# Patient Record
Sex: FEMALE | Race: WHITE | Employment: FULL TIME | URBAN - METROPOLITAN AREA
[De-identification: names, ages, dates, MRNs, and addresses within clinical notes are randomized per-mention and may not be internally consistent; named-entity substitution may affect disease eponyms.]

---

## 2017-01-10 ENCOUNTER — OFFICE VISIT (OUTPATIENT)
Dept: ORTHOPEDIC SURGERY | Age: 47
End: 2017-01-10

## 2017-01-10 VITALS
HEART RATE: 91 BPM | HEIGHT: 63 IN | DIASTOLIC BLOOD PRESSURE: 88 MMHG | OXYGEN SATURATION: 98 % | RESPIRATION RATE: 18 BRPM | SYSTOLIC BLOOD PRESSURE: 149 MMHG | BODY MASS INDEX: 35.01 KG/M2 | WEIGHT: 197.6 LBS | TEMPERATURE: 97.8 F

## 2017-01-10 DIAGNOSIS — M50.20 HNP (HERNIATED NUCLEUS PULPOSUS), CERVICAL: ICD-10-CM

## 2017-01-10 DIAGNOSIS — M48.02 CERVICAL SPINAL STENOSIS: Primary | ICD-10-CM

## 2017-01-10 NOTE — PROGRESS NOTES
[address], [address]  [place], [facility]  Phone: [phone]  Fax: [phone]  PROGRESS NOTE  Patient: Jimmy Drummond                MRN: [id number]       SSN: [id number]  YOB: 1970        AGE: 48 y.o. SEX: female  Body mass index is 35 kg/(m^2). PCP: Alexey Schulte MD  01/10/17    Chief Complaint   Patient presents with    Back Pain     3 month follow up       Michelle Half, RADIOGRAPHS, and PLAN:     HISTORY:  Ms. Digna Mensah returns today. She is complaining of a sense of progressive weakness in her right upper extremity. She is having continued neck pain. She rates her pain as a 4-5/10. She feels that she is dropping objects more often, and she is having trouble doing her activities at work and at home. With manual motor testing, I do not note much difference. PHYSICAL EXAM:  She has antalgic range of motion of her neck with shocking sensations into her right arm. RADIOGRAPHS:  MRIs demonstrate spondylotic changes with disc protrusion and foraminal stenosis due to disc and uncinate spurring, right greater than left, at L4-5 and L5-6. Generalized kyphotic attitude at these segments. ASSESSMENT/PLAN: I think would be better given the overall alignment of her spine   to consider a decompression and fusion as opposed to arthroplasty. I discussed the risks, benefits, complications, and alternatives to surgery. The patient wishes to proceed. She will abstain from smoking and proceed with surgery once the appropriate approvals and clearances take place. cc: Marie Vallejo M.D. Past Medical History   Diagnosis Date    Psychiatric disorder      depression       No family history on file. Current Outpatient Prescriptions   Medication Sig Dispense Refill    buPROPion SR (WELLBUTRIN SR) 150 mg SR tablet Take 150 mg by mouth two (2) times a day.       cyclobenzaprine (FLEXERIL) 10 mg tablet       HYDROcodone-acetaminophen (NORCO) 5-325 mg per tablet       melatonin tab tablet Take 10 mg by mouth nightly.  gabapentin (NEURONTIN) 600 mg tablet Take 1 Tab by mouth four (4) times daily. 120 Tab 1       No Known Allergies    Past Surgical History   Procedure Laterality Date    Hx gyn       hysterectomy    Hx gyn       c/s       Past Medical History   Diagnosis Date    Psychiatric disorder      depression       Social History     Social History    Marital status:      Spouse name: N/A    Number of children: N/A    Years of education: N/A     Occupational History    Not on file. Social History Main Topics    Smoking status: Current Every Day Smoker     Packs/day: 0.50    Smokeless tobacco: Not on file    Alcohol use Yes      Comment: socially    Drug use: Not on file    Sexual activity: Not on file     Other Topics Concern    Not on file     Social History Narrative     REVIEW OF SYSTEMS:   CONSTITUTIONAL SYMPTOMS:  Negative. EYES:  Negative. EARS, NOSE, THROAT AND MOUTH:  Negative. CARDIOVASCULAR:  Negative. RESPIRATORY:  Negative. GENITOURINARY: Negative. GASTROINTESTINAL:  Negative. INTEGUMENTARY (SKIN AND/OR BREAST):  Negative. MUSCULOSKELETAL: Per HPI.   ENDOCRINE/RHEUMATOLOGIC:  Negative. NEUROLOGICAL:  Per HPI. HEMATOLOGIC/LYMPHATIC:  Negative. ALLERGIC/IMMUNOLOGIC:  Negative. PSYCHIATRIC:  Negative. PHYSICAL EXAMINATION:   Visit Vitals    /88    Pulse 91    Temp 97.8 °F (36.6 °C) (Oral)    Resp 18    Ht 5' 3\" (1.6 m)    Wt 197 lb 9.6 oz (89.6 kg)    SpO2 98%    BMI 35 kg/m2        CONSTITUTIONAL: The patient is in no apparent distress and is alert and oriented x 3. HEENT: Normocephalic. Hearing grossly intact. NECK: Supple and symmetric. no tenderness, or masses were felt. RESPIRATORY: No labored breathing. CARDIOVASCULAR: The carotid pulses were normal. Peripheral pulses were 2+.   CHEST: Normal AP diameter and normal contour without any kyphoscoliosis. LYMPHATIC: No lymphadenopathy was appreciated in the neck, axillae or groin. SKIN: Negative for scars, rashes, lesions, or ulcers on the right upper, right lower, left upper, left lower and trunk. NEUROLOGICAL: Alert and oriented x 3. Ambulation without assistive device. FWB. EXTREMITIES: See musculoskeletal.  MUSCULOSKELETAL:   Head and Neck:  Negative for misalignment, asymmetry, crepitation, defects, tenderness masses or effusions.  Left Upper Extremity: Inspection, percussion and palpation preformed. Boyles sign is negative.  Right Upper Extremity: Paresthesia. Increasing weakness. Loss of dexterity. Inspection, percussion and palpation preformed. Boyles sign is negative.  Spine, Ribs and Pelvis: Inspection, percussion and palpation preformed. Negative for misalignment, asymmetry, crepitation, defects, tenderness masses or effusions.  Right Lower Extremity: Inspection, percussion and palpation preformed. Negative straight leg raise.  Left Lower Extremity: Inspection, percussion and palpation preformed. Negative straight leg raise. SPINE EXAM:     Cervical spine: Neck is midline. Normal muscle tone. No focal atrophy is noted. ASSESSMENT    ICD-10-CM ICD-9-CM    1. Cervical spinal stenosis M48.02 723.0    2.  HNP (herniated nucleus pulposus), cervical M50.20 722.0        Written by Jeanie You, as dictated by Monisha Francis MD.

## 2017-01-10 NOTE — PATIENT INSTRUCTIONS

## 2017-01-10 NOTE — PROGRESS NOTES
[address] [facility]   Pre-Surgical Worksheet    Patient: Mariaelena Isaac                         MRN: [id number]     Age:  48 y.o.,      Sex: female    YOB: 1970           LA: January 10, 2017  PCP: Richard Rand MD    No Known Allergies      ICD-10-CM ICD-9-CM    1. Cervical spinal stenosis M48.02 723.0    2. HNP (herniated nucleus pulposus), cervical M50.20 722.0        Surgery: ACDF C4/5 C5/6    Pain Assessment   Pain Assessment  1/10/2017   Location of Pain Back   Location Modifiers -   Severity of Pain 3   Quality of Pain Sharp;Dull;Aching   Duration of Pain Persistent   Frequency of Pain Constant   Aggravating Factors Bending;Standing;Walking;Stairs   Aggravating Factors Comment -   Limiting Behavior Yes   Relieving Factors Rest   Result of Injury No       Visit Vitals    /88    Pulse 91    Temp 97.8 °F (36.6 °C) (Oral)    Resp 18    Ht 5' 3\" (1.6 m)    Wt 197 lb 9.6 oz (89.6 kg)    SpO2 98%    BMI 35 kg/m2       ADL Limits: Bathing and Dressing    Spine Surgery?: No:  When . Where. Spinal Injections?: No:   When . Where. Physical Therapy?: Yes  When 2016. Where [facility]. NSAID's?: Yes    Pain Medications?: No:   Type: In Pain Management: YES, Where:   Pain level at worse is a 10/10      Current Outpatient Prescriptions   Medication Sig    buPROPion SR (WELLBUTRIN SR) 150 mg SR tablet Take 150 mg by mouth two (2) times a day.  cyclobenzaprine (FLEXERIL) 10 mg tablet     HYDROcodone-acetaminophen (NORCO) 5-325 mg per tablet     melatonin tab tablet Take 10 mg by mouth nightly.  gabapentin (NEURONTIN) 600 mg tablet Take 1 Tab by mouth four (4) times daily. No current facility-administered medications for this visit.         Past Medical History   Diagnosis Date    Psychiatric disorder      depression       Past Surgical History   Procedure Laterality Date    Hx gyn       hysterectomy    Hx gyn       c/s       Social History Social History    Marital status:      Spouse name: N/A    Number of children: N/A    Years of education: N/A     Social History Main Topics    Smoking status: Current Every Day Smoker     Packs/day: 0.50    Smokeless tobacco: None    Alcohol use Yes      Comment: socially    Drug use: None    Sexual activity: Not Asked     Other Topics Concern    None     Social History Narrative

## 2017-01-26 ENCOUNTER — DOCUMENTATION ONLY (OUTPATIENT)
Dept: ORTHOPEDIC SURGERY | Age: 47
End: 2017-01-26

## 2017-01-26 NOTE — PROGRESS NOTES
Disability form was received from [facility] , instructed patient that we will call when completed, may take 7-10 business days.

## 2017-01-31 ENCOUNTER — DOCUMENTATION ONLY (OUTPATIENT)
Dept: ORTHOPEDIC SURGERY | Age: 47
End: 2017-01-31

## 2017-02-17 ENCOUNTER — HOSPITAL ENCOUNTER (OUTPATIENT)
Dept: PREADMISSION TESTING | Age: 47
Discharge: HOME | End: 2017-02-17
Payer: COMMERCIAL

## 2017-02-17 DIAGNOSIS — Z01.818 PRE-OP EVALUATION: ICD-10-CM

## 2017-02-17 LAB
ALBUMIN SERPL BCP-MCNC: 4 G/DL (ref 3.4–5)
ALBUMIN/GLOB SERPL: 1.3 {RATIO} (ref 0.8–1.7)
ALP SERPL-CCNC: 89 U/L (ref 45–117)
ALT SERPL-CCNC: 21 U/L (ref 13–56)
ANION GAP BLD CALC-SCNC: 6 MMOL/L (ref 3–18)
AST SERPL W P-5'-P-CCNC: 13 U/L (ref 15–37)
BILIRUB SERPL-MCNC: 0.3 MG/DL (ref 0.2–1)
BUN SERPL-MCNC: 10 MG/DL (ref 7–18)
BUN/CREAT SERPL: 12 (ref 12–20)
CALCIUM SERPL-MCNC: 9.3 MG/DL (ref 8.5–10.1)
CHLORIDE SERPL-SCNC: 99 MMOL/L (ref 100–108)
CO2 SERPL-SCNC: 31 MMOL/L (ref 21–32)
CREAT SERPL-MCNC: 0.84 MG/DL (ref 0.6–1.3)
ERYTHROCYTE [DISTWIDTH] IN BLOOD BY AUTOMATED COUNT: 12.8 % (ref 11.6–14.5)
GLOBULIN SER CALC-MCNC: 3.2 G/DL (ref 2–4)
GLUCOSE SERPL-MCNC: 89 MG/DL (ref 74–99)
HCT VFR BLD AUTO: 44.4 % (ref 35–45)
HGB BLD-MCNC: 14.7 G/DL (ref 12–16)
MCH RBC QN AUTO: 30.1 PG (ref 24–34)
MCHC RBC AUTO-ENTMCNC: 33.1 G/DL (ref 31–37)
MCV RBC AUTO: 91 FL (ref 74–97)
PLATELET # BLD AUTO: 305 K/UL (ref 135–420)
PMV BLD AUTO: 10.8 FL (ref 9.2–11.8)
POTASSIUM SERPL-SCNC: 4.3 MMOL/L (ref 3.5–5.5)
PROT SERPL-MCNC: 7.2 G/DL (ref 6.4–8.2)
RBC # BLD AUTO: 4.88 M/UL (ref 4.2–5.3)
SODIUM SERPL-SCNC: 136 MMOL/L (ref 136–145)
WBC # BLD AUTO: 9.6 K/UL (ref 4.6–13.2)

## 2017-02-17 PROCEDURE — 36415 COLL VENOUS BLD VENIPUNCTURE: CPT | Performed by: ORTHOPAEDIC SURGERY

## 2017-02-17 PROCEDURE — 93005 ELECTROCARDIOGRAM TRACING: CPT

## 2017-02-17 PROCEDURE — 85027 COMPLETE CBC AUTOMATED: CPT | Performed by: ORTHOPAEDIC SURGERY

## 2017-02-17 PROCEDURE — 80053 COMPREHEN METABOLIC PANEL: CPT | Performed by: ORTHOPAEDIC SURGERY

## 2017-02-17 RX ORDER — FEXOFENADINE HCL AND PSEUDOEPHEDRINE HCI 60; 120 MG/1; MG/1
1 TABLET, EXTENDED RELEASE ORAL EVERY 12 HOURS
COMMUNITY

## 2017-02-18 LAB
ATRIAL RATE: 66 BPM
CALCULATED P AXIS, ECG09: 34 DEGREES
CALCULATED R AXIS, ECG10: 25 DEGREES
CALCULATED T AXIS, ECG11: 10 DEGREES
DIAGNOSIS, 93000: NORMAL
P-R INTERVAL, ECG05: 160 MS
Q-T INTERVAL, ECG07: 432 MS
QRS DURATION, ECG06: 80 MS
QTC CALCULATION (BEZET), ECG08: 452 MS
VENTRICULAR RATE, ECG03: 66 BPM

## 2017-02-20 NOTE — H&P
Pre-Admission History and Physical    Patient: Katalina Conti   MRN: [id number]   SSN: [id number]   YOB: 1970   Age: 48 y.o. Sex: female     Patient scheduled for: ACDF C4/5, 5/6.6/7  Date of surgery: 2/27/17. [facility] Surgeon: Jim Bullock MD    HPI:  Katalina Conti is a 48 y.o. female with a sense of progressive weakness in her right upper extremity. She is having continued neck pain. She rates her pain as a 4-5/10. She feels that she is dropping objects more often, and she is having trouble doing her activities at work and at home. MRI demonstrates spondylotic changes with disc protrusion and foraminal stenosis due to disc and uncinate spurring, right greater than left, at C4-5 andC5-6, C6/7. Generalized kyphotic attitude at these segments. This patient has failed the presurgical conservative treatments  including physical therapy and medications. . Pain has impacted the patient's functional ability to bathe and dress without pain. She  is being admitted for surgical intervention. Past Medical History   Diagnosis Date    Psychiatric disorder      depression     Social History     Social History    Marital status:      Spouse name: N/A    Number of children: N/A    Years of education: N/A     Social History Main Topics    Smoking status: Current Every Day Smoker     Packs/day: 0.50    Smokeless tobacco: None    Alcohol use Yes      Comment: socially    Drug use: None    Sexual activity: Not Asked     Other Topics Concern    None     Social History Narrative     Past Surgical History   Procedure Laterality Date    Hx gyn       hysterectomy    Hx gyn       c/s     History reviewed. No pertinent family history. No Known Allergies  Current Outpatient Prescriptions   Medication Sig Dispense Refill    fexofenadine-pseudoephedrine (ALLEGRA-D 12 HOUR)  mg Tb12 Take 1 Tab by mouth every twelve (12) hours.       cyclobenzaprine (FLEXERIL) 10 mg tablet       HYDROcodone-acetaminophen (NORCO) 5-325 mg per tablet       melatonin tab tablet Take 10 mg by mouth nightly.  gabapentin (NEURONTIN) 600 mg tablet Take 1 Tab by mouth four (4) times daily. 120 Tab 1    buPROPion SR (WELLBUTRIN SR) 150 mg SR tablet Take 150 mg by mouth two (2) times a day. ROS:  Denies chills, fever,night sweats,  bowel or bladder dysfunction, unexplained weight loss/weight gain, chest pain, sob or anxiety. Physical Examination    Gen: Well developed, well nourished 48 y.o. female    /88    Pulse 91    Temp 97.8 °F (36.6 °C) (Oral)    Resp 18    Ht 5' 3\" (1.6 m)    Wt 197 lb 9.6 oz (89.6 kg)    SpO2 98%    BMI 35 kg/m2         CONSTITUTIONAL: The patient is in no apparent distress and is alert and oriented x 3. HEENT: Normocephalic. Hearing grossly intact. NECK: Supple and symmetric. no tenderness, or masses were felt. RESPIRATORY: No labored breathing. CARDIOVASCULAR: The carotid pulses were normal. Peripheral pulses were 2+. CHEST: Normal AP diameter and normal contour without any kyphoscoliosis. LYMPHATIC: No lymphadenopathy was appreciated in the neck, axillae or groin. SKIN: Negative for scars, rashes, lesions, or ulcers on the right upper, right lower, left upper, left lower and trunk. NEUROLOGICAL: Alert and oriented x 3. Ambulation without assistive device. FWB. EXTREMITIES: See musculoskeletal.  MUSCULOSKELETAL:  · Head and Neck: Negative for misalignment, asymmetry, crepitation, defects, tenderness masses or effusions. · Left Upper Extremity: Inspection, percussion and palpation preformed. Boyles sign is negative. · Right Upper Extremity: Paresthesia. Increasing weakness. Loss of dexterity. Inspection, percussion and palpation preformed. Boyles sign is negative. · Spine, Ribs and Pelvis: Inspection, percussion and palpation preformed.  Negative for misalignment, asymmetry, crepitation, defects, tenderness masses or effusions. · Right Lower Extremity: Inspection, percussion and palpation preformed. Negative straight leg raise. · Left Lower Extremity: Inspection, percussion and palpation preformed. Negative straight leg raise.        SPINE EXAM:      Cervical spine: Neck is midline. Normal muscle tone. No focal atrophy is noted.           Assessment and Plan    Due to the pt's persistent symptoms unrelieved by conservative measure [facility]elida Ash [facility]. The post-operative plan of care consists of physical therapy, home health and a 2 week f/u office visit. We are pending medical clearance by Dr Evelia Bella. The risks, benefits, complications and alternatives to surgery have been discussed in detail with the patient. The patient understands and agrees to proceed.      NIKA Jovel dictating for Carie Huang MD

## 2017-02-24 ENCOUNTER — ANESTHESIA EVENT (OUTPATIENT)
Dept: SURGERY | Age: 47
End: 2017-02-24
Payer: COMMERCIAL

## 2017-02-27 ENCOUNTER — APPOINTMENT (OUTPATIENT)
Dept: GENERAL RADIOLOGY | Age: 47
End: 2017-02-27
Attending: ORTHOPAEDIC SURGERY
Payer: COMMERCIAL

## 2017-02-27 ENCOUNTER — HOSPITAL ENCOUNTER (OUTPATIENT)
Age: 47
Setting detail: OBSERVATION
Discharge: HOME HEALTH | End: 2017-02-28
Attending: ORTHOPAEDIC SURGERY | Admitting: ORTHOPAEDIC SURGERY
Payer: COMMERCIAL

## 2017-02-27 ENCOUNTER — SURGERY (OUTPATIENT)
Age: 47
End: 2017-02-27

## 2017-02-27 ENCOUNTER — ANESTHESIA (OUTPATIENT)
Dept: SURGERY | Age: 47
End: 2017-02-27
Payer: COMMERCIAL

## 2017-02-27 PROCEDURE — 77030031139 HC SUT VCRL2 J&J -A: Performed by: ORTHOPAEDIC SURGERY

## 2017-02-27 PROCEDURE — 74011000250 HC RX REV CODE- 250

## 2017-02-27 PROCEDURE — 77030010514 HC APPL CLP LIG COVD -B: Performed by: ORTHOPAEDIC SURGERY

## 2017-02-27 PROCEDURE — 77030029099 HC BN WAX SSPC -A: Performed by: ORTHOPAEDIC SURGERY

## 2017-02-27 PROCEDURE — 77030003909 HC BIT DRL W/STP SYNT -D: Performed by: ORTHOPAEDIC SURGERY

## 2017-02-27 PROCEDURE — 76060000036 HC ANESTHESIA 2.5 TO 3 HR: Performed by: ORTHOPAEDIC SURGERY

## 2017-02-27 PROCEDURE — 77030012406 HC DRN WND PENRS BARD -A: Performed by: ORTHOPAEDIC SURGERY

## 2017-02-27 PROCEDURE — C1713 ANCHOR/SCREW BN/BN,TIS/BN: HCPCS | Performed by: ORTHOPAEDIC SURGERY

## 2017-02-27 PROCEDURE — 77030010507 HC ADH SKN DERMBND J&J -B: Performed by: ORTHOPAEDIC SURGERY

## 2017-02-27 PROCEDURE — 74011250636 HC RX REV CODE- 250/636: Performed by: NURSE ANESTHETIST, CERTIFIED REGISTERED

## 2017-02-27 PROCEDURE — 99218 HC RM OBSERVATION: CPT

## 2017-02-27 PROCEDURE — 77030013079 HC BLNKT BAIR HGGR 3M -A: Performed by: ANESTHESIOLOGY

## 2017-02-27 PROCEDURE — 74011250636 HC RX REV CODE- 250/636

## 2017-02-27 PROCEDURE — 77030002933 HC SUT MCRYL J&J -A: Performed by: ORTHOPAEDIC SURGERY

## 2017-02-27 PROCEDURE — 77030008683 HC TU ET CUF COVD -A: Performed by: ANESTHESIOLOGY

## 2017-02-27 PROCEDURE — 76210000016 HC OR PH I REC 1 TO 1.5 HR: Performed by: ORTHOPAEDIC SURGERY

## 2017-02-27 PROCEDURE — 74011250637 HC RX REV CODE- 250/637: Performed by: NURSE ANESTHETIST, CERTIFIED REGISTERED

## 2017-02-27 PROCEDURE — 77030032490 HC SLV COMPR SCD KNE COVD -B: Performed by: ORTHOPAEDIC SURGERY

## 2017-02-27 PROCEDURE — 77030020782 HC GWN BAIR PAWS FLX 3M -B: Performed by: ORTHOPAEDIC SURGERY

## 2017-02-27 PROCEDURE — 74011250636 HC RX REV CODE- 250/636: Performed by: NURSE PRACTITIONER

## 2017-02-27 PROCEDURE — 77030011267 HC ELECTRD BLD COVD -A: Performed by: ORTHOPAEDIC SURGERY

## 2017-02-27 PROCEDURE — 77030026438 HC STYL ET INTUB CARD -A: Performed by: ANESTHESIOLOGY

## 2017-02-27 PROCEDURE — 77030013567 HC DRN WND RESERV BARD -A: Performed by: ORTHOPAEDIC SURGERY

## 2017-02-27 PROCEDURE — 77030034865: Performed by: ORTHOPAEDIC SURGERY

## 2017-02-27 PROCEDURE — 74011250636 HC RX REV CODE- 250/636: Performed by: ANESTHESIOLOGY

## 2017-02-27 PROCEDURE — 77030004402 HC BUR NEUR STRY -C: Performed by: ORTHOPAEDIC SURGERY

## 2017-02-27 PROCEDURE — 77030018836 HC SOL IRR NACL ICUM -A: Performed by: ORTHOPAEDIC SURGERY

## 2017-02-27 PROCEDURE — 77030011256 HC DRSG MEPILEX <16IN NO BORD MOLN -A: Performed by: ORTHOPAEDIC SURGERY

## 2017-02-27 PROCEDURE — 74011250637 HC RX REV CODE- 250/637: Performed by: ORTHOPAEDIC SURGERY

## 2017-02-27 PROCEDURE — 76010000132 HC OR TIME 2.5 TO 3 HR: Performed by: ORTHOPAEDIC SURGERY

## 2017-02-27 PROCEDURE — 74011000272 HC RX REV CODE- 272: Performed by: ORTHOPAEDIC SURGERY

## 2017-02-27 PROCEDURE — 74011000250 HC RX REV CODE- 250: Performed by: ORTHOPAEDIC SURGERY

## 2017-02-27 PROCEDURE — 77030034863 HC SCR SPN ARCHON NUVA -C: Performed by: ORTHOPAEDIC SURGERY

## 2017-02-27 PROCEDURE — 74011250636 HC RX REV CODE- 250/636: Performed by: ORTHOPAEDIC SURGERY

## 2017-02-27 PROCEDURE — L0120 CERV FLEX N/ADJ FOAM PRE OTS: HCPCS | Performed by: ORTHOPAEDIC SURGERY

## 2017-02-27 PROCEDURE — 77030037102 HC SPCR CERV ALLGRFT TRIAD NUVA -G1: Performed by: ORTHOPAEDIC SURGERY

## 2017-02-27 PROCEDURE — 77030011640 HC PAD GRND REM COVD -A: Performed by: ORTHOPAEDIC SURGERY

## 2017-02-27 PROCEDURE — 97161 PT EVAL LOW COMPLEX 20 MIN: CPT

## 2017-02-27 PROCEDURE — 77030019908 HC STETH ESOPH SIMS -A: Performed by: ANESTHESIOLOGY

## 2017-02-27 PROCEDURE — 77030012894: Performed by: ORTHOPAEDIC SURGERY

## 2017-02-27 DEVICE — IMPLANTABLE DEVICE: Type: IMPLANTABLE DEVICE | Site: SPINE CERVICAL | Status: FUNCTIONAL

## 2017-02-27 DEVICE — SCREW SPNL L15MM DIA4MM ANTR CERV SELF DRL VAR ANG ARCHON: Type: IMPLANTABLE DEVICE | Site: SPINE CERVICAL | Status: FUNCTIONAL

## 2017-02-27 DEVICE — CAGE SPNL W11XH8XL14MM LUM LORD INTBDY FUS TRIAD CC: Type: IMPLANTABLE DEVICE | Site: SPINE CERVICAL | Status: FUNCTIONAL

## 2017-02-27 DEVICE — SSC BONE WAX
Type: IMPLANTABLE DEVICE | Site: SPINE CERVICAL | Status: FUNCTIONAL
Brand: SSC BONE WAX

## 2017-02-27 RX ORDER — SODIUM CHLORIDE 0.9 % (FLUSH) 0.9 %
5-10 SYRINGE (ML) INJECTION EVERY 8 HOURS
Status: DISCONTINUED | OUTPATIENT
Start: 2017-02-27 | End: 2017-02-28 | Stop reason: HOSPADM

## 2017-02-27 RX ORDER — SODIUM CHLORIDE 0.9 % (FLUSH) 0.9 %
5-10 SYRINGE (ML) INJECTION AS NEEDED
Status: DISCONTINUED | OUTPATIENT
Start: 2017-02-27 | End: 2017-02-27 | Stop reason: HOSPADM

## 2017-02-27 RX ORDER — DIPHENHYDRAMINE HYDROCHLORIDE 50 MG/ML
12.5 INJECTION, SOLUTION INTRAMUSCULAR; INTRAVENOUS
Status: DISCONTINUED | OUTPATIENT
Start: 2017-02-27 | End: 2017-02-28 | Stop reason: HOSPADM

## 2017-02-27 RX ORDER — ALBUTEROL SULFATE 0.83 MG/ML
2.5 SOLUTION RESPIRATORY (INHALATION) AS NEEDED
Status: DISCONTINUED | OUTPATIENT
Start: 2017-02-27 | End: 2017-02-27 | Stop reason: HOSPADM

## 2017-02-27 RX ORDER — HYDROMORPHONE HYDROCHLORIDE 1 MG/ML
1 INJECTION, SOLUTION INTRAMUSCULAR; INTRAVENOUS; SUBCUTANEOUS
Status: DISCONTINUED | OUTPATIENT
Start: 2017-02-27 | End: 2017-02-28 | Stop reason: HOSPADM

## 2017-02-27 RX ORDER — NALOXONE HYDROCHLORIDE 0.4 MG/ML
0.4 INJECTION, SOLUTION INTRAMUSCULAR; INTRAVENOUS; SUBCUTANEOUS AS NEEDED
Status: DISCONTINUED | OUTPATIENT
Start: 2017-02-27 | End: 2017-02-27 | Stop reason: HOSPADM

## 2017-02-27 RX ORDER — FAMOTIDINE 20 MG/50ML
20 INJECTION, SOLUTION INTRAVENOUS EVERY 12 HOURS
Status: DISCONTINUED | OUTPATIENT
Start: 2017-02-27 | End: 2017-02-27 | Stop reason: SDUPTHER

## 2017-02-27 RX ORDER — LORAZEPAM 2 MG/ML
1 INJECTION INTRAMUSCULAR
Status: DISCONTINUED | OUTPATIENT
Start: 2017-02-27 | End: 2017-02-28 | Stop reason: HOSPADM

## 2017-02-27 RX ORDER — NALOXONE HYDROCHLORIDE 0.4 MG/ML
0.4 INJECTION, SOLUTION INTRAMUSCULAR; INTRAVENOUS; SUBCUTANEOUS AS NEEDED
Status: DISCONTINUED | OUTPATIENT
Start: 2017-02-27 | End: 2017-02-28 | Stop reason: HOSPADM

## 2017-02-27 RX ORDER — CEFAZOLIN SODIUM 2 G/50ML
2 SOLUTION INTRAVENOUS EVERY 8 HOURS
Status: DISCONTINUED | OUTPATIENT
Start: 2017-02-27 | End: 2017-02-28 | Stop reason: HOSPADM

## 2017-02-27 RX ORDER — VARENICLINE TARTRATE 1 MG/1
1 TABLET, FILM COATED ORAL
Status: DISCONTINUED | OUTPATIENT
Start: 2017-02-27 | End: 2017-02-28 | Stop reason: HOSPADM

## 2017-02-27 RX ORDER — DIAZEPAM 5 MG/1
5 TABLET ORAL
Status: DISCONTINUED | OUTPATIENT
Start: 2017-02-27 | End: 2017-02-28 | Stop reason: HOSPADM

## 2017-02-27 RX ORDER — FENTANYL CITRATE 50 UG/ML
INJECTION, SOLUTION INTRAMUSCULAR; INTRAVENOUS AS NEEDED
Status: DISCONTINUED | OUTPATIENT
Start: 2017-02-27 | End: 2017-02-27 | Stop reason: HOSPADM

## 2017-02-27 RX ORDER — SODIUM CHLORIDE, SODIUM LACTATE, POTASSIUM CHLORIDE, CALCIUM CHLORIDE 600; 310; 30; 20 MG/100ML; MG/100ML; MG/100ML; MG/100ML
50 INJECTION, SOLUTION INTRAVENOUS CONTINUOUS
Status: DISCONTINUED | OUTPATIENT
Start: 2017-02-27 | End: 2017-02-27 | Stop reason: HOSPADM

## 2017-02-27 RX ORDER — SODIUM CHLORIDE 9 MG/ML
INJECTION, SOLUTION INTRAVENOUS
Status: DISCONTINUED | OUTPATIENT
Start: 2017-02-27 | End: 2017-02-27 | Stop reason: HOSPADM

## 2017-02-27 RX ORDER — PROPOFOL 10 MG/ML
INJECTION, EMULSION INTRAVENOUS AS NEEDED
Status: DISCONTINUED | OUTPATIENT
Start: 2017-02-27 | End: 2017-02-27 | Stop reason: HOSPADM

## 2017-02-27 RX ORDER — ONDANSETRON 2 MG/ML
4 INJECTION INTRAMUSCULAR; INTRAVENOUS ONCE
Status: COMPLETED | OUTPATIENT
Start: 2017-02-27 | End: 2017-02-27

## 2017-02-27 RX ORDER — DEXAMETHASONE SODIUM PHOSPHATE 4 MG/ML
INJECTION, SOLUTION INTRA-ARTICULAR; INTRALESIONAL; INTRAMUSCULAR; INTRAVENOUS; SOFT TISSUE AS NEEDED
Status: DISCONTINUED | OUTPATIENT
Start: 2017-02-27 | End: 2017-02-27 | Stop reason: HOSPADM

## 2017-02-27 RX ORDER — SUCCINYLCHOLINE CHLORIDE 20 MG/ML
INJECTION INTRAMUSCULAR; INTRAVENOUS AS NEEDED
Status: DISCONTINUED | OUTPATIENT
Start: 2017-02-27 | End: 2017-02-27 | Stop reason: HOSPADM

## 2017-02-27 RX ORDER — SODIUM CHLORIDE 0.9 % (FLUSH) 0.9 %
5-10 SYRINGE (ML) INJECTION EVERY 8 HOURS
Status: DISCONTINUED | OUTPATIENT
Start: 2017-02-27 | End: 2017-02-27 | Stop reason: HOSPADM

## 2017-02-27 RX ORDER — DEXTROSE, SODIUM CHLORIDE, AND POTASSIUM CHLORIDE 5; .45; .15 G/100ML; G/100ML; G/100ML
25 INJECTION INTRAVENOUS CONTINUOUS
Status: DISCONTINUED | OUTPATIENT
Start: 2017-02-27 | End: 2017-02-28 | Stop reason: HOSPADM

## 2017-02-27 RX ORDER — VARENICLINE TARTRATE 1 MG/1
1 TABLET, FILM COATED ORAL
COMMUNITY
End: 2018-04-23

## 2017-02-27 RX ORDER — VANCOMYCIN HYDROCHLORIDE 1 G/20ML
INJECTION, POWDER, LYOPHILIZED, FOR SOLUTION INTRAVENOUS AS NEEDED
Status: DISCONTINUED | OUTPATIENT
Start: 2017-02-27 | End: 2017-02-27 | Stop reason: HOSPADM

## 2017-02-27 RX ORDER — ONDANSETRON 2 MG/ML
4 INJECTION INTRAMUSCULAR; INTRAVENOUS
Status: DISCONTINUED | OUTPATIENT
Start: 2017-02-27 | End: 2017-02-28 | Stop reason: HOSPADM

## 2017-02-27 RX ORDER — LIDOCAINE HYDROCHLORIDE 20 MG/ML
INJECTION, SOLUTION EPIDURAL; INFILTRATION; INTRACAUDAL; PERINEURAL AS NEEDED
Status: DISCONTINUED | OUTPATIENT
Start: 2017-02-27 | End: 2017-02-27 | Stop reason: HOSPADM

## 2017-02-27 RX ORDER — SODIUM CHLORIDE, SODIUM LACTATE, POTASSIUM CHLORIDE, CALCIUM CHLORIDE 600; 310; 30; 20 MG/100ML; MG/100ML; MG/100ML; MG/100ML
25 INJECTION, SOLUTION INTRAVENOUS CONTINUOUS
Status: DISCONTINUED | OUTPATIENT
Start: 2017-02-27 | End: 2017-02-27 | Stop reason: HOSPADM

## 2017-02-27 RX ORDER — ROCURONIUM BROMIDE 10 MG/ML
INJECTION, SOLUTION INTRAVENOUS AS NEEDED
Status: DISCONTINUED | OUTPATIENT
Start: 2017-02-27 | End: 2017-02-27 | Stop reason: HOSPADM

## 2017-02-27 RX ORDER — LABETALOL HYDROCHLORIDE 5 MG/ML
INJECTION, SOLUTION INTRAVENOUS AS NEEDED
Status: DISCONTINUED | OUTPATIENT
Start: 2017-02-27 | End: 2017-02-27 | Stop reason: HOSPADM

## 2017-02-27 RX ORDER — FAMOTIDINE 10 MG/ML
20 INJECTION INTRAVENOUS EVERY 12 HOURS
Status: DISCONTINUED | OUTPATIENT
Start: 2017-02-27 | End: 2017-02-28 | Stop reason: HOSPADM

## 2017-02-27 RX ORDER — OXYCODONE AND ACETAMINOPHEN 10; 325 MG/1; MG/1
1 TABLET ORAL
Status: DISCONTINUED | OUTPATIENT
Start: 2017-02-27 | End: 2017-02-28 | Stop reason: HOSPADM

## 2017-02-27 RX ORDER — ONDANSETRON 2 MG/ML
INJECTION INTRAMUSCULAR; INTRAVENOUS AS NEEDED
Status: DISCONTINUED | OUTPATIENT
Start: 2017-02-27 | End: 2017-02-27 | Stop reason: HOSPADM

## 2017-02-27 RX ORDER — DIPHENHYDRAMINE HCL 25 MG
25 CAPSULE ORAL
Status: DISCONTINUED | OUTPATIENT
Start: 2017-02-27 | End: 2017-02-28 | Stop reason: HOSPADM

## 2017-02-27 RX ORDER — DEXAMETHASONE SODIUM PHOSPHATE 4 MG/ML
10 INJECTION, SOLUTION INTRA-ARTICULAR; INTRALESIONAL; INTRAMUSCULAR; INTRAVENOUS; SOFT TISSUE ONCE
Status: COMPLETED | OUTPATIENT
Start: 2017-02-27 | End: 2017-02-27

## 2017-02-27 RX ORDER — DIPHENHYDRAMINE HYDROCHLORIDE 50 MG/ML
12.5 INJECTION, SOLUTION INTRAMUSCULAR; INTRAVENOUS
Status: DISCONTINUED | OUTPATIENT
Start: 2017-02-27 | End: 2017-02-27 | Stop reason: HOSPADM

## 2017-02-27 RX ORDER — CELECOXIB 400 MG/1
400 CAPSULE ORAL
Status: COMPLETED | OUTPATIENT
Start: 2017-02-27 | End: 2017-02-27

## 2017-02-27 RX ORDER — CEFAZOLIN SODIUM 2 G/50ML
2 SOLUTION INTRAVENOUS ONCE
Status: COMPLETED | OUTPATIENT
Start: 2017-02-27 | End: 2017-02-27

## 2017-02-27 RX ORDER — GLYCOPYRROLATE 0.2 MG/ML
INJECTION INTRAMUSCULAR; INTRAVENOUS AS NEEDED
Status: DISCONTINUED | OUTPATIENT
Start: 2017-02-27 | End: 2017-02-27 | Stop reason: HOSPADM

## 2017-02-27 RX ORDER — SODIUM CHLORIDE 0.9 % (FLUSH) 0.9 %
5-10 SYRINGE (ML) INJECTION AS NEEDED
Status: DISCONTINUED | OUTPATIENT
Start: 2017-02-27 | End: 2017-02-28 | Stop reason: HOSPADM

## 2017-02-27 RX ORDER — NEOSTIGMINE METHYLSULFATE 1 MG/ML
INJECTION INTRAVENOUS AS NEEDED
Status: DISCONTINUED | OUTPATIENT
Start: 2017-02-27 | End: 2017-02-27 | Stop reason: HOSPADM

## 2017-02-27 RX ORDER — HYDROMORPHONE HYDROCHLORIDE 2 MG/ML
INJECTION, SOLUTION INTRAMUSCULAR; INTRAVENOUS; SUBCUTANEOUS
Status: COMPLETED
Start: 2017-02-27 | End: 2017-02-27

## 2017-02-27 RX ORDER — MIDAZOLAM HYDROCHLORIDE 1 MG/ML
INJECTION, SOLUTION INTRAMUSCULAR; INTRAVENOUS AS NEEDED
Status: DISCONTINUED | OUTPATIENT
Start: 2017-02-27 | End: 2017-02-27 | Stop reason: HOSPADM

## 2017-02-27 RX ORDER — GABAPENTIN 300 MG/1
600 CAPSULE ORAL 3 TIMES DAILY
Status: DISCONTINUED | OUTPATIENT
Start: 2017-02-27 | End: 2017-02-28 | Stop reason: HOSPADM

## 2017-02-27 RX ORDER — HYDROMORPHONE HYDROCHLORIDE 1 MG/ML
INJECTION, SOLUTION INTRAMUSCULAR; INTRAVENOUS; SUBCUTANEOUS AS NEEDED
Status: DISCONTINUED | OUTPATIENT
Start: 2017-02-27 | End: 2017-02-27 | Stop reason: HOSPADM

## 2017-02-27 RX ORDER — FAMOTIDINE 20 MG/1
20 TABLET, FILM COATED ORAL ONCE
Status: COMPLETED | OUTPATIENT
Start: 2017-02-27 | End: 2017-02-27

## 2017-02-27 RX ORDER — BUPROPION HYDROCHLORIDE 150 MG/1
150 TABLET, EXTENDED RELEASE ORAL 2 TIMES DAILY
Status: DISCONTINUED | OUTPATIENT
Start: 2017-02-27 | End: 2017-02-28 | Stop reason: HOSPADM

## 2017-02-27 RX ORDER — PREGABALIN 50 MG/1
50 CAPSULE ORAL
Status: DISCONTINUED | OUTPATIENT
Start: 2017-02-27 | End: 2017-02-27 | Stop reason: HOSPADM

## 2017-02-27 RX ORDER — HYDROMORPHONE HYDROCHLORIDE 2 MG/ML
0.5 INJECTION, SOLUTION INTRAMUSCULAR; INTRAVENOUS; SUBCUTANEOUS
Status: DISCONTINUED | OUTPATIENT
Start: 2017-02-27 | End: 2017-02-28 | Stop reason: HOSPADM

## 2017-02-27 RX ADMIN — ROCURONIUM BROMIDE 5 MG: 10 INJECTION, SOLUTION INTRAVENOUS at 11:23

## 2017-02-27 RX ADMIN — DEXAMETHASONE SODIUM PHOSPHATE 10 MG: 4 INJECTION, SOLUTION INTRAMUSCULAR; INTRAVENOUS at 22:54

## 2017-02-27 RX ADMIN — SODIUM CHLORIDE, SODIUM LACTATE, POTASSIUM CHLORIDE, AND CALCIUM CHLORIDE: 600; 310; 30; 20 INJECTION, SOLUTION INTRAVENOUS at 11:16

## 2017-02-27 RX ADMIN — PROPOFOL 50 MG: 10 INJECTION, EMULSION INTRAVENOUS at 12:38

## 2017-02-27 RX ADMIN — HYDROMORPHONE HYDROCHLORIDE 1 MG: 1 INJECTION, SOLUTION INTRAMUSCULAR; INTRAVENOUS; SUBCUTANEOUS at 11:43

## 2017-02-27 RX ADMIN — VARENICLINE TARTRATE 1 MG: 1 TABLET, FILM COATED ORAL at 18:56

## 2017-02-27 RX ADMIN — GELATIN ABSORBABLE SPONGE SIZE 100 1 EACH: MISC at 12:04

## 2017-02-27 RX ADMIN — HYDROMORPHONE HYDROCHLORIDE 1 MG: 1 INJECTION, SOLUTION INTRAMUSCULAR; INTRAVENOUS; SUBCUTANEOUS at 17:02

## 2017-02-27 RX ADMIN — GABAPENTIN 600 MG: 300 CAPSULE ORAL at 22:06

## 2017-02-27 RX ADMIN — MIDAZOLAM HYDROCHLORIDE 2 MG: 1 INJECTION, SOLUTION INTRAMUSCULAR; INTRAVENOUS at 11:16

## 2017-02-27 RX ADMIN — GLYCOPYRROLATE 0.6 MG: 0.2 INJECTION INTRAMUSCULAR; INTRAVENOUS at 13:35

## 2017-02-27 RX ADMIN — VANCOMYCIN HYDROCHLORIDE 1 G: 1 INJECTION, POWDER, LYOPHILIZED, FOR SOLUTION INTRAVENOUS at 12:04

## 2017-02-27 RX ADMIN — PREGABALIN 50 MG: 50 CAPSULE ORAL at 09:32

## 2017-02-27 RX ADMIN — FAMOTIDINE 20 MG: 10 INJECTION, SOLUTION INTRAVENOUS at 20:41

## 2017-02-27 RX ADMIN — THROMBIN, TOPICAL (RECOMBINANT) 5000 UNITS: KIT at 11:50

## 2017-02-27 RX ADMIN — SODIUM CHLORIDE: 9 INJECTION, SOLUTION INTRAVENOUS at 11:28

## 2017-02-27 RX ADMIN — CELECOXIB 400 MG: 400 CAPSULE ORAL at 09:34

## 2017-02-27 RX ADMIN — ROCURONIUM BROMIDE 25 MG: 10 INJECTION, SOLUTION INTRAVENOUS at 11:29

## 2017-02-27 RX ADMIN — FENTANYL CITRATE 50 MCG: 50 INJECTION, SOLUTION INTRAMUSCULAR; INTRAVENOUS at 13:15

## 2017-02-27 RX ADMIN — ONDANSETRON HYDROCHLORIDE 4 MG: 2 SOLUTION INTRAMUSCULAR; INTRAVENOUS at 16:57

## 2017-02-27 RX ADMIN — HYDROMORPHONE HYDROCHLORIDE 0.5 MG: 2 INJECTION, SOLUTION INTRAMUSCULAR; INTRAVENOUS; SUBCUTANEOUS at 14:28

## 2017-02-27 RX ADMIN — ROCURONIUM BROMIDE 10 MG: 10 INJECTION, SOLUTION INTRAVENOUS at 12:38

## 2017-02-27 RX ADMIN — DIAZEPAM 5 MG: 5 TABLET ORAL at 17:02

## 2017-02-27 RX ADMIN — FAMOTIDINE 20 MG: 20 TABLET ORAL at 09:32

## 2017-02-27 RX ADMIN — SODIUM CHLORIDE 300 ML: 900 IRRIGANT IRRIGATION at 12:27

## 2017-02-27 RX ADMIN — DEXAMETHASONE SODIUM PHOSPHATE 10 MG: 4 INJECTION, SOLUTION INTRA-ARTICULAR; INTRALESIONAL; INTRAMUSCULAR; INTRAVENOUS; SOFT TISSUE at 11:23

## 2017-02-27 RX ADMIN — CEFAZOLIN SODIUM 2 G: 2 SOLUTION INTRAVENOUS at 18:54

## 2017-02-27 RX ADMIN — FENTANYL CITRATE 50 MCG: 50 INJECTION, SOLUTION INTRAMUSCULAR; INTRAVENOUS at 13:57

## 2017-02-27 RX ADMIN — HYDROMORPHONE HYDROCHLORIDE 1 MG: 1 INJECTION, SOLUTION INTRAMUSCULAR; INTRAVENOUS; SUBCUTANEOUS at 14:01

## 2017-02-27 RX ADMIN — ONDANSETRON 4 MG: 2 INJECTION INTRAMUSCULAR; INTRAVENOUS at 11:16

## 2017-02-27 RX ADMIN — DEXTROSE MONOHYDRATE, SODIUM CHLORIDE, AND POTASSIUM CHLORIDE 25 ML/HR: 50; 4.5; 1.49 INJECTION, SOLUTION INTRAVENOUS at 17:17

## 2017-02-27 RX ADMIN — THROMBIN, TOPICAL (RECOMBINANT) 5000 UNITS: KIT at 12:03

## 2017-02-27 RX ADMIN — OXYCODONE HYDROCHLORIDE AND ACETAMINOPHEN 1 TABLET: 10; 325 TABLET ORAL at 22:06

## 2017-02-27 RX ADMIN — BUPROPION HYDROCHLORIDE 150 MG: 150 TABLET, FILM COATED, EXTENDED RELEASE ORAL at 17:17

## 2017-02-27 RX ADMIN — LABETALOL HYDROCHLORIDE 10 MG: 5 INJECTION, SOLUTION INTRAVENOUS at 12:38

## 2017-02-27 RX ADMIN — LIDOCAINE HYDROCHLORIDE 50 MG: 20 INJECTION, SOLUTION EPIDURAL; INFILTRATION; INTRACAUDAL; PERINEURAL at 11:23

## 2017-02-27 RX ADMIN — Medication 10 ML: at 22:55

## 2017-02-27 RX ADMIN — GABAPENTIN 600 MG: 300 CAPSULE ORAL at 17:21

## 2017-02-27 RX ADMIN — ONDANSETRON HYDROCHLORIDE 4 MG: 2 SOLUTION INTRAMUSCULAR; INTRAVENOUS at 20:42

## 2017-02-27 RX ADMIN — Medication 10 ML: at 17:18

## 2017-02-27 RX ADMIN — PROPOFOL 150 MG: 10 INJECTION, EMULSION INTRAVENOUS at 11:23

## 2017-02-27 RX ADMIN — CEFAZOLIN SODIUM 2 G: 2 SOLUTION INTRAVENOUS at 11:16

## 2017-02-27 RX ADMIN — SUCCINYLCHOLINE CHLORIDE 140 MG: 20 INJECTION INTRAMUSCULAR; INTRAVENOUS at 11:23

## 2017-02-27 RX ADMIN — LIDOCAINE HYDROCHLORIDE 50 MG: 20 INJECTION, SOLUTION EPIDURAL; INFILTRATION; INTRACAUDAL; PERINEURAL at 13:28

## 2017-02-27 RX ADMIN — FENTANYL CITRATE 150 MCG: 50 INJECTION, SOLUTION INTRAMUSCULAR; INTRAVENOUS at 11:15

## 2017-02-27 RX ADMIN — NEOSTIGMINE METHYLSULFATE 4 MG: 1 INJECTION INTRAVENOUS at 13:35

## 2017-02-27 RX ADMIN — SODIUM CHLORIDE, SODIUM LACTATE, POTASSIUM CHLORIDE, AND CALCIUM CHLORIDE 25 ML/HR: 600; 310; 30; 20 INJECTION, SOLUTION INTRAVENOUS at 09:32

## 2017-02-27 RX ADMIN — HYDROMORPHONE HYDROCHLORIDE 0.5 MG: 2 INJECTION, SOLUTION INTRAMUSCULAR; INTRAVENOUS; SUBCUTANEOUS at 14:40

## 2017-02-27 RX ADMIN — FENTANYL CITRATE 50 MCG: 50 INJECTION, SOLUTION INTRAMUSCULAR; INTRAVENOUS at 12:46

## 2017-02-27 RX ADMIN — ONDANSETRON 4 MG: 2 INJECTION INTRAMUSCULAR; INTRAVENOUS at 14:43

## 2017-02-27 NOTE — ANESTHESIA POSTPROCEDURE EVALUATION
Post-Anesthesia Evaluation and Assessment    Patient: Hair Steele MRN: [id number]  SSN: [id number]    YOB: 1970  Age: 48 y.o. Sex: female       Cardiovascular Function/Vital Signs  Visit Vitals    BP 97/40    Pulse 71    Temp 36.8 °C (98.2 °F)    Resp 20    Ht 5' 2\" (1.575 m)    Wt 88.1 kg (194 lb 3.2 oz)    SpO2 99%    BMI 35.52 kg/m2       Patient is status post general anesthesia for Procedure(s):  ANTERIOR CERVICAL DISCECTOMY WITH FUSION C4/5 C5/6 C6/7 C-ARM/NUVASIVE. Nausea/Vomiting: None    Postoperative hydration reviewed and adequate. Pain:  Pain Scale 1: Numeric (0 - 10) (02/27/17 1400)  Pain Intensity 1: 7 (02/27/17 1400)   Managed    Neurological Status:   Neuro (WDL): Within Defined Limits (02/27/17 1400)  Neuro  LUE Motor Response: Purposeful (02/27/17 1400)  LLE Motor Response: Purposeful (02/27/17 1400)  RUE Motor Response: Purposeful (02/27/17 1400)  RLE Motor Response: Purposeful (02/27/17 1400)   At baseline    Mental Status and Level of Consciousness: Arousable    Pulmonary Status:   O2 Device: Nasal cannula (02/27/17 1405)   Adequate oxygenation and airway patent    Complications related to anesthesia: None    Post-anesthesia assessment completed.  No concerns    Signed By: Jono Crespo MD     February 27, 2017

## 2017-02-27 NOTE — PROGRESS NOTES
Problem: Mobility Impaired (Adult and Pediatric)  Goal: *Acute Goals and Plan of Care (Insert Text)  Physical Therapy Goals  Initiated 2/27/2017 and to be accomplished within 7 day(s)  1. Patient will move from supine to sit and sit to supine , scoot up and down and roll side to side in bed with modified independence. 2. Patient will transfer from bed to chair and chair to bed with modified independence using the least restrictive device. 3. Patient will perform sit to stand with modified independence. 4. Patient will ambulate with modified independence for >200 feet with the least restrictive device. 5. Patient will ascend/descend 12 stairs with 1 handrail(s) with modified independence. PHYSICAL THERAPY EVALUATION     Patient: Valorie Sun (51 y.o. female)  Date: 2/27/2017  Primary Diagnosis: HNP (herniated nucleus pulposus), cervical [M50.20]  Cervical spinal stenosis [M48.02]  Procedure(s) (LRB):  ANTERIOR CERVICAL DISCECTOMY WITH FUSION C4/5 C5/6 C6/7 C-ARM/NUVASIVE (N/A) Day of Surgery   Precautions: Cervical         ASSESSMENT :  Pt is 44yo F admitted to hospital for ACDF and presents alert and agreeable to eval. Pt educated on cervical precautions and handout left with patient. Pt is nauseated and sat EOB several mins in attempt to allow sx to sg. Pt then stood from bed with CG and used RW to amb 10ft; RW used as pt off balance from dry heaving during ambulation and requesting to get back into bed. Pt transferred stand to sit to sup with CG assist and was left resting bed with call bell by her side. Pt instructed to ambulate later to bathroom or around hallway with staff if she felt less nauseated. Pt currently demonstrates decreased mobility and endurance and  will benefit from skilled intervention to address the above impairments.   Patients rehabilitation potential is considered to be Good  Factors which may influence rehabilitation potential include:   [ ]         None noted  [ ] Mental ability/status  [X]         Medical condition  [X]         Home/family situation and support systems  [X]         Safety awareness  [X]         Pain tolerance/management  [ ]         Other:        PLAN :  Recommendations and Planned Interventions:  [X]           Bed Mobility Training             [X]    Neuromuscular Re-Education  [X]           Transfer Training                   [ ]    Orthotic/Prosthetic Training  [X]           Gait Training                          [ ]    Modalities  [X]           Therapeutic Exercises          [ ]    Edema Management/Control  [X]           Therapeutic Activities            [X]    Patient and Family Training/Education  [ ]           Other (comment):     Frequency/Duration: Patient will be followed by physical therapy 1-2 times per day/4-7 days per week to address goals. Discharge Recommendations: Home Health  Further Equipment Recommendations for Discharge: N/A       G-CODES      Mobility  Current  CI= 1-19%   Goal  CI= 1-19%. The severity rating is based on the Level of Assistance required for Functional Mobility and ADLs.            G-CODES      Eval Complexity: History: MEDIUM  Complexity : 1-2 comorbidities / personal factors will impact the outcome/ POC Exam:LOW Complexity : 1-2 Standardized tests and measures addressing body structure, function, activity limitation and / or participation in recreation  Presentation: LOW Complexity : Stable, uncomplicated  Clinical Decision Making:Low Complexity   Overall Complexity:LOW       SUBJECTIVE:   Patient stated I'm sorry I couldn't do more. I just feel so weird.       OBJECTIVE DATA SUMMARY:       Past Medical History:   Diagnosis Date    Psychiatric disorder       depression     Past Surgical History:   Procedure Laterality Date    HX GYN         hysterectomy    HX GYN         c/s     Prior Level of Function/Home Situation: Pt lives with  in 2 story home with bed/bath on 2nd floor and 6STE with HR.  Pt was independent prior to admit with mobility and IADL's. Home Situation  One/Two Story Residence: Two story  # of Interior Steps: 18  Living Alone: No  Patient Expects to be Discharged to[de-identified] Private residence  Critical Behavior:     Pleasant, cooperative, A&Ox4  Strength:    Strength: Within functional limits (BLE 5/5 throughout)   Tone & Sensation:    Sensation: Intact (BLE to LT)   Range Of Motion:  AROM: Within functional limits (BLE)   Functional Mobility:  Bed Mobility:   Supine to Sit: Stand-by asssistance  Sit to Supine: Stand-by asssistance   Transfers:  Sit to Stand: Contact guard assistance  Stand to Sit: Contact guard assistance      Balance:   Sitting: Intact  Standing: Impaired; With support  Standing - Static: Good  Standing - Dynamic : Fair  Ambulation/Gait Training:  Distance (ft): 10 Feet (ft)  Assistive Device: Walker, rolling  Ambulation - Level of Assistance: Stand-by asssistance    Base of Support: Widened   Interventions: Visual/Demos     Pain:  Pt reports 5/10 pain or discomfort prior to treatment. Pt reports 5/10 pain or discomfort post treatment. Activity Tolerance:   Participation in gait training limited as pt dry heaving while ambulating; required pt to return to bed. Pt used RW 2/2 to nausea, however as sx improve, pt likely will not need assistance of RW. Please refer to the flowsheet for vital signs taken during this treatment. After treatment:   [X]         Patient left in no apparent distress sitting up in chair  [ ]         Patient left in no apparent distress in bed  [X]         Call bell left within reach  [X]         Nursing notified  [X]         Caregiver present  [ ]         Bed alarm activated      COMMUNICATION/EDUCATION:   [X]         Fall prevention education was provided and the patient/caregiver indicated understanding. [X]         Patient/family have participated as able in goal setting and plan of care.   [X]         Patient/family agree to work toward stated goals and plan of care. [ ]         Patient understands intent and goals of therapy, but is neutral about his/her participation. [ ]         Patient is unable to participate in goal setting and plan of care.      Thank you for this referral.  Scott Peters, PT   Time Calculation: 20 mins

## 2017-02-27 NOTE — INTERVAL H&P NOTE
H&P Update: Sophia Parish was seen and examined. History and physical has been reviewed. The patient has been examined.  There have been no significant clinical changes since the completion of the originally dated History and Physical.    Signed By: Debra Walton MD     February 27, 2017 10:15 AM

## 2017-02-27 NOTE — IP AVS SNAPSHOT
[address] [place] [address] Patient: Jorden Llanos MRN: [id number] :1970 You are allergic to the following No active allergies Recent Documentation Height  
  
  
  
  
  
 1.575 m Emergency Contacts Name Discharge Info Relation Home Work Mobile [address] [facility] CAREGIVER [3] Boyiend [17] [phone] About your hospitalization You were admitted on:  2017 You last received care in the:  [facility] You were discharged on:  2017 Unit phone number:  [phone] Why you were hospitalized Your primary diagnosis was:  Not on File Providers Seen During Your Hospitalizations Provider Role Specialty Primary office phone Marely Ramos MD Attending Provider Orthopedic Surgery [phone] Your Primary Care Physician (PCP) Primary Care Physician Office Phone Office Fax [address] , [address] [place] [address] [phone] Follow-up Information Follow up With Details Comments Contact Info Janice Palacio MD On 3/15/2017 March 15, 2017 @ 2:00 pm with Dr. Candida Casas office is booked this was the only available appt that the office could set the patient up for follow-up appt. [address] [address] 
[phone] Jesse Malik NP On 3/13/2017 2017 @ 09:50 with Angella Solano. [address] [facility] 
[phone] Your Appointments 2017  9:50 AM EDT  
POST OP with Jesse Malik NP  
[facility] ([facility]) [address] [facility] [address]  
[phone] 2017  8:00 AM EDT  
POST OP with Marely Ramos MD  
[facility] [address] [facility]) [address] [facility] [address]  
[phone] Current Discharge Medication List  
  
START taking these medications Dose & Instructions Dispensing Information Comments Morning Noon Evening Bedtime  
 oxyCODONE-acetaminophen  mg per tablet Commonly known as:  PERCOCET 10 Your next dose is: Today, Tomorrow Other:  _________ Dose:  1 Tab Take 1 Tab by mouth every six (6) hours as needed. Max Daily Amount: 4 Tabs. Quantity:  40 Tab Refills:  0 CONTINUE these medications which have NOT CHANGED Dose & Instructions Dispensing Information Comments Morning Noon Evening Bedtime ALLEGRA-D 12 HOUR  mg Tb12 Generic drug:  fexofenadine-pseudoephedrine Your next dose is: Today, Tomorrow Other:  _________ Dose:  1 Tab Take 1 Tab by mouth every twelve (12) hours. Refills:  0 CHANTIX CONTINUING MONTH YVONNE 1 mg tablet Generic drug:  varenicline Your next dose is: Today, Tomorrow Other:  _________ Dose:  1 mg Take 1 mg by mouth two (2) times daily (after meals). Refills:  0  
     
   
   
   
  
 cyclobenzaprine 10 mg tablet Commonly known as:  FLEXERIL Your next dose is: Today, Tomorrow Other:  _________ Refills:  0  
     
   
   
   
  
 gabapentin 600 mg tablet Commonly known as:  NEURONTIN Your next dose is: Today, Tomorrow Other:  _________ Dose:  600 mg Take 1 Tab by mouth four (4) times daily. Quantity:  120 Tab Refills:  1 HYDROcodone-acetaminophen 5-325 mg per tablet Commonly known as:  Dulce Punt Your next dose is: Today, Tomorrow Other:  _________ Refills:  0  
     
   
   
   
  
 melatonin Tab tablet Your next dose is: Today, Tomorrow Other:  _________  Dose:  10 mg  
 Take 10 mg by mouth nightly. Refills:  0 WELLBUTRIN  mg SR tablet Generic drug:  buPROPion SR Your next dose is: Today, Tomorrow Other:  _________ Dose:  150 mg Take 150 mg by mouth two (2) times a day. Refills:  0 Where to Get Your Medications Information on where to get these meds will be given to you by the nurse or doctor. ! Ask your nurse or doctor about these medications  
  oxyCODONE-acetaminophen  mg per tablet Discharge Instructions None Discharge Orders None Introducing [facility] SERVICES! Dear Medardo Wong: Thank you for requesting a [employer] account. Our records indicate that you already have an active DataProm account. You can access your account anytime at [email or web address] Did you know that you can access your hospital and ER discharge instructions at any time in DataProm? You can also review all of your test results from your hospital stay or ER visit. Additional Information If you have questions, please visit the Frequently Asked Questions section of the Data[employer] website at [email or web address] Remember, DataProm is NOT to be used for urgent needs. For medical emergencies, dial 911. Now available from your iPhone and Android! General Information Please provide this summary of care documentation to your next provider. Patient Signature:  ____________________________________________________________ Date:  ____________________________________________________________  
  
Renita Beltran Provider Signature:  ____________________________________________________________ Date:  ____________________________________________________________

## 2017-02-27 NOTE — IP AVS SNAPSHOT
Current Discharge Medication List  
  
Take these medications at their scheduled times Dose & Instructions Dispensing Information Comments Morning Noon Evening Bedtime ALLEGRA-D 12 HOUR  mg Tb12 Generic drug:  fexofenadine-pseudoephedrine Your next dose is: Today, Tomorrow Other:  ____________ Dose:  1 Tab Take 1 Tab by mouth every twelve (12) hours. Refills:  0 CHANTIX CONTINUING MONTH YVONNE 1 mg tablet Generic drug:  varenicline Your next dose is: Today, Tomorrow Other:  ____________ Dose:  1 mg Take 1 mg by mouth two (2) times daily (after meals). Refills:  0  
     
   
   
   
  
 gabapentin 600 mg tablet Commonly known as:  NEURONTIN Your next dose is: Today, Tomorrow Other:  ____________ Dose:  600 mg Take 1 Tab by mouth four (4) times daily. Quantity:  120 Tab Refills:  1  
     
   
   
   
  
 melatonin Tab tablet Your next dose is: Today, Tomorrow Other:  ____________ Dose:  10 mg Take 10 mg by mouth nightly. Refills:  0 WELLBUTRIN  mg SR tablet Generic drug:  buPROPion SR Your next dose is: Today, Tomorrow Other:  ____________ Dose:  150 mg Take 150 mg by mouth two (2) times a day. Refills:  0 Take these medications as needed Dose & Instructions Dispensing Information Comments Morning Noon Evening Bedtime  
 oxyCODONE-acetaminophen  mg per tablet Commonly known as:  PERCOCET 10 Your next dose is: Today, Tomorrow Other:  ____________ Dose:  1 Tab Take 1 Tab by mouth every six (6) hours as needed. Max Daily Amount: 4 Tabs. Quantity:  40 Tab Refills:  0 Take these medications as directed Dose & Instructions Dispensing Information Comments Morning Noon Evening Bedtime  
 cyclobenzaprine 10 mg tablet Commonly known as:  FLEXERIL Your next dose is: Today, Tomorrow Other:  ____________ Refills:  0 HYDROcodone-acetaminophen 5-325 mg per tablet Commonly known as:  Clemetine [place] Your next dose is: Today, Tomorrow Other:  ____________ Refills:  0 Where to Get Your Medications Information about where to get these medications is not yet available ! Ask your nurse or doctor about these medications  
  oxyCODONE-acetaminophen  mg per tablet

## 2017-02-27 NOTE — ROUTINE PROCESS
TRANSFER - OUT REPORT:    Verbal report given to Carmel on Chandrika Armas  being transferred to room 559 for routine progression of care       Report consisted of patients Situation, Background, Assessment and   Recommendations(SBAR). Information from the following report(s) SBAR, OR Summary, Intake/Output and MAR was reviewed with the receiving nurse. Opportunity for questions and clarification was provided.       Patient transported with:   O2 @ 2 liters  Registered Nurse  [facility]

## 2017-02-27 NOTE — BRIEF OP NOTE
BRIEF OPERATIVE NOTE    Date of Procedure: 2/27/2017   Preoperative Diagnosis: HNP (herniated nucleus pulposus), cervical [M50.20]  Cervical spinal stenosis [M48.02]  Postoperative Diagnosis: HNP (herniated nucleus pulposus), cervical Cervical spinal stenosis    Procedure(s):  ANTERIOR CERVICAL DISCECTOMY WITH FUSION C4/5 C5/6 C6/7 C-ARM/NUVASIVE  Surgeon(s) and Role:     * Marely Ramos MD - Primary            Surgical Staff:  Circ-1: Burton Prieto RN  Circ-Relief: Ian Venegas RN  Radiology Technician: Madelyn Jesus  Scrub Tech-1: Nicky Portillo  Surg Asst-1: Brii Duran  Event Time In   Incision Start 1089   Incision Close      Anesthesia: General   Estimated Blood Loss: 25  Specimens: * No specimens in log *   Findings: spondylosis, stenosis, no large hnp   Complications: 0  Implants:   Implant Name Type Inv.  Item Serial No.  Lot No. LRB No. Used Action   WAX BNE 2.5 GR IVORY --  - [id number]  WAX BNE 2.5 GR IVORY --   SURGICAL SPECIALTIES JAVIER [id number] N/A 1 Implanted   SPACER CERV TRIAD [id number] -- ALLOGRAFT - [id number]  SPACER CERV TRIAD [id number] -- ALLOGRAFT [id number] NUVASIVE  N/A 1 Implanted   SPACER CERV TRIAD [id number] -- ALLOGRAFT - [id number]  SPACER CERV TRIAD [id number] -- ALLOGRAFT [id number] NUVASIVE [id number] N/A 1 Implanted   SPACER CERV TRIAD [id number] -- ALLOGRAFT - [id number]  SPACER CERV TRIAD [id number] -- ALLOGRAFT [id number] NUVASIVE [id number] N/A 1 Implanted   PLATE SPNE ARCHON 3-LVL 54MM --  - [id number]  PLATE SPNE ARCHON 3-LVL 54MM --   NUVASIVE N/A N/A 1 Implanted   SCR SPNE ST GILES ARCHON 4.5X15 --  - [id number]  SCR SPNE ST GILES ARCHON 4.5X15 --   NUVASIVE N/A N/A 1 Implanted   SCR SPNE SD GILES ARCHON 4X15MM --  - [id number]   SCR SPNE SD GILES ARCHON 4X15MM --    NUVASIVE N/A N/A 5 Implanted

## 2017-02-27 NOTE — ANESTHESIA PREPROCEDURE EVALUATION
Anesthetic History   No history of anesthetic complications            Review of Systems / Medical History  Patient summary reviewed and pertinent labs reviewed    Pulmonary          Undiagnosed apnea      Comments: Current Smoker? YES       Elective Surgery? Yes       Abstained from smoking 24 hours prior to anesthesia? NO    Risk Factors for Postoperative nausea/vomiting:       History of postoperative nausea/vomiting? NO       Female? YES       Motion sickness? NO       Intended opioid administration for postoperative analgesia? YES   Neuro/Psych         Neuromuscular disease and psychiatric history    Comments: MRI    1. Degenerative changes most notable at C4-C5, C5-C6, C6-C7.     -C6-C7 large left paracentral/foraminal disc protrusion abuts the left ventral  cord with overall mild spinal canal stenosis, and likely abuts the left C7 and  C8 nerve roots as above.     -C4-C5 and C5-C6 disc bulges mildly abutting the ventral cord, with overall mild  spinal canal stenosis. C5-C6 moderate right foraminal stenosis.     -Lesser degree degenerative changes elsewhere. See details above.     2. Caudal extension of enlarged left submandibular gland versus an accessory  left submandibular gland. Correlate clinically and consider dedicated imaging if  warranted.    Cardiovascular                  Exercise tolerance: >4 METS     GI/Hepatic/Renal     GERD: well controlled           Endo/Other        Obesity     Other Findings   Comments: CXR : Minor mid dorsal spine scoliosis with right convexity    Current Smoker? YES       Elective Surgery? Yes       Abstained from smoking 24 hours prior to anesthesia? YES    Risk Factors for Postoperative nausea/vomiting:       History of postoperative nausea/vomiting? NO       Female? YES       Motion sickness? NO       Intended opioid administration for postoperative analgesia?   YES         Physical Exam    Airway  Mallampati: II  TM Distance: 4 - 6 cm  Neck ROM: decreased range of motion   Mouth opening: Normal     Cardiovascular  Regular rate and rhythm,  S1 and S2 normal,  no murmur, click, rub, or gallop             Dental  No notable dental hx       Pulmonary  Breath sounds clear to auscultation               Abdominal  GI exam deferred       Other Findings            Anesthetic Plan    ASA: 2  Anesthesia type: general          Induction: Intravenous

## 2017-02-28 ENCOUNTER — HOME HEALTH ADMISSION (OUTPATIENT)
Dept: HOME HEALTH SERVICES | Facility: HOME HEALTH | Age: 47
End: 2017-02-28
Payer: COMMERCIAL

## 2017-02-28 VITALS
RESPIRATION RATE: 16 BRPM | SYSTOLIC BLOOD PRESSURE: 103 MMHG | TEMPERATURE: 97.4 F | HEART RATE: 87 BPM | WEIGHT: 194.2 LBS | HEIGHT: 62 IN | OXYGEN SATURATION: 95 % | DIASTOLIC BLOOD PRESSURE: 68 MMHG | BODY MASS INDEX: 35.74 KG/M2

## 2017-02-28 PROCEDURE — 74011000250 HC RX REV CODE- 250: Performed by: ORTHOPAEDIC SURGERY

## 2017-02-28 PROCEDURE — 74011250637 HC RX REV CODE- 250/637: Performed by: ORTHOPAEDIC SURGERY

## 2017-02-28 PROCEDURE — 97165 OT EVAL LOW COMPLEX 30 MIN: CPT

## 2017-02-28 PROCEDURE — 97116 GAIT TRAINING THERAPY: CPT

## 2017-02-28 PROCEDURE — 99218 HC RM OBSERVATION: CPT

## 2017-02-28 PROCEDURE — 74011250636 HC RX REV CODE- 250/636: Performed by: ORTHOPAEDIC SURGERY

## 2017-02-28 PROCEDURE — 74011000258 HC RX REV CODE- 258: Performed by: ORTHOPAEDIC SURGERY

## 2017-02-28 PROCEDURE — 77030027138 HC INCENT SPIROMETER -A

## 2017-02-28 PROCEDURE — 97535 SELF CARE MNGMENT TRAINING: CPT

## 2017-02-28 RX ORDER — OXYCODONE AND ACETAMINOPHEN 10; 325 MG/1; MG/1
1 TABLET ORAL
Qty: 40 TAB | Refills: 0 | Status: SHIPPED | OUTPATIENT
Start: 2017-02-28 | End: 2018-04-23

## 2017-02-28 RX ADMIN — BUPROPION HYDROCHLORIDE 150 MG: 150 TABLET, FILM COATED, EXTENDED RELEASE ORAL at 09:45

## 2017-02-28 RX ADMIN — Medication 5 ML: at 06:00

## 2017-02-28 RX ADMIN — PROMETHAZINE HYDROCHLORIDE 25 MG: 25 INJECTION INTRAMUSCULAR; INTRAVENOUS at 00:47

## 2017-02-28 RX ADMIN — GABAPENTIN 600 MG: 300 CAPSULE ORAL at 09:45

## 2017-02-28 RX ADMIN — FAMOTIDINE 20 MG: 10 INJECTION, SOLUTION INTRAVENOUS at 09:45

## 2017-02-28 RX ADMIN — CEFAZOLIN SODIUM 2 G: 2 SOLUTION INTRAVENOUS at 03:40

## 2017-02-28 RX ADMIN — OXYCODONE HYDROCHLORIDE AND ACETAMINOPHEN 1 TABLET: 10; 325 TABLET ORAL at 03:40

## 2017-02-28 RX ADMIN — VARENICLINE TARTRATE 1 MG: 1 TABLET, FILM COATED ORAL at 09:45

## 2017-02-28 RX ADMIN — OXYCODONE HYDROCHLORIDE AND ACETAMINOPHEN 1 TABLET: 10; 325 TABLET ORAL at 09:45

## 2017-02-28 NOTE — PROGRESS NOTES
Care Management Interventions  PCP Verified by CM: Yes  Last Visit to PCP: 02/21/17  Transition of Care Consult (CM Consult): [address]: Yes  Physical Therapy Consult: Yes  Occupational Therapy Consult: Yes  Current Support Network: Other  Confirm Follow Up Transport: Friends  Plan discussed with Pt/Family/Caregiver: Yes  Freedom of Choice Offered: Yes (For home health)  Discharge Location  Discharge Placement: Home with home health     Pt is a 48year old female admitted for HNP, surgical stenosis. Pt is alert and oriented in room with a friend at her bedside. Pt consents to guest remaining in room during visit. Pt resides with her chayito [place] and her plan is to return home. Pt's friend will be assisting her with transportation home. Pt indicates being independent with ADLs and ambulation prior to admission. Pt is being discharged home with home health. [facility]OC presented and pt chooses [facility]. Agency Kelvin Knight) informed and referral submitted.

## 2017-02-28 NOTE — PROGRESS NOTES
vss afeb  Neuro intact  Had post op nausea  resolved with meds  Looks great  Arms improved  Drain 0  Will dc once I confirm she tolerates diet

## 2017-02-28 NOTE — ROUTINE PROCESS
2000 Patient is alert and oriented times three with nausea and some vomiting. Patient looks pale. Drain had about 3 cc of serisangious drainage in it. Neuro intact. Patient has not voided yet since surgery  2100 Called Dr for something strong for her nausea and vomiting. 2130 Walked patient to the bathroom she couldn't void at this time  0000 Patient is alert and oriented times three nausea and vomiting have resolved. Her incision is intact and dry and neuro is intact. Still no void  0130 Patient ambulated to the bathroom and voided a large amount. 0400 Patient is alert and oriented times three doing much better this morning. Her  Dressing is clean dry and intact and neuro is intact. Voiding and ambulatory.

## 2017-02-28 NOTE — HOME CARE
Rec HC order - d/c noted for today [facility] will follow per Dr. Cherri Sauceda protocol - no dme needs at this time - D Mare ENRIQUEZ

## 2017-02-28 NOTE — PROGRESS NOTES
Problem: Self Care Deficits Care Plan (Adult)  Goal: *Acute Goals and Plan of Care (Insert Text)  Occupational Therapy Goals  Initiated 2/28/2017     1. Patient will perform upper body dressing with modified independence with adaptive strategies. -achieved 2/28/17  Outcome: Resolved/Met Date Met:  02/28/17  OCCUPATIONAL THERAPY EVALUATION/DISCHARGE     Patient: Tg Mcmanus (44 y.o. female)  Date: 2/28/2017  Primary Diagnosis: HNP (herniated nucleus pulposus), cervical [M50.20]  Cervical spinal stenosis [M48.02]  Procedure(s) (LRB):  ANTERIOR CERVICAL DISCECTOMY WITH FUSION C4/5 C5/6 C6/7 C-ARM/NUVASIVE (N/A) 1 Day Post-Op   Precautions:   Fall (cervical)      ASSESSMENT AND RECOMMENDATIONS:  Based on the objective data described below, the patient was mod I with functional mobility with no AD and was independent/modified independent with self care tasks with adaptive strategies. Patient had decreased, but functional BUE AROM, secondary to cervical precautions, and WFL  strength. Patient was independent with simulated toilet transfer. Educated patient on cervical precautions as it relates to function; patient needed min verbal cues to recall and to implement during functional tasks. Educated patient on adaptive strategies during UE/LE dressing while adhering to cervical precautions; patient reported/demonstrated understanding when donning underwear/pants/shirt. Patient deferred to PT for mobility. Discharge Recommendations: Home Health  Further Equipment Recommendations for Discharge: N/A       COMPLEXITY      Eval Complexity: History: LOW Complexity : Brief history review ; Examination: LOW Complexity : 1-3 performance deficits relating to physical, cognitive , or psychosocial skils that result in activity limitations and / or participation restrictions ;  Decision Making:LOW Complexity : No comorbidities that affect functional and no verbal or physical assistance needed to complete eval tasks  Assessment: Low Complexity          G-CODES:      Self Care  Current  CI= 1-19%   Goal  CI= 1-19%   D/C  CI= 1-19%. The severity rating is based on the Level of Assistance required for Functional Mobility and ADLs. SUBJECTIVE:   Patient stated I need to go to the bathroom.       OBJECTIVE DATA SUMMARY:       Past Medical History:   Diagnosis Date    Psychiatric disorder       depression     Past Surgical History:   Procedure Laterality Date    HX GYN         hysterectomy    HX GYN         c/s     Prior Level of Function/Home Situation:Patient reported she was independent in basic self care tasks and functional mobility PTA. Home Situation  Home Environment: Private residence  # Steps to Enter: 3  One/Two Story Residence: Two story  # of Interior Steps: 18  Height of Each Step (in): 12 inches  Interior Rails: Both  Lift Chair Available: No  Living Alone: No  Support Systems: Family member(s)  Patient Expects to be Discharged to[de-identified] Private residence  Current DME Used/Available at Home: Shower chair  Tub or Shower Type: Tub/Shower combination  [X]     Right hand dominant       [ ]     Left hand dominant  Cognitive/Behavioral Status:  Neurologic State: Alert  Orientation Level: Oriented X4  Cognition: Follows commands     Skin: No skin changes noted     Edema: No edema noted     Vision/Perceptual:       Acuity: Within Defined Limits       Coordination:  Coordination: Within functional limits (BUEs)     Balance:  Sitting: Intact  Standing: Intact      Strength:  Strength:  Within functional limits ( strength)      Tone & Sensation:  Tone: Normal (BUEs)  Sensation: Intact (BUEs)         Range of Motion:  AROM: Within functional limits (within cervical precautions)      Functional Mobility and Transfers for ADLs:  Bed Mobility:    Patient sitting on EOB upon arrival  Scooting: Independent  Transfers:  Sit to Stand: Modified independent              Toilet Transfer : Modified independent ADL Assessment:(clicnial judgement based on functional mobility)  Feeding: Independent     Oral Facial Hygiene/Grooming: Modified Independent     Bathing: Modified independent     Upper Body Dressing: Modified independent     Lower Body Dressing: Modified independent     Toileting: Independent     Pain:  Pt reports 2/10 pain or discomfort prior to treatment. Pt reports 2/10 pain or discomfort post treatment. Activity Tolerance:   Good     Please refer to the flowsheet for vital signs taken during this treatment. After treatment:   [X]  Patient left in no apparent distress sitting up in chair  [ ]  Patient left in no apparent distress in bed  [X]  Call bell left within reach  [X]  Nursing notified; pain and one instance of difficulty swallowing  [ ]  Caregiver present  [ ]  Bed alarm activated      COMMUNICATION/EDUCATION:   Communication/Collaboration:  [X]      Home safety education was provided and the patient/caregiver indicated understanding. [X]      Patient/family have participated as able and agree with findings and recommendations. [ ]      Patient is unable to participate in plan of care at this time.      Raymond Zarate OTR/L  Time Calculation: 26 mins

## 2017-02-28 NOTE — ROUTINE PROCESS
Bedside and Verbal shift change report given to Faraz Howard RN  (oncoming nurse) by Olga Pop RN  (offgoing nurse). Report included the following information SBAR, Kardex, ED Summary, Intake/Output, MAR and Recent Results.

## 2017-02-28 NOTE — OP NOTES
[address]    Name:  Angel Rios  MR#:  [id number]  :  1970  Account #:  [de-identified]  Date of Adm:  2017  Date of Surgery:  2017      SURGEON: Christy Ramírez MD    PREOPERATIVE DIAGNOSIS: Cervical spondylosis with stenosis,  herniated nucleus pulposus, C4-5, C5-6, C6-7. POSTOPERATIVE DIAGNOSIS: Cervical spondylosis with stenosis,  herniated nucleus pulposus, C4-5, C5-6, C6-7. PROCEDURES PERFORMED  1. Anterior cervical decompression and fusion C4-5, C5-6, C6-7.  2. Structural allograft x3 cervical plate fixation H6-8, C5-6, C6-C7 with  NuVasive locking plate and screws. ANESTHESIA:      FINDINGS: The patient had spondylitic stenosis at each level. There  were large marginal osteophytes, uncinate spurring, thickened  posterior longitudinal ligament and disk protrusions causing left greater  than right lateral recess and foraminal stenosis. There were  inflammatory changes the longus colli musculature in the midline. The  large extruded discal mass anticipated at C6-7 and the lateral recess  was not appreciated surgically as the MRI had been done 8 months  earlier. DESCRIPTION OF PROCEDURE: Following induction of endotracheal  anesthesia, the patient placed with head in neutral position. The  patient was prepped and draped in the usual fashion. A right-sided  approach was utilized. Sternocleidomastoid and great vessels  mobilized laterally. The esophagus and trachea were mobilized  medially with blunt finger dissection. Prevertebral fascia was entered  and C-arm image verified our surgical level. Longus colli musculature  was mobilized and defined the disk spaces at C4-5, C5-6, and C6-7. Then the large anterior marginal osteophytes in the neck had a kyphotic  posture. Beginning at C6-7, Edmonton pins placed in the segments above  and below. Cloward self-retaining retractor was placed to cover longus  coli musculature bilaterally.  Annular tissues incised and a radical  diskectomy performed at C6-7. We found the posterior marginal  osteophytes and thinned them. The marginal osteophytes were  resected with the uncinates and marginal osteophytes were thinned  with a high-speed bur, resected with 4-0 Cheryl curet and sella punch. Posterior longitudinal ligament was taken and I was able to expose the  dura from the uncinate resecting them. While there was uncinate  spurring and prominence marginal osteophytes with compression of  the neural elements. A large extruded nuclear mass I had  anticipated from the MRI was not apparent. Exposed laterally so I  could palpate out the foramina, palpated superiorly and inferiorly. Thorough decompression was accomplished, endplates prepared and  a #8 structural allograft was selected and tamped firmly into place with  excellent bony apposition. Attention then turned sequentially at C5-6  and C4-5, where the procedure was repeated at each level. Marginal  osteophytes anteriorly and posteriorly were resected with the uncinate  spurs resected. Posterior longitudinal ligament resected. Thorough  decompression accomplished of the epidural space with improvement  in the segmental kyphosis that was present. A #8 structural allograft  was utilized. After decompression and   each of the segments,  anterior cervical locking plate was selected and affixed to the spine  with 2 screws in C4, 2 in C5, 2 in C6, and 2 in C7 with the locking  device engaged. Bone quality was fair. Fixation appeared to be  adequate. The locking device was engaged. The wound was copiously  irrigated. Bleeding was encountered in the epidural space  and controlled with Gelfoam and thrombin. Further bleeding was  encountered from the mobilized longus colli musculature which was  controlled with bipolar electrocautery. A deep drain was placed. The  neck was closed in layers and the skin closed with a subcuticular  suture and Dermabond.  Vancomycin powder was instilled for infection  prophylaxis. COMPLICATIONS: There were no complications. SPECIMENS REMOVED: No specimens. DISPOSITION: The patient went to the recovery room in good and  stable condition.         Ashia Blanchard MD    [address] [facility] / Katlyn Hampton  D:  02/27/2017   13:34  T:  02/27/2017   21:01  Job #:  [id number]

## 2017-02-28 NOTE — ROUTINE PROCESS
Bedside and Verbal shift change report given to Monisha Dutta  (oncoming nurse) by Florence Walker RN  (offgoing nurse). Report included the following information SBAR, Kardex, ED Summary, Intake/Output, MAR and Recent Results.

## 2017-02-28 NOTE — PROGRESS NOTES
Discharge instructions and prescription explained and given to patient. Patient armband removed and shredded.

## 2017-02-28 NOTE — PROGRESS NOTES
Problem: Mobility Impaired (Adult and Pediatric)  Goal: *Acute Goals and Plan of Care (Insert Text)  Physical Therapy Goals  Initiated 2/27/2017 and to be accomplished within 7 day(s)  1. Patient will move from supine to sit and sit to supine , scoot up and down and roll side to side in bed with modified independence. 2. Patient will transfer from bed to chair and chair to bed with modified independence using the least restrictive device. 3. Patient will perform sit to stand with modified independence. 4. Patient will ambulate with modified independence for >200 feet with the least restrictive device. 5. Patient will ascend/descend 12 stairs with 1 handrail(s) with modified independence. Outcome: Progressing Towards Goal  PHYSICAL THERAPY TREATMENT     Patient: Sophia Parish (2 y.o. female)  Date: 2/28/2017  Diagnosis: HNP (herniated nucleus pulposus), cervical [M50.20]  Cervical spinal stenosis [M48.02] <principal problem not specified>  Procedure(s) (LRB):  ANTERIOR CERVICAL DISCECTOMY WITH FUSION C4/5 C5/6 C6/7 C-ARM/NUVASIVE (N/A) 1 Day Post-Op  Precautions: Fall (cervical)  Chart, physical therapy assessment, plan of care and goals were reviewed. ASSESSMENT:  Pt is independent with gait, mod I for sit<>stand tranfers. Pt able to verbalize cervical precautions, educated on safety measures in the home, strategies for compliance with precautions, activity recommendations. Progression toward goals:  [X]      Improving appropriately and progressing toward goals  [ ]      Improving slowly and progressing toward goals  [ ]      Not making progress toward goals and plan of care will be adjusted       PLAN:  Patient continues to benefit from skilled intervention to address the above impairments. Continue treatment per established plan of care.   Discharge Recommendations:  Home Health  Further Equipment Recommendations for Discharge:  N/A       SUBJECTIVE:   Patient stated I am ready to go home.  Mobility [id number] Current  CI= 1-19%   Goal  CI= 1-19%. The severity rating is based on the Other Pt limited by post- op cervical precautions      OBJECTIVE DATA SUMMARY:   Critical Behavior:  Neurologic State: Alert  Orientation Level: Oriented X4  Cognition: Follows commands  Functional Mobility Training:  Scooting: Independent  Transfers:  Sit to Stand: Modified independent  Stand to Sit: Modified independent  Balance:  Sitting: Intact  Standing: Intact  Ambulation/Gait Training:  Distance (ft): 220 Feet (ft)  Ambulation - Level of Assistance: Independent  Base of Support: Widened  Speed/Christine: Pace decreased (<100 feet/min)  Step Length: Left shortened;Right shortened  Stairs:  Number of Stairs Trained: 4  Stairs - Level of Assistance: Modified independent  Pain:  Pt pain was reported as  0 pre-treatment. Pt pain was reported as 0 post-treatment. Intervention: n/a     Activity Tolerance:   Good   Please refer to the flowsheet for vital signs taken during this treatment.   After treatment:   [X] Patient left in no apparent distress sitting up in chair  [ ] Patient left in no apparent distress in bed  [X] Call bell left within reach  [ ] Nursing notified  [X] Caregiver present  [ ] Bed alarm activated      Ben Osborne PTA   Time Calculation: 10 mins

## 2017-03-01 ENCOUNTER — HOME CARE VISIT (OUTPATIENT)
Dept: SCHEDULING | Facility: HOME HEALTH | Age: 47
End: 2017-03-01
Payer: COMMERCIAL

## 2017-03-01 VITALS
DIASTOLIC BLOOD PRESSURE: 60 MMHG | HEART RATE: 72 BPM | SYSTOLIC BLOOD PRESSURE: 108 MMHG | OXYGEN SATURATION: 96 % | TEMPERATURE: 98.4 F

## 2017-03-01 PROCEDURE — G0151 HHCP-SERV OF PT,EA 15 MIN: HCPCS

## 2017-03-01 PROCEDURE — 400013 HH SOC

## 2017-03-02 ENCOUNTER — HOME CARE VISIT (OUTPATIENT)
Dept: HOME HEALTH SERVICES | Facility: HOME HEALTH | Age: 47
End: 2017-03-02
Payer: COMMERCIAL

## 2017-03-03 ENCOUNTER — OFFICE VISIT (OUTPATIENT)
Dept: ORTHOPEDIC SURGERY | Age: 47
End: 2017-03-03

## 2017-03-03 ENCOUNTER — HOME CARE VISIT (OUTPATIENT)
Dept: SCHEDULING | Facility: HOME HEALTH | Age: 47
End: 2017-03-03
Payer: COMMERCIAL

## 2017-03-03 ENCOUNTER — HOME CARE VISIT (OUTPATIENT)
Dept: HOME HEALTH SERVICES | Facility: HOME HEALTH | Age: 47
End: 2017-03-03
Payer: COMMERCIAL

## 2017-03-03 VITALS
BODY MASS INDEX: 35.48 KG/M2 | DIASTOLIC BLOOD PRESSURE: 66 MMHG | RESPIRATION RATE: 18 BRPM | HEIGHT: 62 IN | HEART RATE: 79 BPM | SYSTOLIC BLOOD PRESSURE: 132 MMHG | WEIGHT: 192.8 LBS | OXYGEN SATURATION: 99 % | TEMPERATURE: 97.6 F

## 2017-03-03 DIAGNOSIS — R05.9 COUGHING: ICD-10-CM

## 2017-03-03 DIAGNOSIS — Z98.1 S/P CERVICAL SPINAL FUSION: ICD-10-CM

## 2017-03-03 DIAGNOSIS — R51.9 SEVERE HEADACHE: ICD-10-CM

## 2017-03-03 DIAGNOSIS — M48.02 CERVICAL SPINAL STENOSIS: ICD-10-CM

## 2017-03-03 RX ORDER — VARENICLINE TARTRATE 0.5 (11)-1
KIT ORAL
Refills: 0 | COMMUNITY
Start: 2017-02-21 | End: 2018-04-23

## 2017-03-03 RX ORDER — ACETAMINOPHEN AND CODEINE PHOSPHATE 300; 30 MG/1; MG/1
TABLET ORAL
Refills: 0 | COMMUNITY
Start: 2016-11-28 | End: 2018-04-23

## 2017-03-03 RX ORDER — METHYLPREDNISOLONE 4 MG/1
TABLET ORAL
Qty: 21 TAB | Refills: 0 | Status: SHIPPED | OUTPATIENT
Start: 2017-03-03 | End: 2018-04-23

## 2017-03-03 RX ORDER — METHYLPREDNISOLONE 4 MG/1
TABLET ORAL
Qty: 21 TAB | Refills: 0 | Status: CANCELLED | OUTPATIENT
Start: 2017-03-03

## 2017-03-03 RX ORDER — AMOXICILLIN 500 MG/1
TABLET, FILM COATED ORAL
Refills: 0 | COMMUNITY
Start: 2016-11-28 | End: 2018-04-23

## 2017-03-03 RX ORDER — HYDROCODONE BITARTRATE AND ACETAMINOPHEN 5; 325 MG/1; MG/1
1 TABLET ORAL
Qty: 90 TAB | Refills: 0 | Status: SHIPPED | OUTPATIENT
Start: 2017-03-03 | End: 2018-04-23

## 2017-03-03 NOTE — PATIENT INSTRUCTIONS
Wound Care: After Your Visit  Your Care Instructions  Taking good care of your wound at home will help it heal quickly and reduce your chance of infection. The doctor has checked you carefully, but problems can develop later. If you notice any problems or new symptoms, get medical treatment right away. Follow-up care is a key part of your treatment and safety. Be sure to make and go to all appointments, and call your doctor if you are having problems. It's also a good idea to know your test results and keep a list of the medicines you take. How can you care for yourself at home? · Clean the area with soap and water 2 times a day unless your doctor gives you different instructions. Don't use hydrogen peroxide or alcohol, which can slow healing. ¨ You may cover the wound with a thin layer of antibiotic ointment, such as bacitracin, and a nonstick bandage. ¨ Apply more ointment and replace the bandage as needed. · Take pain medicines exactly as directed. Some pain is normal with a wound, but do not ignore pain that is getting worse instead of better. You could have an infection. ¨ If the doctor gave you a prescription medicine for pain, take it as prescribed. ¨ If you are not taking a prescription pain medicine, ask your doctor if you can take an over-the-counter medicine. · Your doctor may have closed your wound with stitches (sutures), staples, or skin glue. ¨ If you have stitches, your doctor may remove them after several days to 2 weeks. Or you may have stitches that dissolve on their own. ¨ If you have staples, your doctor may remove them after 7 to 10 days. ¨ If your wound was closed with skin glue, the glue will wear off in a few days to 2 weeks. When should you call for help? Call your doctor now or seek immediate medical care if:  · You have signs of infection, such as:  ¨ Increased pain, swelling, warmth, or redness near the wound. ¨ Red streaks leading from the wound.   ¨ Pus draining from the wound. ¨ A fever. · You bleed so much from your incision that you soak one or more bandages over 2 to 4 hours. Watch closely for changes in your health, and be sure to contact your doctor if:  · The wound is not getting better each day. Where can you learn more? Go to Hatsize.be  Enter M973 in the search box to learn more about \"Wound Care: After Your Visit. \"   © 2736-4250 Healthwise, Incorporated. Care instructions adapted under license by [facility] (which disclaims liability or warranty for this information). This care instruction is for use with your licensed healthcare professional. If you have questions about a medical condition or this instruction, always ask your healthcare professional. [address] any warranty or liability for your use of this information. Content Version: 48.2.097347;  Last Revised: April 23, 2012

## 2017-03-03 NOTE — PROGRESS NOTES
[address]  [address], [address]  [facility], [address]  Phone: [phone]  Fax: [phone]  PROGRESS NOTE  Patient: Suzie Morley                MRN: [id number]       SSN: [id number]  YOB: 1970        AGE: 48 y.o. SEX: female  Body mass index is 35.26 kg/(m^2). PCP: Camacho Chavez MD  17    Chief Complaint   Patient presents with    Back Pain     post op       HISTORY OF PRESENT ILLNESS, RADIOGRAPHS, and PLAN:     HISTORY:  Ms. Ismael Ashley returns today. She just had a cervical fusion earlier this week. I have been in contact with her several nights. I got a message from a physical therapist who saw her after surgery saying she was having copious drainage coming from her drain site hole. I tried calling her that night unsuccessfully and my staff reached her again the following morning saying that the drainage had  down and she was feeling better. She then contacted my office later that afternoon to inform us that she was having some headache with some more drainage. I then had her come in to see me today in the office. Today, in the office, her neck is soft. There is no particular swelling. The incision is well-healed. She is complaining of a headache. She said the headache started about a day or two after the surgery. It has been off and on. It is brought on by coughing. It is not particularly orthostatic in nature. She was having some measure of dysphagia, but the dysphagia has been improving. The drainage from the drain site has apparently resolved at the time of coming to the office. There was a gauze pad there that had the slightest stain on it and I replaced it with a simple Band-Aid. Her vital signs are stable. She is afebrile. PHYSICAL EXAMINATION:  Her exam demonstrates no neurology. She says her arm and neck pain is much improved.   She says she is eating and drinking okay but has to chew and follow carefully, or she feels like she may choke. She has woken up sometimes at night with a cough, and it is the coughing that bothers her the most.      ASSESSMENT/PLAN: From the description of what happened, it would sound to me like the ELLI drain that we had in her neck that had essentially no drainage from it was giving us a false sense of whatever wound drainage there was, because shortly after it was removed, she was having drainage from the drain hole. It sounds like she drained some hematoma from the drain hole subsequently, which is what the physical therapist and the patient reported. That drainage has since subsided or resolved itself. She is having postoperative dysphagia which is not uncommon. What is uncommon is this degree of headache. She had a three level cervical decompression and fusion. There is no evidence of any type of durotomy or dural injury at the time of surgery, and again, her drain appeared to be dry at the time of surgery and thereafter, though I do not know if I can trust that finding. Her headaches do not appear to be orthostatic, though that may not be clear in a cervical situation. The drainage from her neck has resolved. Her swallowing is improving, she says, over the past 24 hours. I recommended that she maintain her hydration and that she should probably use caffeinated beverages in case this is some sort of dural leak. I have offered her hospital admission, but she says she does not feel like that is warranted, and quite honestly, she looks well enough that it is not clear to me it is warranted either. She is neurologically intact with a soft neck with improving symptoms. The only issue she has is this cough. She is afebrile. Her swallowing is improving. I am just going to continue to observe her.   If she has any worsening symptoms, I have told her that I have offered her already to simply come to the emergency room, and I will arrange for admission. Otherwise, I will see her in my next office hours for another check-up. cc: Elysia Rocha M.D. Past Medical History:   Diagnosis Date    Psychiatric disorder     depression       History reviewed. No pertinent family history. Current Outpatient Prescriptions   Medication Sig Dispense Refill    buPROPion SR (WELLBUTRIN SR) 150 mg SR tablet Take 150 mg by mouth two (2) times a day.  acetaminophen-codeine (TYLENOL #3) 300-30 mg per tablet TAKE 1 TABLET BY MOUTH EVERY 3-4 HOURS AS NEEDED FOR PAIN  0    amoxicillin 500 mg tab TAKE 2 TABLETS BY MOUTH AT BEDTIME TONIGHT,THEN TAKE 1 TABLET BY MOUTH EVERY 6 HOURS UNTIL GONE  0    CHANTIX STARTING MONTH BOX 0.5 mg (11)- 1 mg (42) DsPk AS DIRECTED ORALLY  0    oxyCODONE-acetaminophen (PERCOCET 10)  mg per tablet Take 1 Tab by mouth every six (6) hours as needed. Max Daily Amount: 4 Tabs. 40 Tab 0    varenicline (CHANTIX CONTINUING MONTH YVONNE) 1 mg tablet Take 1 mg by mouth two (2) times daily (after meals).  fexofenadine-pseudoephedrine (ALLEGRA-D 12 HOUR)  mg Tb12 Take 1 Tab by mouth every twelve (12) hours.  cyclobenzaprine (FLEXERIL) 10 mg tablet       HYDROcodone-acetaminophen (NORCO) 5-325 mg per tablet       melatonin tab tablet Take 10 mg by mouth nightly.  gabapentin (NEURONTIN) 600 mg tablet Take 1 Tab by mouth four (4) times daily. 120 Tab 1       No Known Allergies    Past Surgical History:   Procedure Laterality Date    HX GYN      hysterectomy    HX GYN      c/s       Past Medical History:   Diagnosis Date    Psychiatric disorder     depression       Social History     Social History    Marital status:      Spouse name: N/A    Number of children: N/A    Years of education: N/A     Occupational History    Not on file.      Social History Main Topics    Smoking status: Current Every Day Smoker     Packs/day: 0.50    Smokeless tobacco: Not on file    Alcohol use Yes      Comment: socially    Drug use: Not on file    Sexual activity: Not on file     Other Topics Concern    Not on file     Social History Narrative       REVIEW OF SYSTEMS:   CONSTITUTIONAL SYMPTOMS:  Negative. EYES:  Negative. EARS, NOSE, THROAT AND MOUTH:  Negative. CARDIOVASCULAR:  Negative. RESPIRATORY:  Negative. GENITOURINARY: Negative. GASTROINTESTINAL:  Negative. INTEGUMENTARY (SKIN AND/OR BREAST):  Negative. MUSCULOSKELETAL: Per HPI.   ENDOCRINE/RHEUMATOLOGIC:  Negative. NEUROLOGICAL:  Per HPI. HEMATOLOGIC/LYMPHATIC:  Negative. ALLERGIC/IMMUNOLOGIC:  Negative. PSYCHIATRIC:  Negative. PHYSICAL EXAMINATION:   Visit Vitals    /66    Pulse 79    Temp 97.6 °F (36.4 °C) (Oral)    Resp 18    Ht 5' 2\" (1.575 m)    Wt 192 lb 12.8 oz (87.5 kg)    SpO2 99%    BMI 35.26 kg/m2    PAIN SCALE: 8/10    CONSTITUTIONAL: The patient is in no apparent distress and is alert and oriented x 3. HEENT: Normocephalic. Hearing grossly intact. NECK: Supple and symmetric. no tenderness, or masses were felt. RESPIRATORY: No labored breathing. CARDIOVASCULAR: The carotid pulses were normal. Peripheral pulses were 2+. CHEST: Normal AP diameter and normal contour without any kyphoscoliosis. LYMPHATIC: No lymphadenopathy was appreciated in the neck, axillae or groin. SKIN:  Drainage improving. Incision healing. Negative for scars, rashes, lesions, or ulcers on the right upper, right lower, left upper, left lower and trunk. NEUROLOGICAL: Alert and oriented x 3. Ambulation without assistive device. FWB. EXTREMITIES: See musculoskeletal.  MUSCULOSKELETAL:   Head and Neck: Headache. Trouble swallowing. Coughing. Negative for misalignment, asymmetry, crepitation, defects, tenderness masses or effusions.  Left Upper Extremity: Inspection, percussion and palpation preformed. Boyles sign is negative.  Right Upper Extremity: Inspection, percussion and palpation preformed.    Boyles sign is negative.  Spine, Ribs and Pelvis: Inspection, percussion and palpation preformed. Negative for misalignment, asymmetry, crepitation, defects, tenderness masses or effusions.  Left Lower Extremity: Inspection, percussion and palpation preformed. Negative straight leg raise.  Right Lower Extremity: Inspection, percussion and palpation preformed. Negative straight leg raise. SPINE EXAM:     Cervical spine: Neck is midline. Normal muscle tone. No focal atrophy is noted. ASSESSMENT    ICD-10-CM ICD-9-CM    1. S/P cervical spinal fusion Z98.1 V45.4    2. Cervical spinal stenosis M48.02 723.0    3.  Severe headache R51 784.0    4. Coughing R05 786.2        Written by Tea León, as dictated by Syd Osorio MD.

## 2017-03-03 NOTE — MR AVS SNAPSHOT
Visit Information Date & Time Provider Department Dept. Phone Encounter #  
 3/3/2017 10:10 AM Clay Beltre MD [facility] [phone] [id number] Follow-up Instructions Return in about 4 days (around 3/7/2017). Follow-up and Disposition History Your Appointments 3/13/2017  9:50 AM  
POST OP with Leesa Nipple, NP  
[facility] ([address] [place] [address]) Appt Note: surg 2-27/ ACDF c4/5, c5/6  
 [address] [facility] [address]  
[phone]  
  
   
 [address] [place] [address] 4/11/2017  8:00 AM  
POST OP with Clay Beltre MD  
[facility] ([address] [place] [address]) Appt Note: 6wk//surg acdf c4/5, c5/6  
 [address] [place] [address]  
[phone]  
  
   
 [address] [place] [address] Upcoming Health Maintenance Date Due Pneumococcal 19-64 Medium Risk (1 of 1 - PPSV23) 12/12/1987 DTaP/Tdap/Td series (1 - Tdap) 12/12/1989 PAP AKA CERVICAL CYTOLOGY 12/12/1989 INFLUENZA AGE 9 TO ADULT 8/1/2016 Allergies as of 3/3/2017  Review Complete On: 3/3/2017 By: Janeal Perches No Known Allergies Current Immunizations  Never Reviewed No immunizations on file. Not reviewed this visit You Were Diagnosed With   
  
 Codes Comments S/P cervical spinal fusion     ICD-10-CM: Z98.1 ICD-9-CM: V45.4 Cervical spinal stenosis     ICD-10-CM: M48.02 
ICD-9-CM: 723.0 Severe headache     ICD-10-CM: R51 ICD-9-CM: 784.0 Coughing     ICD-10-CM: O27 ICD-9-CM: 382. 2 Vitals BP  
  
  
  
  
  
 132/66 BMI and BSA Data Body Mass Index Body Surface Area  
 35.26 kg/m 2 1.96 m 2 Preferred Pharmacy Pharmacy Name Phone [id number], [facility],[address] [place] [phone] Your Updated Medication List  
  
   
This list is accurate as of: 3/3/17 10:43 AM.  Always use your most recent med list.  
  
  
  
  
 acetaminophen-codeine 300-30 mg per tablet Commonly known as:  TYLENOL #3  
TAKE 1 TABLET BY MOUTH EVERY 3-4 HOURS AS NEEDED FOR PAIN  
  
 ALLEGRA-D 12 HOUR  mg Tb12 Generic drug:  fexofenadine-pseudoephedrine Take 1 Tab by mouth every twelve (12) hours. amoxicillin 500 mg Tab TAKE 2 TABLETS BY MOUTH AT BEDTIME TONIGHT,THEN TAKE 1 TABLET BY MOUTH EVERY 6 HOURS UNTIL GONE  
  
 * CHANTIX CONTINUING MONTH YVONNE 1 mg tablet Generic drug:  varenicline Take 1 mg by mouth two (2) times daily (after meals). * CHANTIX STARTING MONTH BOX 0.5 mg (11)- 1 mg (42) Dspk Generic drug:  varenicline AS DIRECTED ORALLY  
  
 cyclobenzaprine 10 mg tablet Commonly known as:  FLEXERIL  
  
 gabapentin 600 mg tablet Commonly known as:  NEURONTIN Take 1 Tab by mouth four (4) times daily. * HYDROcodone-acetaminophen 5-325 mg per tablet Commonly known as:  NORCO  
  
 * HYDROcodone-acetaminophen 5-325 mg per tablet Commonly known as:  Benetta Pock Take 1 Tab by mouth every eight (8) hours as needed for Pain. Max Daily Amount: 3 Tabs. melatonin Tab tablet Take 10 mg by mouth nightly. methylPREDNISolone 4 mg tablet Commonly known as:  Judithe Penny Per dose pack instructions  
  
 oxyCODONE-acetaminophen  mg per tablet Commonly known as:  PERCOCET 10 Take 1 Tab by mouth every six (6) hours as needed. Max Daily Amount: 4 Tabs. WELLBUTRIN  mg SR tablet Generic drug:  buPROPion SR Take 150 mg by mouth two (2) times a day. * Notice: This list has 4 medication(s) that are the same as other medications prescribed for you. Read the directions carefully, and ask your doctor or other care provider to review them with you. Prescriptions Printed Refills HYDROcodone-acetaminophen (NORCO) 5-325 mg per tablet 0 Sig: Take 1 Tab by mouth every eight (8) hours as needed for Pain. Max Daily Amount: 3 Tabs. Class: Print Route: Oral  
  
Prescriptions Sent to Pharmacy Refills  
 methylPREDNISolone (MEDROL DOSEPACK) 4 mg tablet 0 Sig: Per dose pack instructions Class: Normal  
 Pharmacy: [facility]/pharmacy [address] [place] [address], [facility],[address] #: [phone] Follow-up Instructions Return in about 4 days (around 3/7/2017). To-Do List   
 03/03/2017 3:30 PM  
  Appointment with Jorden Staples, SLP at [facility] Patient Instructions Wound Care: After Your Visit Your Care Instructions Taking good care of your wound at home will help it heal quickly and reduce your chance of infection. The doctor has checked you carefully, but problems can develop later. If you notice any problems or new symptoms, get medical treatment right away. Follow-up care is a key part of your treatment and safety. Be sure to make and go to all appointments, and call your doctor if you are having problems. It's also a good idea to know your test results and keep a list of the medicines you take. How can you care for yourself at home? · Clean the area with soap and water 2 times a day unless your doctor gives you different instructions. Don't use hydrogen peroxide or alcohol, which can slow healing. ¨ You may cover the wound with a thin layer of antibiotic ointment, such as bacitracin, and a nonstick bandage. ¨ Apply more ointment and replace the bandage as needed. · Take pain medicines exactly as directed. Some pain is normal with a wound, but do not ignore pain that is getting worse instead of better. You could have an infection. ¨ If the doctor gave you a prescription medicine for pain, take it as prescribed.  
¨ If you are not taking a prescription pain medicine, ask your doctor if you can take an over-the-counter medicine. · Your doctor may have closed your wound with stitches (sutures), staples, or skin glue. ¨ If you have stitches, your doctor may remove them after several days to 2 weeks. Or you may have stitches that dissolve on their own. ¨ If you have staples, your doctor may remove them after 7 to 10 days. ¨ If your wound was closed with skin glue, the glue will wear off in a few days to 2 weeks. When should you call for help? Call your doctor now or seek immediate medical care if: 
· You have signs of infection, such as: 
¨ Increased pain, swelling, warmth, or redness near the wound. ¨ Red streaks leading from the wound. ¨ Pus draining from the wound. ¨ A fever. · You bleed so much from your incision that you soak one or more bandages over 2 to 4 hours. Watch closely for changes in your health, and be sure to contact your doctor if: · The wound is not getting better each day. Where can you learn more? Go to [employer].be Enter G140 in the search box to learn more about \"Wound Care: After Your Visit. \"  
© 4054-1732 Healthwise, Incorporated. Care instructions adapted under license by [facility] (which disclaims liability or warranty for this information). This care instruction is for use with your licensed healthcare professional. If you have questions about a medical condition or this instruction, always ask your healthcare professional. [place] [address] any warranty or liability for your use of this information. Content Version: 00.8.036831; Last Revised: April 23, 2012 Patient Instructions History Introducing [facility]! Dear Anne Marie Hong: Thank you for requesting a [facility] account. Our records indicate that you already have an active [facility] account. You can access your account anytime at [email or web address] Did you know that you can access your hospital and ER discharge instructions at any time in citizenmade? You can also review all of your test results from your hospital stay or ER visit. Additional Information If you have questions, please visit the Frequently Asked Questions section of the citizenmade website at [email or web address] Remember, citizenmade is NOT to be used for urgent needs. For medical emergencies, dial 911. Now available from your iPhone and Android! Please provide this summary of care documentation to your next provider. Your primary care clinician is listed as Dharmesh Khanna. If you have any questions after today's visit, please call [phone].

## 2017-03-05 ENCOUNTER — HOME CARE VISIT (OUTPATIENT)
Dept: SCHEDULING | Facility: HOME HEALTH | Age: 47
End: 2017-03-05
Payer: COMMERCIAL

## 2017-03-06 ENCOUNTER — HOME CARE VISIT (OUTPATIENT)
Dept: SCHEDULING | Facility: HOME HEALTH | Age: 47
End: 2017-03-06
Payer: COMMERCIAL

## 2017-03-06 NOTE — DISCHARGE SUMMARY
Discharge  Summary     Patient: Janel Villafuerte MRN: [id number]  SSN: [id number]    YOB: 1970  Age: 48 y.o. Sex: female       Admit Date: 2/27/2017    Discharge Date:  2/28/2017      Admission Diagnoses: HNP (herniated nucleus pulposus), cervical [M50.20]  Cervical spinal stenosis [M48.02]    Discharge Diagnoses:   Problem List as of 2/28/2017  Date Reviewed: 2/26/2017          Codes Class Noted - Resolved    Cervical radiculopathy ICD-10-CM: M54.12  ICD-9-CM: 723.4  8/30/2016 - Present        Cervical spinal stenosis ICD-10-CM: M48.02  ICD-9-CM: 723.0  8/30/2016 - Present        HNP (herniated nucleus pulposus), cervical ICD-10-CM: M50.20  ICD-9-CM: 722.0  8/30/2016 - Present               Discharge Condition: Good    Procedure: 1. Anterior cervical decompression and fusion C4-5, C5-6, C6-7.  2. Structural allograft x3 cervical plate fixation J6-6, C5-6, C6-C7 with  NuVasive locking plate and screws.       Hospital Course: Normal hospital course for this procedure. Tolerated surgical intervention well. Incision dry and intact. Ambulatory. Swallowing okay        Disposition: home    Discharge Medications:      Current Discharge Medication List      START taking these medications       Dose & Instructions Dispensing Information Comments   Morning Noon Evening Bedtime    oxyCODONE-acetaminophen  mg per tablet   Commonly known as:  PERCOCET 10       Your next dose is: Today, Tomorrow       Other:  _________            Dose:  1 Tab   Take 1 Tab by mouth every six (6) hours as needed. Max Daily Amount: 4 Tabs. Quantity:  40 Tab   Refills:  0                           CONTINUE these medications which have NOT CHANGED       Dose & Instructions Dispensing Information Comments   Morning Noon Evening Bedtime    ALLEGRA-D 12 HOUR  mg Tb12   Generic drug:  fexofenadine-pseudoephedrine       Your next dose is:   Today, Tomorrow       Other:  _________            Dose:  1 Tab   Take 1 Tab by mouth every twelve (12) hours. Refills:  0                         CHANTIX CONTINUING MONTH YVONNE 1 mg tablet   Generic drug:  varenicline       Your next dose is: Today, Tomorrow       Other:  _________            Dose:  1 mg   Take 1 mg by mouth two (2) times daily (after meals). Refills:  0                         cyclobenzaprine 10 mg tablet   Commonly known as:  FLEXERIL       Your next dose is: Today, Tomorrow       Other:  _________             Refills:  0                         gabapentin 600 mg tablet   Commonly known as:  NEURONTIN       Your next dose is: Today, Tomorrow       Other:  _________            Dose:  600 mg   Take 1 Tab by mouth four (4) times daily. Quantity:  120 Tab   Refills:  1                         HYDROcodone-acetaminophen 5-325 mg per tablet   Commonly known as:  Mey Gut       Your next dose is: Today, Tomorrow       Other:  _________             Refills:  0                         melatonin Tab tablet       Your next dose is: Today, Tomorrow       Other:  _________            Dose:  10 mg   Take 10 mg by mouth nightly. Refills:  0                         WELLBUTRIN  mg SR tablet   Generic drug:  buPROPion SR       Your next dose is: Today, Tomorrow       Other:  _________            Dose:  150 mg   Take 150 mg by mouth two (2) times a day. Refills:  0                              Where to Get Your Medications      Information on where to get these meds will be given to you by the nurse or doctor. ! Ask your nurse or doctor about these medications     oxyCODONE-acetaminophen  mg per tablet               Follow-up Appointments   Procedures    FOLLOW UP VISIT Appointment in: Two Weeks     Standing Status:   Standing     Number of Occurrences:   1     Order Specific Question:   Appointment in     Answer:    Two Weeks       Signed By: Fernando Ritchie NP     March 6, 2017

## 2017-03-07 ENCOUNTER — OFFICE VISIT (OUTPATIENT)
Dept: ORTHOPEDIC SURGERY | Age: 47
End: 2017-03-07

## 2017-03-07 VITALS
SYSTOLIC BLOOD PRESSURE: 130 MMHG | RESPIRATION RATE: 18 BRPM | WEIGHT: 192 LBS | TEMPERATURE: 97.7 F | BODY MASS INDEX: 35.33 KG/M2 | DIASTOLIC BLOOD PRESSURE: 74 MMHG | HEART RATE: 74 BPM | HEIGHT: 62 IN

## 2017-03-07 DIAGNOSIS — M48.02 CERVICAL SPINAL STENOSIS: Primary | ICD-10-CM

## 2017-03-07 DIAGNOSIS — Z98.1 S/P CERVICAL SPINAL FUSION: ICD-10-CM

## 2017-03-07 NOTE — PROGRESS NOTES
[address]  [address], [address]  [place]  Phone: [phone]  Fax: [phone]  PROGRESS NOTE  Patient: Victor Hugo Brasher                MRN: [id number]       SSN: [id number]  YOB: 1970        AGE: 48 y.o. SEX: female  Body mass index is 35.12 kg/(m^2). PCP: Chrissy Peres MD  03/07/17    Chief Complaint   Patient presents with    Neck Pain     1 week follow up       [facility], RADIOGRAPHS, and PLAN:     HISTORY:  Ms. Valentina Gallo returns today. She is one week out from surgery. Things have finally settled down. She is eating and drinking normally. She still gets a headache if she coughs or sneezes, and then it resolves itself, but it will happen all day. Her arm and hands are better. She still has some residual numbness in her right hand. Overall, she feels much better and rates her symptoms as a 3/10. I will see her again next week as scheduled. cc: Erik Henderson M.D. Past Medical History:   Diagnosis Date    Psychiatric disorder     depression       No family history on file. Current Outpatient Prescriptions   Medication Sig Dispense Refill    CHANTIX STARTING MONTH BOX 0.5 mg (11)- 1 mg (42) DsPk AS DIRECTED ORALLY  0    HYDROcodone-acetaminophen (NORCO) 5-325 mg per tablet Take 1 Tab by mouth every eight (8) hours as needed for Pain. Max Daily Amount: 3 Tabs. 90 Tab 0    varenicline (CHANTIX CONTINUING MONTH YVONNE) 1 mg tablet Take 1 mg by mouth two (2) times daily (after meals).  fexofenadine-pseudoephedrine (ALLEGRA-D 12 HOUR)  mg Tb12 Take 1 Tab by mouth every twelve (12) hours.  HYDROcodone-acetaminophen (NORCO) 5-325 mg per tablet       melatonin tab tablet Take 10 mg by mouth nightly.  gabapentin (NEURONTIN) 600 mg tablet Take 1 Tab by mouth four (4) times daily.  120 Tab 1    buPROPion SR (WELLBUTRIN SR) 150 mg SR tablet Take 150 mg by mouth two (2) times a day.  acetaminophen-codeine (TYLENOL #3) 300-30 mg per tablet TAKE 1 TABLET BY MOUTH EVERY 3-4 HOURS AS NEEDED FOR PAIN  0    amoxicillin 500 mg tab TAKE 2 TABLETS BY MOUTH AT BEDTIME TONIGHT,THEN TAKE 1 TABLET BY MOUTH EVERY 6 HOURS UNTIL GONE  0    methylPREDNISolone (MEDROL DOSEPACK) 4 mg tablet Per dose pack instructions 21 Tab 0    oxyCODONE-acetaminophen (PERCOCET 10)  mg per tablet Take 1 Tab by mouth every six (6) hours as needed. Max Daily Amount: 4 Tabs. (Patient taking differently: Take 1 Tab by mouth every eight (8) hours as needed for Pain.) 40 Tab 0    cyclobenzaprine (FLEXERIL) 10 mg tablet          No Known Allergies    Past Surgical History:   Procedure Laterality Date    HX GYN      hysterectomy    HX GYN      c/s       Past Medical History:   Diagnosis Date    Psychiatric disorder     depression       Social History     Social History    Marital status:      Spouse name: N/A    Number of children: N/A    Years of education: N/A     Occupational History    Not on file. Social History Main Topics    Smoking status: Current Every Day Smoker     Packs/day: 0.50    Smokeless tobacco: Not on file    Alcohol use Yes      Comment: socially    Drug use: Not on file    Sexual activity: Not on file     Other Topics Concern    Not on file     Social History Narrative     REVIEW OF SYSTEMS:   CONSTITUTIONAL SYMPTOMS:  Negative. EYES:  Negative. EARS, NOSE, THROAT AND MOUTH:  Negative. CARDIOVASCULAR:  Negative. RESPIRATORY:  Negative. GENITOURINARY: Negative. GASTROINTESTINAL:  Negative. INTEGUMENTARY (SKIN AND/OR BREAST):  Negative. MUSCULOSKELETAL: Per HPI.   ENDOCRINE/RHEUMATOLOGIC:  Negative. NEUROLOGICAL:  Per HPI. HEMATOLOGIC/LYMPHATIC:  Negative. ALLERGIC/IMMUNOLOGIC:  Negative. PSYCHIATRIC:  Negative.     PHYSICAL EXAMINATION:   Visit Vitals    /74    Pulse 74    Temp 97.7 °F (36.5 °C) (Oral)    Resp 18    Ht 5' 2\" (1.575 m)    Wt 192 lb (87.1 kg)    BMI 35.12 kg/m2    PAIN SCALE: 3/10    CONSTITUTIONAL: The patient is in no apparent distress and is alert and oriented x 3. HEENT: Normocephalic. Hearing grossly intact. NECK: Supple and symmetric. no tenderness, or masses were felt. RESPIRATORY: No labored breathing. CARDIOVASCULAR: The carotid pulses were normal. Peripheral pulses were 2+. CHEST: Normal AP diameter and normal contour without any kyphoscoliosis. LYMPHATIC: No lymphadenopathy was appreciated in the neck, axillae or groin. SKIN:  Incision healing well, no drainage, no erythema, no hernia, no seroma, no swelling, no dehiscence, incision well approximated. Negative for scars, rashes, lesions, or ulcers on the right upper, right lower, left upper, left lower and trunk. NEUROLOGICAL: Alert and oriented x 3. Ambulation without assistive device. FWB. EXTREMITIES: See musculoskeletal.  MUSCULOSKELETAL:   Head and Neck:  Negative for misalignment, asymmetry, crepitation, defects, tenderness masses or effusions.  Left Upper Extremity: Inspection, percussion and palpation preformed. Boyles sign is negative.  Right Upper Extremity: Ache. Numbness. Inspection, percussion and palpation preformed. Boyles sign is negative.  Spine, Ribs and Pelvis: Inspection, percussion and palpation preformed. Negative for misalignment, asymmetry, crepitation, defects, tenderness masses or effusions.  Left Lower Extremity: Inspection, percussion and palpation preformed. Negative straight leg raise.  Right Lower Extremity: Inspection, percussion and palpation preformed. Negative straight leg raise. SPINE EXAM:     Cervical spine: Neck is midline. Normal muscle tone. No focal atrophy is noted. ASSESSMENT    ICD-10-CM ICD-9-CM    1. Cervical spinal stenosis M48.02 723.0    2.  S/P cervical spinal fusion Z98.1 V45.4        Written by Karyn Stephenson, as dictated by Gisella Youngblood MD.

## 2017-03-07 NOTE — PATIENT INSTRUCTIONS
Neck Pain: Care Instructions  Your Care Instructions  You can have neck pain anywhere from the bottom of your head to the top of your shoulders. It can spread to the upper back or arms. Injuries, painting a ceiling, sleeping with your neck twisted, staying in one position for too long, and many other activities can cause neck pain. Most neck pain gets better with home care. Your doctor may recommend medicine to relieve pain or relax your muscles. He or she may suggest exercise and physical therapy to increase flexibility and relieve stress. You may need to wear a special (cervical) collar to support your neck for a day or two. Follow-up care is a key part of your treatment and safety. Be sure to make and go to all appointments, and call your doctor if you are having problems. It's also a good idea to know your test results and keep a list of the medicines you take. How can you care for yourself at home? · Try using a heating pad on a low or medium setting for 15 to 20 minutes every 2 or 3 hours. Try a warm shower in place of one session with the heating pad. · You can also try an ice pack for 10 to 15 minutes every 2 to 3 hours. Put a thin cloth between the ice and your skin. · Take pain medicines exactly as directed. ¨ If the doctor gave you a prescription medicine for pain, take it as prescribed. ¨ If you are not taking a prescription pain medicine, ask your doctor if you can take an over-the-counter medicine. · If your doctor recommends a cervical collar, wear it exactly as directed. When should you call for help? Call your doctor now or seek immediate medical care if:  · You have new or worsening numbness in your arms, buttocks or legs. · You have new or worsening weakness in your arms or legs. (This could make it hard to stand up.)  · You lose control of your bladder or bowels.   Watch closely for changes in your health, and be sure to contact your doctor if:  · Your neck pain is getting worse.  · You are not getting better after 1 week. · You do not get better as expected. Where can you learn more? Go to http://jose martin-karrie.info/. Enter [id number] in the search box to learn more about \"Neck Pain: Care Instructions. \"  Current as of: May 23, 2016  Content Version: 11.1  © 5486-5951 [employer] Care instructions adapted under license by [employer] (which disclaims liability or warranty for this information). If you have questions about a medical condition or this instruction, always ask your healthcare professional. [place] [address] any warranty or liability for your use of this information.

## 2017-03-09 ENCOUNTER — HOME CARE VISIT (OUTPATIENT)
Dept: SCHEDULING | Facility: HOME HEALTH | Age: 47
End: 2017-03-09
Payer: COMMERCIAL

## 2017-03-09 VITALS
SYSTOLIC BLOOD PRESSURE: 118 MMHG | DIASTOLIC BLOOD PRESSURE: 62 MMHG | TEMPERATURE: 97.3 F | OXYGEN SATURATION: 98 % | HEART RATE: 76 BPM

## 2017-03-09 PROCEDURE — G0151 HHCP-SERV OF PT,EA 15 MIN: HCPCS

## 2017-03-13 ENCOUNTER — OFFICE VISIT (OUTPATIENT)
Dept: ORTHOPEDIC SURGERY | Age: 47
End: 2017-03-13

## 2017-03-13 VITALS
SYSTOLIC BLOOD PRESSURE: 114 MMHG | BODY MASS INDEX: 34.67 KG/M2 | RESPIRATION RATE: 20 BRPM | HEART RATE: 88 BPM | DIASTOLIC BLOOD PRESSURE: 68 MMHG | WEIGHT: 188.4 LBS | HEIGHT: 62 IN | TEMPERATURE: 98.1 F | OXYGEN SATURATION: 97 %

## 2017-03-13 DIAGNOSIS — Z98.1 S/P CERVICAL SPINAL FUSION: Primary | ICD-10-CM

## 2017-03-13 NOTE — PROGRESS NOTES
Chief complaint/History of Present Illness:  Chief Complaint   Patient presents with    Neck Pain     Post op     HPI  Scot Amaral is a  48 y.o.  female      HISTORY OF PRESENT ILLNESS:  The patient comes in today two weeks status post her ACDF of C4-5 and C5-6 on February 27, 2017. She was seen twice last week by Dr. Ko Carlos. She was having difficulty swallowing and pain. He gave her a Medrol Dosepak. She states that did help significantly. She has finished that up now. She is swallowing okay. She states she has had relief of her right arm pain. It is much improved, and the weakness is also improved. Her left arm weakness is 80% stronger. She feels like the surgery was successful. She is only smoking an occasional cigarette now and then. She was a one-pack-a-day smoker and was put on Chantix before surgery by her PCP. Soon, she will be able to quite altogether. She denies fever or bowel or bladder dysfunction. PHYSICAL EXAM:  Ms. Adelaida Mckenna is a 49-year-old female. She is alert and oriented. Affect is normal.  She has a full weight bearing, non-antalgic gait, normal tandem gait, negative Boyles. She has 4/5 strength of the bilateral upper extremities. Her anterior surgical incision is healing nicely. The edge is well approximated. There is no erythema, warmth, drainage, or signs of infection. There is a very small hematoma just under the incision, but it is soft. ASSESSENT/PLAN:  This is a patient two weeks out from her ACDF at C4-5 and C5-6 on February 27, 2017. I am pleased that her swallowing difficulties have improved, that the Medrol Dosepak has helped her. She is taking the Norco 5/325 mg for posterior neck pain. Hopefully, she will be able to wean off of that eventually. Her voice is a little bit raspy. That should improve also. We will see the patient back with me in four weeks, as Dr. Ko Carlos will be on vacation at that time.   We will get a cervical AP and lateral x-ray. We will see her back at that time. Review of systems:    Past Medical History:   Diagnosis Date    Psychiatric disorder     depression     Past Surgical History:   Procedure Laterality Date    HX GYN      hysterectomy    HX GYN      c/s     Social History     Social History    Marital status:      Spouse name: N/A    Number of children: N/A    Years of education: N/A     Occupational History    Not on file. Social History Main Topics    Smoking status: Current Every Day Smoker     Packs/day: 0.50    Smokeless tobacco: Not on file    Alcohol use Yes      Comment: socially    Drug use: Not on file    Sexual activity: Not on file     Other Topics Concern    Not on file     Social History Narrative     No family history on file. Physical Exam:  Visit Vitals    /68    Pulse 88    Temp 98.1 °F (36.7 °C) (Oral)    Resp 20    Ht 5' 2\" (1.575 m)    Wt 188 lb 6.4 oz (85.5 kg)    SpO2 97%    BMI 34.46 kg/m2     Pain Scale: 2/10     has been . reviewed and is appropriate      Maryse Orr was seen today for neck pain. Diagnoses and all orders for this visit:    S/P cervical spinal fusion            Follow-up Disposition:  Return in about 4 weeks (around 4/10/2017) for with Valentin Gleason on 4/11/17.         We have informed Beverly Santana to notify us for immediate appointment if she has any worsening neurogical symptoms or if an emergency situation presents, then call [phone]

## 2017-03-13 NOTE — MR AVS SNAPSHOT
Visit Information Date & Time Provider Department Dept. Phone Encounter #  
 3/13/2017  9:50 AM Mei Dolan NP [facility] [phone] [id number] Follow-up Instructions Return in about 4 weeks (around 4/10/2017) for with Zach Rene on 4/11/17. Follow-up and Disposition History Your Appointments 4/11/2017  8:30 AM  
POST OP with Mei Dolan NP  
[facility] Appt Note: 6wk//surg acdf c4/5, c5/6; R/S FROM [facility] OUT  PT AWARE OF TIME CHANGE  
 [address] [facility] [address]  
[phone]  
  
   
 [address] [facility] [address] Upcoming Health Maintenance Date Due Pneumococcal 19-64 Medium Risk (1 of 1 - PPSV23) 12/12/1987 DTaP/Tdap/Td series (1 - Tdap) 12/12/1989 PAP AKA CERVICAL CYTOLOGY 12/12/1989 INFLUENZA AGE 9 TO ADULT 8/1/2016 Allergies as of 3/13/2017  Review Complete On: 3/13/2017 By: Mei Dolan NP No Known Allergies Current Immunizations  Never Reviewed No immunizations on file. Not reviewed this visit You Were Diagnosed With   
  
 Codes Comments S/P cervical spinal fusion    -  Primary ICD-10-CM: Z98.1 ICD-9-CM: V45.4 Vitals BP Pulse Temp Resp Height(growth percentile) Weight(growth percentile) 114/68 88 98.1 °F (36.7 °C) (Oral) 20 5' 2\" (1.575 m) 188 lb 6.4 oz (85.5 kg) SpO2 BMI OB Status Smoking Status 97% 34.46 kg/m2 Hysterectomy Current Every Day Smoker BMI and BSA Data Body Mass Index Body Surface Area 34.46 kg/m 2 1.93 m 2 Preferred Pharmacy Pharmacy Name Phone [id number] [place] [id number] [facility] [facility] [phone] Your Updated Medication List  
  
   
This list is accurate as of: 3/13/17 10:11 AM.  Always use your most recent med list.  
  
  
  
  
 acetaminophen-codeine 300-30 mg per tablet Commonly known as:  TYLENOL #3  
TAKE 1 TABLET BY MOUTH EVERY 3-4 HOURS AS NEEDED FOR PAIN  
  
 ALLEGRA-D 12 HOUR  mg Tb12 Generic drug:  fexofenadine-pseudoephedrine Take 1 Tab by mouth every twelve (12) hours. amoxicillin 500 mg Tab TAKE 2 TABLETS BY MOUTH AT BEDTIME TONIGHT,THEN TAKE 1 TABLET BY MOUTH EVERY 6 HOURS UNTIL GONE  
  
 * CHANTIX CONTINUING MONTH YVONNE 1 mg tablet Generic drug:  varenicline Take 1 mg by mouth two (2) times daily (after meals). * CHANTIX STARTING MONTH BOX 0.5 mg (11)- 1 mg (42) Dspk Generic drug:  varenicline AS DIRECTED ORALLY  
  
 cyclobenzaprine 10 mg tablet Commonly known as:  FLEXERIL  
  
 gabapentin 600 mg tablet Commonly known as:  NEURONTIN Take 1 Tab by mouth four (4) times daily. * HYDROcodone-acetaminophen 5-325 mg per tablet Commonly known as:  NORCO  
  
 * HYDROcodone-acetaminophen 5-325 mg per tablet Commonly known as:  Barnet Cuff Take 1 Tab by mouth every eight (8) hours as needed for Pain. Max Daily Amount: 3 Tabs. melatonin Tab tablet Take 10 mg by mouth nightly. methylPREDNISolone 4 mg tablet Commonly known as:  Danella Shila Per dose pack instructions  
  
 oxyCODONE-acetaminophen  mg per tablet Commonly known as:  PERCOCET 10 Take 1 Tab by mouth every six (6) hours as needed. Max Daily Amount: 4 Tabs. WELLBUTRIN  mg SR tablet Generic drug:  buPROPion SR Take 150 mg by mouth two (2) times a day. * Notice: This list has 4 medication(s) that are the same as other medications prescribed for you. Read the directions carefully, and ask your doctor or other care provider to review them with you. Follow-up Instructions Return in about 4 weeks (around 4/10/2017) for with Deisy Oewns on 4/11/17. Introducing [facility]! Dear Nicola Thompson: Thank you for requesting a [email or web address] account.   Our records indicate that you already have an active Pzoom account. You can access your account anytime at [email or web address] Did you know that you can access your hospital and ER discharge instructions at any time in Pzoom? You can also review all of your test results from your hospital stay or ER visit. Additional Information If you have questions, please visit the Frequently Asked Questions section of the Pzoom website at [email or web address] Remember, Pzoom is NOT to be used for urgent needs. For medical emergencies, dial 911. Now available from your iPhone and Android! Please provide this summary of care documentation to your next provider. Your primary care clinician is listed as Dharmesh Khanna. If you have any questions after today's visit, please call [phone].

## 2017-04-11 ENCOUNTER — OFFICE VISIT (OUTPATIENT)
Dept: ORTHOPEDIC SURGERY | Age: 47
End: 2017-04-11

## 2017-04-11 VITALS
RESPIRATION RATE: 18 BRPM | TEMPERATURE: 98 F | WEIGHT: 188 LBS | BODY MASS INDEX: 34.6 KG/M2 | SYSTOLIC BLOOD PRESSURE: 126 MMHG | HEART RATE: 85 BPM | HEIGHT: 62 IN | OXYGEN SATURATION: 97 % | DIASTOLIC BLOOD PRESSURE: 73 MMHG

## 2017-04-11 DIAGNOSIS — M54.12 CERVICAL RADICULOPATHY: ICD-10-CM

## 2017-04-11 DIAGNOSIS — Z98.1 S/P CERVICAL SPINAL FUSION: Primary | ICD-10-CM

## 2017-04-11 DIAGNOSIS — M79.18 MYOFASCIAL PAIN: ICD-10-CM

## 2017-04-11 RX ORDER — CYCLOBENZAPRINE HCL 10 MG
10 TABLET ORAL
Qty: 30 TAB | Refills: 2 | Status: SHIPPED | OUTPATIENT
Start: 2017-04-11 | End: 2017-06-21 | Stop reason: SDUPTHER

## 2017-04-11 RX ORDER — DOXYCYCLINE HYCLATE 100 MG
TABLET ORAL
Refills: 0 | COMMUNITY
Start: 2017-03-15 | End: 2018-04-23

## 2017-04-11 NOTE — PROGRESS NOTES
Chief complaint/History of Present Illness:  Chief Complaint   Patient presents with    Neck Pain     post op     HPI  Dorita Mayfield is a  48 y.o.  female      HISTORY OF PRESENT ILLNESS:  The patient comes in today six weeks status post her ACDF at C4-5 and C5-6. She is doing well. Her right arm pain has resolved. She is swallowing okay. Sometimes, she still feels a globus sensation and has a hard time swallowing her large vitamins. She still has a raspy voice. She works as a teachers assistant. She would like to return back to work on Monday. She is smoking an occasional cigarette. She denies fever or bowel or bladder dysfunction. She does complain of some achiness in her trapezius muscles that go to the upper arm. It is a throbbing ache, right greater than left, especially at night. PHYSICAL EXAM:  Ms. Vivek Gomez is a 40-year-old female. She is alert and oriented. She has a normal mood and affect. She has 4/5 strength of the bilateral upper extremities and negative Boyles. Her anterior cervical incision is healing nicely without any signs of infection. It is well approximated. ASSESSENT/PLAN:  This is a patient six weeks out from her ACDF at C4-5 and C5-6. We did a cervical AP and lateral x-ray. These will be read by Dr. Naya Garner. We did speak to her about a referral to ENT for the raspiness in her voice. She is going to give it a little more time. If she decides she wants to do that, she will call us and we will get that referral in. We are going to start her on Flexeril just 10 mg at night. She is only taking one Norco at night. We are going to have her come off of that and use the Flexeril instead. She can also use heat and massage. We will return her back to work on Monday with no lifting greater than 25 pounds and limited overhead reaching. We will see her back in six weeks or sooner if needed.        Review of systems:    Past Medical History:   Diagnosis Date    Psychiatric disorder     depression     Past Surgical History:   Procedure Laterality Date    HX GYN      hysterectomy    HX GYN      c/s     Social History     Social History    Marital status:      Spouse name: N/A    Number of children: N/A    Years of education: N/A     Occupational History    Not on file. Social History Main Topics    Smoking status: Current Every Day Smoker     Packs/day: 0.50    Smokeless tobacco: Not on file    Alcohol use Yes      Comment: socially    Drug use: Not on file    Sexual activity: Not on file     Other Topics Concern    Not on file     Social History Narrative     History reviewed. No pertinent family history. Physical Exam:  Visit Vitals    /73    Pulse 85    Temp 98 °F (36.7 °C) (Oral)    Resp 18    Ht 5' 2\" (1.575 m)    Wt 188 lb (85.3 kg)    SpO2 97%    BMI 34.39 kg/m2     Pain Scale: 0 - No pain/10     has been . reviewed and is appropriate        Henry Castellanos was seen today for neck pain. Diagnoses and all orders for this visit:    S/P cervical spinal fusion  -     AMB POC XRAY, SPINE, CERVICAL; 2 OR 3    Myofascial pain    Other orders  -     cyclobenzaprine (FLEXERIL) 10 mg tablet; Take 1 Tab by mouth nightly as needed for Muscle Spasm(s). Follow-up Disposition:  Return in about 6 weeks (around 5/23/2017) for with Elvin Siemens.         We have informed Camille Waltersrows to notify us for immediate appointment if she has any worsening neurogical symptoms or if an emergency situation presents, then call 911

## 2017-04-11 NOTE — PATIENT INSTRUCTIONS
Cervical Spinal Fusion: What to Expect at Home  Your Recovery    After surgery, you can expect your neck to feel stiff and sore. This should improve in the weeks after surgery. You may have trouble sitting or standing in one position for very long and may need pain medicine in the weeks after your surgery. You may need to wear a neck brace for a while. It may take 4 to 6 weeks to get back to your usual activities, but it may depend on what kind of surgery you had. Your doctor may advise you to work with a physical therapist to strengthen the muscles around your neck and back. This care sheet gives you a general idea about how long it will take for you to recover. But each person recovers at a different pace. Follow the steps below to get better as quickly as possible. How can you care for yourself at home? Activity  · Rest when you feel tired. Getting enough sleep will help you recover. · Try to walk each day. Start by walking a little more than you did the day before. Bit by bit, increase the amount you walk. Walking boosts blood flow and helps prevent pneumonia and constipation. Walking may also decrease your muscle soreness after surgery. · Follow your doctor's directions about not lifting anything that would strain your neck and back. This may include a child, heavy grocery bags and milk containers, a heavy briefcase or backpack, cat litter or dog food bags, or a vacuum . · Avoid strenuous activities, such as bicycle riding, jogging, weightlifting, or aerobic exercise, until your doctor says it is okay. · Do not drive for 2 to 4 weeks after your surgery or until your doctor says it is okay. · Avoid taking long car trips for 2 to 4 weeks after surgery. Your neck may become tired and painful from sitting too long in one position. · You will probably need to take 4 to 6 weeks off from work. It depends on the type of work you do and how you feel.   · You may have sex as soon as you feel able, but avoid positions that put stress on your neck or cause pain. Diet  · You can eat your normal diet. If your stomach is upset, try bland, low-fat foods like plain rice, broiled chicken, toast, and yogurt. · Drink plenty of fluids. If you have kidney, heart, or liver disease and have to limit fluids, talk with your doctor before you increase the amount of fluids you drink. · You may notice that your bowel movements are not regular right after your surgery. This is common. Try to avoid constipation and straining with bowel movements. You may want to take a fiber supplement every day. If you have not had a bowel movement after a couple of days, ask your doctor about taking a mild laxative. Medicines  · Your doctor will tell you if and when you can restart your medicines. He or she will also give you instructions about taking any new medicines. · If you take blood thinners, such as warfarin (Coumadin), clopidogrel (Plavix), or aspirin, be sure to talk to your doctor. He or she will tell you if and when to start taking those medicines again. Make sure that you understand exactly what your doctor wants you to do. · Be safe with medicines. Take pain medicines exactly as directed. ¨ If the doctor gave you a prescription medicine for pain, take it as prescribed. ¨ If you are not taking a prescription pain medicine, ask your doctor if you can take an over-the-counter medicine. · If your doctor prescribed antibiotics, take them as directed. Do not stop taking them just because you feel better. You need to take the full course of antibiotics. · If you think your pain pill is making you sick to your stomach:  ¨ Take your pills after meals (unless your doctor has told you not to). ¨ Ask your doctor for a different pain pill. Incision care  · You will be given specific instructions about how to care for the cut (incision) the doctor made.  The instructions will depend on the type of materials used to close the cut.  Exercise  · Do exercises as instructed by your doctor or physical therapist to improve your strength and flexibility. Other instructions  · To reduce stiffness and help sore muscles, use a warm water bottle, a heating pad set on low, or a warm cloth on your neck. Do not put heat right over the incision. Do not go to sleep with a heating pad on your skin. Follow-up care is a key part of your treatment and safety. Be sure to make and go to all appointments, and call your doctor if you are having problems. It's also a good idea to know your test results and keep a list of the medicines you take. When should you call for help? Call 911 anytime you think you may need emergency care. For example, call if:  · You passed out (lost consciousness). · You have sudden chest pain and shortness of breath, or you cough up blood. · You cannot swallow. · You have severe pain in your neck or back. · You are unable to move an arm or a leg at all. Call your doctor now or seek immediate medical care if:  · You have pain that does not get better after you take pain pills. · You have signs of infection, such as:  ¨ Increased pain, swelling, warmth, or redness. ¨ Red streaks leading from the site. ¨ Pus draining from the site. ¨ A fever. · You have new or worse symptoms in your arms, legs, chest, belly, or buttocks. Symptoms may include:  ¨ Numbness or tingling. ¨ Weakness. ¨ Pain. · You lose bladder or bowel control. · You have loose stitches, or your incision comes open. · You have blood or fluid draining from the incision. Watch closely for changes in your health, and be sure to contact your doctor if:  · You do not have a bowel movement after taking a laxative. · You are not getting better as expected. Where can you learn more? Go to http://jose martin-karrie.info/. Enter L265 in the search box to learn more about \"Cervical Spinal Fusion: What to Expect at Home. \"  Current as of: May 23, 2016  Content Version: 11.2  © 4224-4916 [employer]. Care instructions adapted under license by [employer] (which disclaims liability or warranty for this information). If you have questions about a medical condition or this instruction, always ask your healthcare professional. [address] any warranty or liability for your use of this information.

## 2017-04-11 NOTE — LETTER
NOTIFICATION RETURN TO WORK / SCHOOL 
 
4/11/2017 9:29 AM 
 
Ms. Hector Tran [address] [address] To Whom It May Concern: Hector Tran is currently under the care of [facility]. She will return to work on 4/17/2017, with no lifting greater than 25lbs, and limited overhead reaching. If there are questions or concerns please have the patient contact our office. Sincerely, Tru Kc NP

## 2017-04-11 NOTE — MR AVS SNAPSHOT
Visit Information Date & Time Provider Department Dept. Phone Encounter #  
 4/11/2017  8:30 AM Zander Painting NP [facility] [phone] [id number] Follow-up Instructions Return in about 6 weeks (around 5/23/2017) for with Adri. Upcoming Health Maintenance Date Due Pneumococcal 19-64 Medium Risk (1 of 1 - PPSV23) 12/12/1987 DTaP/Tdap/Td series (1 - Tdap) 12/12/1989 PAP AKA CERVICAL CYTOLOGY 12/12/1989 INFLUENZA AGE 9 TO ADULT 8/1/2016 Allergies as of 4/11/2017  Review Complete On: 4/11/2017 By: Zander Painting NP No Known Allergies Current Immunizations  Never Reviewed No immunizations on file. Not reviewed this visit You Were Diagnosed With   
  
 Codes Comments S/P cervical spinal fusion    -  Primary ICD-10-CM: Z98.1 ICD-9-CM: V45.4 Myofascial pain     ICD-10-CM: M79.1 ICD-9-CM: 729.1 Vitals BP Pulse Temp Resp Height(growth percentile) Weight(growth percentile) 126/73 85 98 °F (36.7 °C) (Oral) 18 5' 2\" (1.575 m) 188 lb (85.3 kg) SpO2 BMI OB Status Smoking Status 97% 34.39 kg/m2 Hysterectomy Current Every Day Smoker BMI and BSA Data Body Mass Index Body Surface Area  
 34.39 kg/m 2 1.93 m 2 Preferred Pharmacy Pharmacy Name Phone [id number], [address] [place] [address],[address] [facility] [phone] Your Updated Medication List  
  
   
This list is accurate as of: 4/11/17  9:32 AM.  Always use your most recent med list.  
  
  
  
  
 acetaminophen-codeine 300-30 mg per tablet Commonly known as:  TYLENOL #3  
TAKE 1 TABLET BY MOUTH EVERY 3-4 HOURS AS NEEDED FOR PAIN  
  
 ALLEGRA-D 12 HOUR  mg Tb12 Generic drug:  fexofenadine-pseudoephedrine Take 1 Tab by mouth every twelve (12) hours. amoxicillin 500 mg Tab TAKE 2 TABLETS BY MOUTH AT BEDTIME TONIGHT,THEN TAKE 1 TABLET BY MOUTH EVERY 6 HOURS UNTIL GONE  
  
 * CHANTIX CONTINUING MONTH YVONNE 1 mg tablet Generic drug:  varenicline Take 1 mg by mouth two (2) times daily (after meals). * CHANTIX STARTING MONTH BOX 0.5 mg (11)- 1 mg (42) Dspk Generic drug:  varenicline AS DIRECTED ORALLY * cyclobenzaprine 10 mg tablet Commonly known as:  FLEXERIL * cyclobenzaprine 10 mg tablet Commonly known as:  FLEXERIL Take 1 Tab by mouth nightly as needed for Muscle Spasm(s). doxycycline 100 mg tablet Commonly known as:  VIBRA-TABS TAKE 1 TABLET BY MOUTH TWICE A DAY  
  
 gabapentin 600 mg tablet Commonly known as:  NEURONTIN Take 1 Tab by mouth four (4) times daily. * HYDROcodone-acetaminophen 5-325 mg per tablet Commonly known as:  NORCO  
  
 * HYDROcodone-acetaminophen 5-325 mg per tablet Commonly known as:  Elyn Barley Take 1 Tab by mouth every eight (8) hours as needed for Pain. Max Daily Amount: 3 Tabs. melatonin Tab tablet Take 10 mg by mouth nightly. methylPREDNISolone 4 mg tablet Commonly known as:  Tram Underwood Per dose pack instructions  
  
 oxyCODONE-acetaminophen  mg per tablet Commonly known as:  PERCOCET 10 Take 1 Tab by mouth every six (6) hours as needed. Max Daily Amount: 4 Tabs. WELLBUTRIN  mg SR tablet Generic drug:  buPROPion SR Take 150 mg by mouth two (2) times a day. * Notice: This list has 6 medication(s) that are the same as other medications prescribed for you. Read the directions carefully, and ask your doctor or other care provider to review them with you. Prescriptions Sent to Pharmacy Refills  
 cyclobenzaprine (FLEXERIL) 10 mg tablet 2 Sig: Take 1 Tab by mouth nightly as needed for Muscle Spasm(s). Class: Normal  
 Pharmacy: [facility]/pharmacy [address] [place] [address], [facility],[address] #: [phone] Route: Oral  
  
We Performed the Following AMB POC XRAY, SPINE, CERVICAL; 2 OR 3 [88632 CPT(R)] Follow-up Instructions Return in about 6 weeks (around 5/23/2017) for komal Rush. Patient Instructions Cervical Spinal Fusion: What to Expect at [facility] Your Recovery After surgery, you can expect your neck to feel stiff and sore. This should improve in the weeks after surgery. You may have trouble sitting or standing in one position for very long and may need pain medicine in the weeks after your surgery. You may need to wear a neck brace for a while. It may take 4 to 6 weeks to get back to your usual activities, but it may depend on what kind of surgery you had. Your doctor may advise you to work with a physical therapist to strengthen the muscles around your neck and back. This care sheet gives you a general idea about how long it will take for you to recover. But each person recovers at a different pace. Follow the steps below to get better as quickly as possible. How can you care for yourself at home? Activity · Rest when you feel tired. Getting enough sleep will help you recover. · Try to walk each day. Start by walking a little more than you did the day before. Bit by bit, increase the amount you walk. Walking boosts blood flow and helps prevent pneumonia and constipation. Walking may also decrease your muscle soreness after surgery. · Follow your doctor's directions about not lifting anything that would strain your neck and back. This may include a child, heavy grocery bags and milk containers, a heavy briefcase or backpack, cat litter or dog food bags, or a vacuum . · Avoid strenuous activities, such as bicycle riding, jogging, weightlifting, or aerobic exercise, until your doctor says it is okay. · Do not drive for 2 to 4 weeks after your surgery or until your doctor says it is okay. · Avoid taking long car trips for 2 to 4 weeks after surgery. Your neck may become tired and painful from sitting too long in one position. · You will probably need to take 4 to 6 weeks off from work. It depends on the type of work you do and how you feel. · You may have sex as soon as you feel able, but avoid positions that put stress on your neck or cause pain. Diet · You can eat your normal diet. If your stomach is upset, try bland, low-fat foods like plain rice, broiled chicken, toast, and yogurt. · Drink plenty of fluids. If you have kidney, heart, or liver disease and have to limit fluids, talk with your doctor before you increase the amount of fluids you drink. · You may notice that your bowel movements are not regular right after your surgery. This is common. Try to avoid constipation and straining with bowel movements. You may want to take a fiber supplement every day. If you have not had a bowel movement after a couple of days, ask your doctor about taking a mild laxative. Medicines · Your doctor will tell you if and when you can restart your medicines. He or she will also give you instructions about taking any new medicines. · If you take blood thinners, such as warfarin (Coumadin), clopidogrel (Plavix), or aspirin, be sure to talk to your doctor. He or she will tell you if and when to start taking those medicines again. Make sure that you understand exactly what your doctor wants you to do. · Be safe with medicines. Take pain medicines exactly as directed. ¨ If the doctor gave you a prescription medicine for pain, take it as prescribed. ¨ If you are not taking a prescription pain medicine, ask your doctor if you can take an over-the-counter medicine. · If your doctor prescribed antibiotics, take them as directed. Do not stop taking them just because you feel better. You need to take the full course of antibiotics. · If you think your pain pill is making you sick to your stomach: 
¨ Take your pills after meals (unless your doctor has told you not to). ¨ Ask your doctor for a different pain pill. Incision care · You will be given specific instructions about how to care for the cut (incision) the doctor made. The instructions will depend on the type of materials used to close the cut. Exercise · Do exercises as instructed by your doctor or physical therapist to improve your strength and flexibility. Other instructions · To reduce stiffness and help sore muscles, use a warm water bottle, a heating pad set on low, or a warm cloth on your neck. Do not put heat right over the incision. Do not go to sleep with a heating pad on your skin. Follow-up care is a key part of your treatment and safety. Be sure to make and go to all appointments, and call your doctor if you are having problems. It's also a good idea to know your test results and keep a list of the medicines you take. When should you call for help? Call 911 anytime you think you may need emergency care. For example, call if: 
· You passed out (lost consciousness). · You have sudden chest pain and shortness of breath, or you cough up blood. · You cannot swallow. · You have severe pain in your neck or back. · You are unable to move an arm or a leg at all. Call your doctor now or seek immediate medical care if: 
· You have pain that does not get better after you take pain pills. · You have signs of infection, such as: 
¨ Increased pain, swelling, warmth, or redness. ¨ Red streaks leading from the site. ¨ Pus draining from the site. ¨ A fever. · You have new or worse symptoms in your arms, legs, chest, belly, or buttocks. Symptoms may include: ¨ Numbness or tingling. ¨ Weakness. ¨ Pain. · You lose bladder or bowel control. · You have loose stitches, or your incision comes open. · You have blood or fluid draining from the incision. Watch closely for changes in your health, and be sure to contact your doctor if: 
· You do not have a bowel movement after taking a laxative. · You are not getting better as expected. Where can you learn more? Go to http://jose martin-karrie.info/. Enter P230 in the search box to learn more about \"Cervical Spinal Fusion: What to Expect at Home. \" Current as of: May 23, 2016 Content Version: 11.2 © 4182-1885 [employer]. Care instructions adapted under license by [employer] (which disclaims liability or warranty for this information). If you have questions about a medical condition or this instruction, always ask your healthcare professional. [address] any warranty or liability for your use of this information. Introducing [facility]! Dear Federico Ring: Thank you for requesting a Eons account. Our records indicate that you already have an active Eons account. You can access your account anytime at [email or web address] Did you know that you can access your hospital and ER discharge instructions at any time in Eons? You can also review all of your test results from your hospital stay or ER visit. Additional Information If you have questions, please visit the Frequently Asked Questions section of the Eons website at [email or web address] Remember, Eons is NOT to be used for urgent needs. For medical emergencies, dial 911. Now available from your iPhone and Android! Please provide this summary of care documentation to your next provider. Your primary care clinician is listed as Dharmesh Khanna. If you have any questions after today's visit, please call [phone].

## 2017-06-21 ENCOUNTER — OFFICE VISIT (OUTPATIENT)
Dept: ORTHOPEDIC SURGERY | Age: 47
End: 2017-06-21

## 2017-06-21 VITALS
BODY MASS INDEX: 34.61 KG/M2 | WEIGHT: 189.2 LBS | SYSTOLIC BLOOD PRESSURE: 123 MMHG | RESPIRATION RATE: 18 BRPM | HEART RATE: 89 BPM | OXYGEN SATURATION: 98 % | DIASTOLIC BLOOD PRESSURE: 79 MMHG | TEMPERATURE: 98.1 F

## 2017-06-21 DIAGNOSIS — Z98.1 S/P CERVICAL SPINAL FUSION: Primary | ICD-10-CM

## 2017-06-21 DIAGNOSIS — M48.02 CERVICAL SPINAL STENOSIS: ICD-10-CM

## 2017-06-21 RX ORDER — CYCLOBENZAPRINE HCL 10 MG
10 TABLET ORAL
Qty: 30 TAB | Refills: 2 | Status: SHIPPED | OUTPATIENT
Start: 2017-06-21 | End: 2018-04-27 | Stop reason: SDUPTHER

## 2017-06-21 NOTE — MR AVS SNAPSHOT
Visit Information Date & Time Provider Department Dept. Phone Encounter #  
 6/21/2017  8:30 AM Daniela Fishman NP [facility] [id number] Follow-up Instructions Follow-up and Disposition History Your Appointments 8/31/2017  3:00 PM  
Follow Up with Daniela Fishman NP  
[facility] ([facility]) Appt Note: fu appt [address] [facility] [address]  
[phone]  
  
   
 [address] [facility] [address] Upcoming Health Maintenance Date Due Pneumococcal 19-64 Medium Risk (1 of 1 - PPSV23) 12/12/1987 DTaP/Tdap/Td series (1 - Tdap) 12/12/1989 PAP AKA CERVICAL CYTOLOGY 12/12/1989 INFLUENZA AGE 9 TO ADULT 8/1/2017 Allergies as of 6/21/2017  Review Complete On: 6/21/2017 By: Daniela Fishman NP No Known Allergies Current Immunizations  Never Reviewed No immunizations on file. Not reviewed this visit You Were Diagnosed With   
  
 Codes Comments S/P cervical spinal fusion    -  Primary ICD-10-CM: Z98.1 ICD-9-CM: V45.4 Cervical spinal stenosis     ICD-10-CM: M48.02 
ICD-9-CM: 723.0 Vitals BP Pulse Temp Resp Weight(growth percentile) SpO2  
 123/79 89 98.1 °F (36.7 °C) (Oral) 18 189 lb 3.2 oz (85.8 kg) 98% BMI OB Status Smoking Status 34.61 kg/m2 Hysterectomy Current Every Day Smoker BMI and BSA Data Body Mass Index Body Surface Area  
 34.61 kg/m 2 1.94 m 2 Preferred Pharmacy Pharmacy Name Phone [id number] Mar[address], [address] [facility],[address] [facility] [phone] Your Updated Medication List  
  
   
This list is accurate as of: 6/21/17  8:57 AM.  Always use your most recent med list.  
  
  
  
  
 acetaminophen-codeine 300-30 mg per tablet Commonly known as:  TYLENOL #3  
TAKE 1 TABLET BY MOUTH EVERY 3-4 HOURS AS NEEDED FOR PAIN  
  
 ALLEGRA-D 12 HOUR  mg Tb12 Generic drug:  fexofenadine-pseudoephedrine Take 1 Tab by mouth every twelve (12) hours. amoxicillin 500 mg Tab TAKE 2 TABLETS BY MOUTH AT BEDTIME TONIGHT,THEN TAKE 1 TABLET BY MOUTH EVERY 6 HOURS UNTIL GONE  
  
 * CHANTIX CONTINUING MONTH YVONNE 1 mg tablet Generic drug:  varenicline Take 1 mg by mouth two (2) times daily (after meals). * CHANTIX STARTING MONTH BOX 0.5 mg (11)- 1 mg (42) Dspk Generic drug:  varenicline AS DIRECTED ORALLY  
  
 cyclobenzaprine 10 mg tablet Commonly known as:  FLEXERIL Take 1 Tab by mouth nightly as needed for Muscle Spasm(s). doxycycline 100 mg tablet Commonly known as:  VIBRA-TABS TAKE 1 TABLET BY MOUTH TWICE A DAY  
  
 gabapentin 600 mg tablet Commonly known as:  NEURONTIN Take 1 Tab by mouth four (4) times daily. * HYDROcodone-acetaminophen 5-325 mg per tablet Commonly known as:  NORCO  
  
 * HYDROcodone-acetaminophen 5-325 mg per tablet Commonly known as:  Ceil Heap Take 1 Tab by mouth every eight (8) hours as needed for Pain. Max Daily Amount: 3 Tabs. melatonin Tab tablet Take 10 mg by mouth nightly. methylPREDNISolone 4 mg tablet Commonly known as:  [place] Per dose pack instructions  
  
 oxyCODONE-acetaminophen  mg per tablet Commonly known as:  PERCOCET 10 Take 1 Tab by mouth every six (6) hours as needed. Max Daily Amount: 4 Tabs. WELLBUTRIN  mg SR tablet Generic drug:  buPROPion SR Take 150 mg by mouth two (2) times a day. * Notice: This list has 4 medication(s) that are the same as other medications prescribed for you. Read the directions carefully, and ask your doctor or other care provider to review them with you. Prescriptions Sent to Pharmacy Refills  
 cyclobenzaprine (FLEXERIL) 10 mg tablet 2 Sig: Take 1 Tab by mouth nightly as needed for Muscle Spasm(s).   
 Class: Normal  
 [id number], [address] [place] [address],[address]  #: [phone] Route: Oral  
  
We Performed the Following AMB POC XRAY, SPINE, CERVICAL; 2 OR 3 [12802 CPT(R)] Patient Instructions Neck: Exercises Your Care Instructions Here are some examples of typical rehabilitation exercises for your condition. Start each exercise slowly. Ease off the exercise if you start to have pain. Your doctor or physical therapist will tell you when you can start these exercises and which ones will work best for you. How to do the exercises Note: Stretching should make you feel a gentle stretch, but no pain. Stop any strengthening exercise that makes pain worse. Neck stretch 1. This stretch works best if you keep your shoulder down as you lean away from it. To help you remember to do this, start by relaxing your shoulders and lightly holding on to your thighs or your chair. 2. Tilt your head toward your shoulder and hold for 15 to 30 seconds. Let the weight of your head stretch your muscles. 3. If you would like a little added stretch, use your hand to gently and steadily pull your head toward your shoulder. For example, keeping your right shoulder down, lean your head to the left. 4. Repeat 2 to 4 times toward each shoulder. Diagonal neck stretch 1. Turn your head slightly toward the direction you will be stretching, and tilt your head diagonally toward your chest and hold for 15 to 30 seconds. 2. If you would like a little added stretch, use your hand to gently and steadily pull your head forward on the diagonal. 
3. Repeat 2 to 4 times toward each side. Dorsal glide stretch 1. Sit or stand tall and look straight ahead. 2. Slowly tuck your chin as you glide your head backward over your body 3. Hold for a count of 6, and then relax for up to 10 seconds. 4. Repeat 8 to 12 times. Note: The dorsal glide stretches the back of the neck.  If you feel pain, do not glide so far back. Some people find this exercise easier to do while lying on their backs with an ice pack on the neck. Chest and shoulder stretch 1. Sit or stand tall and glide your head backward as in the dorsal glide stretch. 2. Raise both arms so that your hands are next to your ears. 3. Take a deep breath, and as you breathe out, lower your elbows down and behind your back. You will feel your shoulder blades slide down and together, and at the same time you will feel a stretch across your chest and the front of your shoulders. 4. Hold for about 6 seconds, and then relax for up to 10 seconds. 5. Repeat 8 to 12 times. Strengthening: Hands on head 1. Move your head backward, forward, and side to side against gentle pressure from your hands, holding each position for about 6 seconds. 2. Repeat 8 to 12 times. Follow-up care is a key part of your treatment and safety. Be sure to make and go to all appointments, and call your doctor if you are having problems. It's also a good idea to know your test results and keep a list of the medicines you take. Where can you learn more? Go to [email or web address] [email or web address]-karrie.info/. Enter P975 in the search box to learn more about \"Neck: Exercises. \" Current as of: March 21, 2017 Content Version: 11.3 © 4160-8976 [employer]. Care instructions adapted under license by [employer] (which disclaims liability or warranty for this information). If you have questions about a medical condition or this instruction, always ask your healthcare professional. [place] [address] any warranty or liability for your use of this information. Introducing [facility]! Dear David Tipton: Thank you for requesting a travayl account. Our records indicate that you already have an active travayl account. You can access your account anytime at [email or web address] Did you know that you can access your hospital and ER discharge instructions at any time in [employer]? You can also review all of your test results from your hospital stay or ER visit. Additional Information If you have questions, please visit the Frequently Asked Questions section of the [employer] website at [email or web address] Remember, [employer] is NOT to be used for urgent needs. For medical emergencies, dial 911. Now available from your iPhone and Android! Please provide this summary of care documentation to your next provider. Your primary care clinician is listed as Dharmesh Khanna. If you have any questions after today's visit, please call [phone].

## 2017-06-21 NOTE — PATIENT INSTRUCTIONS
Neck: Exercises  Your Care Instructions  Here are some examples of typical rehabilitation exercises for your condition. Start each exercise slowly. Ease off the exercise if you start to have pain. Your doctor or physical therapist will tell you when you can start these exercises and which ones will work best for you. How to do the exercises  Note: Stretching should make you feel a gentle stretch, but no pain. Stop any strengthening exercise that makes pain worse. Neck stretch    1. This stretch works best if you keep your shoulder down as you lean away from it. To help you remember to do this, start by relaxing your shoulders and lightly holding on to your thighs or your chair. 2. Tilt your head toward your shoulder and hold for 15 to 30 seconds. Let the weight of your head stretch your muscles. 3. If you would like a little added stretch, use your hand to gently and steadily pull your head toward your shoulder. For example, keeping your right shoulder down, lean your head to the left. 4. Repeat 2 to 4 times toward each shoulder. Diagonal neck stretch    1. Turn your head slightly toward the direction you will be stretching, and tilt your head diagonally toward your chest and hold for 15 to 30 seconds. 2. If you would like a little added stretch, use your hand to gently and steadily pull your head forward on the diagonal.  3. Repeat 2 to 4 times toward each side. Dorsal glide stretch    1. Sit or stand tall and look straight ahead. 2. Slowly tuck your chin as you glide your head backward over your body  3. Hold for a count of 6, and then relax for up to 10 seconds. 4. Repeat 8 to 12 times. Note: The dorsal glide stretches the back of the neck. If you feel pain, do not glide so far back. Some people find this exercise easier to do while lying on their backs with an ice pack on the neck. Chest and shoulder stretch    1.  Sit or stand tall and glide your head backward as in the dorsal glide stretch. 2. Raise both arms so that your hands are next to your ears. 3. Take a deep breath, and as you breathe out, lower your elbows down and behind your back. You will feel your shoulder blades slide down and together, and at the same time you will feel a stretch across your chest and the front of your shoulders. 4. Hold for about 6 seconds, and then relax for up to 10 seconds. 5. Repeat 8 to 12 times. Strengthening: Hands on head    1. Move your head backward, forward, and side to side against gentle pressure from your hands, holding each position for about 6 seconds. 2. Repeat 8 to 12 times. Follow-up care is a key part of your treatment and safety. Be sure to make and go to all appointments, and call your doctor if you are having problems. It's also a good idea to know your test results and keep a list of the medicines you take. Where can you learn more? Go to http://jose martin-karrie.info/. Enter P975 in the search box to learn more about \"Neck: Exercises. \"  Current as of: March 21, 2017  Content Version: 11.3  © 1480-9219 [employer]. Care instructions adapted under license by [employer] (which disclaims liability or warranty for this information). If you have questions about a medical condition or this instruction, always ask your healthcare professional. [address] any warranty or liability for your use of this information.

## 2017-08-31 ENCOUNTER — OFFICE VISIT (OUTPATIENT)
Dept: ORTHOPEDIC SURGERY | Age: 47
End: 2017-08-31

## 2017-08-31 VITALS
TEMPERATURE: 97.1 F | DIASTOLIC BLOOD PRESSURE: 68 MMHG | SYSTOLIC BLOOD PRESSURE: 108 MMHG | BODY MASS INDEX: 35.15 KG/M2 | OXYGEN SATURATION: 94 % | WEIGHT: 191 LBS | HEIGHT: 62 IN | HEART RATE: 74 BPM | RESPIRATION RATE: 17 BRPM

## 2017-08-31 DIAGNOSIS — M48.02 CERVICAL SPINAL STENOSIS: Primary | ICD-10-CM

## 2017-08-31 DIAGNOSIS — R20.0 NUMBNESS OF RIGHT HAND: ICD-10-CM

## 2017-08-31 DIAGNOSIS — M25.511 RIGHT SHOULDER PAIN, UNSPECIFIED CHRONICITY: ICD-10-CM

## 2017-08-31 DIAGNOSIS — Z98.1 S/P CERVICAL SPINAL FUSION: ICD-10-CM

## 2017-08-31 NOTE — PROGRESS NOTES
[address]  [address]  [facility], [address]  Phone: [phone]  Fax: [phone]  PROGRESS NOTE  Patient: Kelly Catalan                MRN: [id number]       SSN: [id number]  YOB: 1970        AGE: 48 y.o. SEX: female  Body mass index is 34.93 kg/(m^2). PCP: Cortney Brasher MD  08/31/17    Chief Complaint   Patient presents with    Shoulder Pain     FU       HISTORY OF PRESENT ILLNESS:   Kelly Catalan is a 48 y.o.  female with history of neck pain, arm pain and numbness of the arm(s) on the right who had ACDF C4-6 surgery 6 months ago. Her arm pain has resolved except she is having two separate locations of pain in her RUE. First, she has right hand numbness, it is consistent with carpal tunnel. She has a + phalen's sign. She is also having right shoulder pain consistent with rotator cuff pain. It hurts when raising her arm up. She has no neck pain, just some intermittent muscle spasms. Her shoulder pain is achy in nature. Pain is better with massage, relaxation, muscle relaxers and heating pad. Her neck pain, arm pain, shooting pains in the arm(s) and numbness of the arm(s) has significantly improved since surgery. Her pain is now localized to right shoulder and right hand. She reports that due to her increased hand numbness that she is dropping things more often. I do not believe this is related to her neck, however; she was having this issue prior to her neck surgery, and it has gotten worse  She is having no radicular type pain, just localized hand numbness and she has a +phalen's sign. She is otherwise neurologically intact. No balance issues, -mares's, and no neck pain. I recommended an EMG to confirm dx, however; pt wishes to try a hand brace and carpal tunnel exercises first.  The first available appointment for EMG is October 20th.   I think it is reasonable to get her scheduled now while she tries conservative care. I got a set of 2-view cervical x-rays for Dr. Niranjan Abarca review. She was educated to call if she starts having pain going down the arm or if she loses strength in the arm. States she has been using Flexeril 10mg PRN with moderate, complete, relief. Patient denies any fevers, bladder/bowel dysfunction, new onset weakness, saddle paresthesia, new onset radiculopathy or nerve pain, or other neurological deficits. Reports that she is swallowing without difficulty. Pt desires to continue with evaluation of right shoulder pain and right hand numbness and postoperative care. ASSESSMENT   Diagnoses and all orders for this visit:    1. Cervical spinal stenosis  -     AMB POC XRAY, SPINE, CERVICAL; 2 OR 3    2. S/P cervical spinal fusion  -     AMB POC XRAY, SPINE, CERVICAL; 2 OR 3    3. Numbness of right hand  -     EMG ONE EXTREMITY UPPER RT; Future  -     Generic Supply Order    4. Right shoulder pain, unspecified chronicity       IMPRESSION AND PLAN:.     1) Pt was given information on carpal tunnel and shoulder exercises   2) Reviewed activity   3) Wrist brace at night and while on computer  4) Continue Flexeril  5) EMG if right hand numbness doesn't improve  6) Ms. Sheila Roger has a reminder for a \"due or due soon\" health maintenance. I have asked that she contact her primary care provider, Bertin Rodriguez MD, for follow-up on this health maintenance. 7) We have informed the patient to notify us for immediate appointment if he has any worsening neurogical symptoms or if an emergency situation presents, then call 911  8)  demonstrated consistency with prescribing. 9) Pt will follow-up in 6-8 weeks.     SUBJECTIVE    Smoking Status non-smoker  Work :     Pain Scale: 0 - No pain/10    Pain Assessment  8/31/2017   Location of Pain Shoulder;Hand   Pain Location Comment -   Location Modifiers -   Severity of Pain 0   Quality of Pain Aching   Quality of Pain Comment numbness Duration of Pain -   Frequency of Pain -   Aggravating Factors (No Data)   Aggravating Factors Comment excessive activity   Limiting Behavior -   Relieving Factors (No Data)   Relieving Factors Comment Pain Medication   Result of Injury -           REVIEW OF SYSTEMS  Constitutional: Negative for fever, chills, or weight change. Respiratory: Negative for cough or shortness of breath. Cardiovascular: Negative for chest pain or palpitations. Gastrointestinal: Negative for incontinence, acid reflux, change in bowel habits, or constipation. Genitourinary: Negative for incontinence, dysuria and flank pain. Musculoskeletal: Positive for right hand numbness and right shoulder pain. See HPI. Skin: Negative for rash. Neurological:No radiculopathy, right hand numbness. See HPI. Endo/Heme/Allergies: Negative. Psychiatric/Behavioral: Negative. PHYSICAL EXAMINATION  Visit Vitals    /68    Pulse 74    Temp 97.1 °F (36.2 °C) (Oral)    Resp 17    Ht 5' 2\" (1.575 m)    Wt 191 lb (86.6 kg)    SpO2 94%    BMI 34.93 kg/m2         Accompanied by self. Constitutional:  Well developed, well nourished, in no acute distress. Psychiatric: Affect and mood are appropriate. Integumentary: No rashes or abrasions noted on exposed areas. W  Cardiovascular/Peripheral Vascular: +2 radial & pedal pulses. No peripheral edema is noted. Lymphatic:  No evidence of lymphedema. No cervical lymphadenopathy. SPINE/MUSCULOSKELETAL EXAM    Cervical spine:  Neck is midline. Normal muscle tone. No focal atrophy is noted. Shoulder ROM: increased pain w/ abduction of right shoulder. Neck ROM intact with flexion, extension, turning right, turning left. Tenderness to palpation anterior right shoulder. Negative Boyle's sign.      Sensation grossly intact to light touch.   + Right hand Phalen's sign      MOTOR:     Biceps Triceps Deltoids Wrist Ext Wrist Flex Hand Intrin   Right 5/5 5/5 5/5 5/5 5/5 5/5   Left 5/5 5/5 5/5 5/5 5/5 5/5         DTRs are 1+ biceps, triceps, brachioradialis. normal gait and station    Ambulation without assistive device. FWB. IMAGIN view cervical x-rays    PAST MEDICAL HISTORY   Past Medical History:   Diagnosis Date    Psychiatric disorder     depression       Past Surgical History:   Procedure Laterality Date    HX GYN      hysterectomy    HX GYN      c/s   . MEDICATIONS      Current Outpatient Prescriptions   Medication Sig Dispense Refill    fexofenadine-pseudoephedrine (ALLEGRA-D 12 HOUR)  mg Tb12 Take 1 Tab by mouth every twelve (12) hours.  melatonin tab tablet Take 10 mg by mouth nightly.  buPROPion SR (WELLBUTRIN SR) 150 mg SR tablet Take 150 mg by mouth two (2) times a day.  cyclobenzaprine (FLEXERIL) 10 mg tablet Take 1 Tab by mouth nightly as needed for Muscle Spasm(s). 30 Tab 2    doxycycline (VIBRA-TABS) 100 mg tablet TAKE 1 TABLET BY MOUTH TWICE A DAY  0    acetaminophen-codeine (TYLENOL #3) 300-30 mg per tablet TAKE 1 TABLET BY MOUTH EVERY 3-4 HOURS AS NEEDED FOR PAIN  0    amoxicillin 500 mg tab TAKE 2 TABLETS BY MOUTH AT BEDTIME TONIGHT,THEN TAKE 1 TABLET BY MOUTH EVERY 6 HOURS UNTIL GONE  0    CHANTIX STARTING MONTH BOX 0.5 mg (11)- 1 mg (42) DsPk AS DIRECTED ORALLY  0    HYDROcodone-acetaminophen (NORCO) 5-325 mg per tablet Take 1 Tab by mouth every eight (8) hours as needed for Pain. Max Daily Amount: 3 Tabs. 90 Tab 0    methylPREDNISolone (MEDROL DOSEPACK) 4 mg tablet Per dose pack instructions 21 Tab 0    oxyCODONE-acetaminophen (PERCOCET 10)  mg per tablet Take 1 Tab by mouth every six (6) hours as needed. Max Daily Amount: 4 Tabs. (Patient not taking: Reported on 3/9/2017) 40 Tab 0    varenicline (CHANTIX CONTINUING MONTH ) 1 mg tablet Take 1 mg by mouth two (2) times daily (after meals).       HYDROcodone-acetaminophen (NORCO) 5-325 mg per tablet       gabapentin (NEURONTIN) 600 mg tablet Take 1 Tab by mouth four (4) times daily. 120 Tab 1        ALLERGIES  No Known Allergies       SOCIAL HISTORY    Social History     Social History    Marital status:      Spouse name: N/A    Number of children: N/A    Years of education: N/A     Occupational History    Not on file.      Social History Main Topics    Smoking status: Current Every Day Smoker     Packs/day: 0.50    Smokeless tobacco: Never Used    Alcohol use Yes      Comment: socially    Drug use: Not on file    Sexual activity: Not on file     Other Topics Concern    Not on file     Social History Narrative       FAMILY HISTORY    Family History   Problem Relation Age of Onset    No Known Problems Mother     No Known Problems Father     No Known Problems Sister     No Known Problems Brother          Raegan Beavers, NP

## 2017-08-31 NOTE — PATIENT INSTRUCTIONS
Rotator Cuff: Exercises  Your Care Instructions  Here are some examples of typical rehabilitation exercises for your condition. Start each exercise slowly. Ease off the exercise if you start to have pain. Your doctor or physical therapist will tell you when you can start these exercises and which ones will work best for you. How to do the exercises  Pendulum swing    Note: If you have pain in your back, do not do this exercise. 1. Hold on to a table or the back of a chair with your good arm. Then bend forward a little and let your sore arm hang straight down. This exercise does not use the arm muscles. Rather, use your legs and your hips to create movement that makes your arm swing freely. 2. Use the movement from your hips and legs to guide the slightly swinging arm back and forth like a pendulum (or elephant trunk). Then guide it in circles that start small (about the size of a dinner plate). Make the circles a bit larger each day, as your pain allows. 3. Do this exercise for 5 minutes, 5 to 7 times each day. 4. As you have less pain, try bending over a little farther to do this exercise. This will increase the amount of movement at your shoulder. Posterior stretching exercise    1. Hold the elbow of your injured arm with your other hand. 2. Use your hand to pull your injured arm gently up and across your body. You will feel a gentle stretch across the back of your injured shoulder. 3. Hold for at least 15 to 30 seconds. Then slowly lower your arm. 4. Repeat 2 to 4 times. Up-the-back stretch    Note: Your doctor or physical therapist may want you to wait to do this stretch until you have regained most of your range of motion and strength. You can do this stretch in different ways. Hold any of these stretches for at least 15 to 30 seconds. Repeat them 2 to 4 times. 1. Put your hand in your back pocket. Let it rest there to stretch your shoulder.   2. With your other hand, hold your injured arm (palm outward) behind your back by the wrist. Pull your arm up gently to stretch your shoulder. 3. Next, put a towel over your other shoulder. Put the hand of your injured arm behind your back. Now hold the back end of the towel. With the other hand, hold the front end of the towel in front of your body. Pull gently on the front end of the towel. This will bring your hand farther up your back to stretch your shoulder. Overhead stretch    1. Standing about an arm's length away, grasp onto a solid surface. You could use a countertop, a doorknob, or the back of a sturdy chair. 2. With your knees slightly bent, bend forward with your arms straight. Lower your upper body, and let your shoulders stretch. 3. As your shoulders are able to stretch farther, you may need to take a step or two backward. 4. Hold for at least 15 to 30 seconds. Then stand up and relax. If you had stepped back during your stretch, step forward so you can keep your hands on the solid surface. 5. Repeat 2 to 4 times. Shoulder flexion (lying down)    Note: To make a wand for this exercise, use a piece of PVC pipe or a broom handle with the broom removed. Make the wand about a foot wider than your shoulders. 1. Lie on your back, holding a wand with both hands. Your palms should face down as you hold the wand. 2. Keeping your elbows straight, slowly raise your arms over your head. Raise them until you feel a stretch in your shoulders, upper back, and chest.  3. Hold for 15 to 30 seconds. 4. Repeat 2 to 4 times. Shoulder rotation (lying down)    Note: To make a wand for this exercise, use a piece of PVC pipe or a broom handle with the broom removed. Make the wand about a foot wider than your shoulders. 1. Lie on your back. Hold a wand with both hands with your elbows bent and palms up. 2. Keep your elbows close to your body, and move the wand across your body toward the sore arm. 3. Hold for 8 to 12 seconds. 4. Repeat 2 to 4 times.   Neil Ronquillo climbing (to the side)    Note: Avoid any movement that is straight to your side, and be careful not to arch your back. Your arm should stay about 30 degrees to the front of your side. 1. Stand with your side to a wall so that your fingers can just touch it at an angle about 30 degrees toward the front of your body. 2. Walk the fingers of your injured arm up the wall as high as pain permits. Try not to shrug your shoulder up toward your ear as you move your arm up. 3. Hold that position for a count of at least 15 to 20.  4. Walk your fingers back down to the starting position. 5. Repeat at least 2 to 4 times. Try to reach higher each time. Wall climbing (to the front)    Note: During this stretching exercise, be careful not to arch your back. 1. Face a wall, and stand so your fingers can just touch it. 2. Keeping your shoulder down, walk the fingers of your injured arm up the wall as high as pain permits. (Don't shrug your shoulder up toward your ear.)  3. Hold your arm in that position for at least 15 to 30 seconds. 4. Slowly walk your fingers back down to where you started. 5. Repeat at least 2 to 4 times. Try to reach higher each time. Shoulder blade squeeze    1. Stand with your arms at your sides, and squeeze your shoulder blades together. Do not raise your shoulders up as you squeeze. 2. Hold 6 seconds. 3. Repeat 8 to 12 times. Scapular exercise: Arm reach    1. Lie flat on your back. This exercise is a very slight motion that starts with your arms raised (elbows straight, arms straight). 2. From this position, reach higher toward the mala or ceiling. Keep your elbows straight. All motion should be from your shoulder blade only. 3. Relax your arms back to where you started. 4. Repeat 8 to 12 times. Arm raise to the side    Note: During this strengthening exercise, your arm should stay about 30 degrees to the front of your side.   1. Slowly raise your injured arm to the side, with your thumb facing up. Raise your arm 60 degrees at the most (shoulder level is 90 degrees). 2. Hold the position for 3 to 5 seconds. Then lower your arm back to your side. If you need to, bring your \"good\" arm across your body and place it under the elbow as you lower your injured arm. Use your good arm to keep your injured arm from dropping down too fast.  3. Repeat 8 to 12 times. 4. When you first start out, don't hold any extra weight in your hand. As you get stronger, you may use a 1-pound to 2-pound dumbbell or a small can of food. Shoulder flexor and extensor exercise    Note: These are isometric exercises. That means you contract your muscles without actually moving. · Push forward (flex): Stand facing a wall or doorjamb, about 6 inches or less back. Hold your injured arm against your body. Make a closed fist with your thumb on top. Then gently push your hand forward into the wall with about 25% to 50% of your strength. Don't let your body move backward as you push. Hold for about 6 seconds. Relax for a few seconds. Repeat 8 to 12 times. · Push backward (extend): Stand with your back flat against a wall. Your upper arm should be against the wall, with your elbow bent 90 degrees (your hand straight ahead). Push your elbow gently back against the wall with about 25% to 50% of your strength. Don't let your body move forward as you push. Hold for about 6 seconds. Relax for a few seconds. Repeat 8 to 12 times. Scapular exercise: Wall push-ups    Note: This exercise is best done with your fingers somewhat turned out, rather than straight up and down. 1. Stand facing a wall, about 12 inches to 18 inches away. 2. Place your hands on the wall at shoulder height. 3. Slowly bend your elbows and bring your face to the wall. Keep your back and hips straight. 4. Push back to where you started. 5. Repeat 8 to 12 times. 6. When you can do this exercise against a wall comfortably, you can try it against a counter.  You can then slowly progress to the end of a couch, then to a sturdy chair, and finally to the floor. Scapular exercise: Retraction    Note: For this exercise, you will need elastic exercise material, such as surgical tubing or Thera-Band. 1. Put the band around a solid object at about waist level. (A bedpost will work well.) Each hand should hold an end of the band. 2. With your elbows at your sides and bent to 90 degrees, pull the band back. Your shoulder blades should move toward each other. Then move your arms back where you started. 3. Repeat 8 to 12 times. 4. If you have good range of motion in your shoulders, try this exercise with your arms lifted out to the sides. Keep your elbows at a 90-degree angle. Raise the elastic band up to about shoulder level. Pull the band back to move your shoulder blades toward each other. Then move your arms back where you started. Internal rotator strengthening exercise    1. Start by tying a piece of elastic exercise material to a doorknob. You can use surgical tubing or Thera-Band. 2. Stand or sit with your shoulder relaxed and your elbow bent 90 degrees. Your upper arm should rest comfortably against your side. Squeeze a rolled towel between your elbow and your body for comfort. This will help keep your arm at your side. 3. Hold one end of the elastic band in the hand of the painful arm. 4. Slowly rotate your forearm toward your body until it touches your belly. Slowly move it back to where you started. 5. Keep your elbow and upper arm firmly tucked against the towel roll or at your side. 6. Repeat 8 to 12 times. External rotator strengthening exercise    1. Start by tying a piece of elastic exercise material to a doorknob. You can use surgical tubing or Thera-Band. (You may also hold one end of the band in each hand.)  2. Stand or sit with your shoulder relaxed and your elbow bent 90 degrees. Your upper arm should rest comfortably against your side.  Squeeze a rolled towel between your elbow and your body for comfort. This will help keep your arm at your side. 3. Hold one end of the elastic band with the hand of the painful arm. 4. Start with your forearm across your belly. Slowly rotate the forearm out away from your body. Keep your elbow and upper arm tucked against the towel roll or the side of your body until you begin to feel tightness in your shoulder. Slowly move your arm back to where you started. 5. Repeat 8 to 12 times. Follow-up care is a key part of your treatment and safety. Be sure to make and go to all appointments, and call your doctor if you are having problems. It's also a good idea to know your test results and keep a list of the medicines you take. Where can you learn more? Go to http://jose martin-karrie.info/. Enter Abdullahivicki Linder in the search box to learn more about \"Rotator Cuff: Exercises. \"  Current as of: March 21, 2017  Content Version: 11.3  © 4963-2249 [employer]. Care instructions adapted under license by [employer] (which disclaims liability or warranty for this information). If you have questions about a medical condition or this instruction, always ask your healthcare professional. [address] any warranty or liability for your use of this information. Carpal Tunnel Syndrome: Exercises  Your Care Instructions  Here are some examples of typical rehabilitation exercises for your condition. Start each exercise slowly. Ease off the exercise if you start to have pain. Your doctor or your physical or occupational therapist will tell you when you can start these exercises and which ones will work best for you. How to do the exercises  Note: When you no longer have pain or numbness, you can do exercises to help prevent carpal tunnel syndrome from coming back. Do not do any stretch or movement that is uncomfortable or painful. Warm-up stretches  5.  Rotate your wrist up, down, and from side to side. Repeat 4 times. 6. Stretch your fingers far apart. Relax them, and then stretch them again. Repeat 4 times. 7. Stretch your thumb by pulling it back gently, holding it, and then releasing it. Repeat 4 times. Prayer stretch    5. Start with your palms together in front of your chest just below your chin. 6. Slowly lower your hands toward your waistline, keeping your hands close to your stomach and your palms together until you feel a mild to moderate stretch under your forearms. 7. Hold for at least 15 to 30 seconds. Repeat 2 to 4 times. Wrist flexor stretch    4. Extend your arm in front of you with your palm up. 5. Bend your wrist, pointing your hand toward the floor. 6. With your other hand, gently bend your wrist farther until you feel a mild to moderate stretch in your forearm. 7. Hold for at least 15 to 30 seconds. Repeat 2 to 4 times. Wrist extensor stretch    Repeat steps 1 through 4 of the stretch above, but begin with your extended hand palm down. Follow-up care is a key part of your treatment and safety. Be sure to make and go to all appointments, and call your doctor if you are having problems. It's also a good idea to know your test results and keep a list of the medicines you take. Where can you learn more? Go to http://jose martin-karrie.info/. Enter O075 in the search box to learn more about \"Carpal Tunnel Syndrome: Exercises. \"  Current as of: March 21, 2017  Content Version: 11.3  © 2867-5252 [employer]. Care instructions adapted under license by [employer] (which disclaims liability or warranty for this information). If you have questions about a medical condition or this instruction, always ask your healthcare professional. [place] [address] any warranty or liability for your use of this information.

## 2017-08-31 NOTE — MR AVS SNAPSHOT
Visit Information Date & Time Provider Department Dept. Phone Encounter #  
 8/31/2017  3:00 PM Joseph Polk NP [address] [facility] [phone] [id number] Follow-up Instructions Return in about 8 weeks (around 10/26/2017). Your Appointments 10/25/2017  2:00 PM  
EMG with Twan Radford MD  
[employer] [facility]) Appt Note: NP Sweede/EMG RUE/Order and notes in [facility] [address] [facility] [address] [phone]  
  
   
 [facility] [address] Upcoming Health Maintenance Date Due Pneumococcal 19-64 Medium Risk (1 of 1 - PPSV23) 12/12/1987 DTaP/Tdap/Td series (1 - Tdap) 12/12/1989 PAP AKA CERVICAL CYTOLOGY 12/12/1989 INFLUENZA AGE 9 TO ADULT 8/1/2017 Allergies as of 8/31/2017  Review Complete On: 8/31/2017 By: Osiris Parker LPN No Known Allergies Current Immunizations  Never Reviewed No immunizations on file. Not reviewed this visit You Were Diagnosed With   
  
 Codes Comments Cervical spinal stenosis    -  Primary ICD-10-CM: M48.02 
ICD-9-CM: 723.0 S/P cervical spinal fusion     ICD-10-CM: Z98.1 ICD-9-CM: V45.4 Numbness of right hand     ICD-10-CM: R20.0 ICD-9-CM: 782.0 Right shoulder pain, unspecified chronicity     ICD-10-CM: M25.511 ICD-9-CM: 719.41 Vitals BP Pulse Temp Resp Height(growth percentile) Weight(growth percentile) 108/68 74 97.1 °F (36.2 °C) (Oral) 17 5' 2\" (1.575 m) 191 lb (86.6 kg) SpO2 BMI OB Status Smoking Status 94% 34.93 kg/m2 Hysterectomy Current Every Day Smoker BMI and BSA Data Body Mass Index Body Surface Area 34.93 kg/m 2 1.95 m 2 Preferred Pharmacy Pharmacy Name Phone [id number] [address] [place] [address],[address] [facility] [phone] Your Updated Medication List  
  
   
 This list is accurate as of: 8/31/17  3:42 PM.  Always use your most recent med list.  
  
  
  
  
 acetaminophen-codeine 300-30 mg per tablet Commonly known as:  TYLENOL #3  
TAKE 1 TABLET BY MOUTH EVERY 3-4 HOURS AS NEEDED FOR PAIN  
  
 ALLEGRA-D 12 HOUR  mg Tb12 Generic drug:  fexofenadine-pseudoephedrine Take 1 Tab by mouth every twelve (12) hours. amoxicillin 500 mg Tab TAKE 2 TABLETS BY MOUTH AT BEDTIME TONIGHT,THEN TAKE 1 TABLET BY MOUTH EVERY 6 HOURS UNTIL GONE  
  
 * CHANTIX CONTINUING MONTH YVONNE 1 mg tablet Generic drug:  varenicline Take 1 mg by mouth two (2) times daily (after meals). * CHANTIX STARTING MONTH BOX 0.5 mg (11)- 1 mg (42) Dspk Generic drug:  varenicline AS DIRECTED ORALLY  
  
 cyclobenzaprine 10 mg tablet Commonly known as:  FLEXERIL Take 1 Tab by mouth nightly as needed for Muscle Spasm(s). doxycycline 100 mg tablet Commonly known as:  VIBRA-TABS TAKE 1 TABLET BY MOUTH TWICE A DAY  
  
 gabapentin 600 mg tablet Commonly known as:  NEURONTIN Take 1 Tab by mouth four (4) times daily. * HYDROcodone-acetaminophen 5-325 mg per tablet Commonly known as:  NORCO  
  
 * HYDROcodone-acetaminophen 5-325 mg per tablet Commonly known as:  Ruffus Homme Take 1 Tab by mouth every eight (8) hours as needed for Pain. Max Daily Amount: 3 Tabs. melatonin Tab tablet Take 10 mg by mouth nightly. methylPREDNISolone 4 mg tablet Commonly known as:  Monica Sergio Per dose pack instructions  
  
 oxyCODONE-acetaminophen  mg per tablet Commonly known as:  PERCOCET 10 Take 1 Tab by mouth every six (6) hours as needed. Max Daily Amount: 4 Tabs. WELLBUTRIN  mg SR tablet Generic drug:  buPROPion SR Take 150 mg by mouth two (2) times a day. * Notice: This list has 4 medication(s) that are the same as other medications prescribed for you.  Read the directions carefully, and ask your doctor or other care provider to review them with you. We Performed the Following AMB POC XRAY, SPINE, CERVICAL; 2 OR 3 [50199 CPT(R)] AMB SUPPLY ORDER [[id number] Custom] Comments:  
 Right wrist brace Follow-up Instructions Return in about 8 weeks (around 10/26/2017). To-Do List   
 09/21/2017 Neurology:  EMG ONE EXTREMITY UPPER RT Patient Instructions Rotator Cuff: Exercises Your Care Instructions Here are some examples of typical rehabilitation exercises for your condition. Start each exercise slowly. Ease off the exercise if you start to have pain. Your doctor or physical therapist will tell you when you can start these exercises and which ones will work best for you. How to do the exercises Pendulum swing Note: If you have pain in your back, do not do this exercise. 1. Hold on to a table or the back of a chair with your good arm. Then bend forward a little and let your sore arm hang straight down. This exercise does not use the arm muscles. Rather, use your legs and your hips to create movement that makes your arm swing freely. 2. Use the movement from your hips and legs to guide the slightly swinging arm back and forth like a pendulum (or elephant trunk). Then guide it in circles that start small (about the size of a dinner plate). Make the circles a bit larger each day, as your pain allows. 3. Do this exercise for 5 minutes, 5 to 7 times each day. 4. As you have less pain, try bending over a little farther to do this exercise. This will increase the amount of movement at your shoulder. Posterior stretching exercise 1. Hold the elbow of your injured arm with your other hand. 2. Use your hand to pull your injured arm gently up and across your body. You will feel a gentle stretch across the back of your injured shoulder. 3. Hold for at least 15 to 30 seconds. Then slowly lower your arm. 4. Repeat 2 to 4 times. Up-the-back stretch Note: Your doctor or physical therapist may want you to wait to do this stretch until you have regained most of your range of motion and strength. You can do this stretch in different ways. Hold any of these stretches for at least 15 to 30 seconds. Repeat them 2 to 4 times. 1. Put your hand in your back pocket. Let it rest there to stretch your shoulder. 2. With your other hand, hold your injured arm (palm outward) behind your back by the wrist. Pull your arm up gently to stretch your shoulder. 3. Next, put a towel over your other shoulder. Put the hand of your injured arm behind your back. Now hold the back end of the towel. With the other hand, hold the front end of the towel in front of your body. Pull gently on the front end of the towel. This will bring your hand farther up your back to stretch your shoulder. Overhead stretch 1. Standing about an arm's length away, grasp onto a solid surface. You could use a countertop, a doorknob, or the back of a sturdy chair. 2. With your knees slightly bent, bend forward with your arms straight. Lower your upper body, and let your shoulders stretch. 3. As your shoulders are able to stretch farther, you may need to take a step or two backward. 4. Hold for at least 15 to 30 seconds. Then stand up and relax. If you had stepped back during your stretch, step forward so you can keep your hands on the solid surface. 5. Repeat 2 to 4 times. Shoulder flexion (lying down) Note: To make a wand for this exercise, use a piece of PVC pipe or a broom handle with the broom removed. Make the wand about a foot wider than your shoulders. 1. Lie on your back, holding a wand with both hands. Your palms should face down as you hold the wand. 2. Keeping your elbows straight, slowly raise your arms over your head. Raise them until you feel a stretch in your shoulders, upper back, and chest. 
3. Hold for 15 to 30 seconds. 4. Repeat 2 to 4 times. Shoulder rotation (lying down) Note: To make a wand for this exercise, use a piece of PVC pipe or a broom handle with the broom removed. Make the wand about a foot wider than your shoulders. 1. Lie on your back. Hold a wand with both hands with your elbows bent and palms up. 2. Keep your elbows close to your body, and move the wand across your body toward the sore arm. 3. Hold for 8 to 12 seconds. 4. Repeat 2 to 4 times. Wall climbing (to the side) Note: Avoid any movement that is straight to your side, and be careful not to arch your back. Your arm should stay about 30 degrees to the front of your side. 1. Stand with your side to a wall so that your fingers can just touch it at an angle about 30 degrees toward the front of your body. 2. Walk the fingers of your injured arm up the wall as high as pain permits. Try not to shrug your shoulder up toward your ear as you move your arm up. 3. Hold that position for a count of at least 15 to 20. 
4. Walk your fingers back down to the starting position. 5. Repeat at least 2 to 4 times. Try to reach higher each time. Wall climbing (to the front) Note: During this stretching exercise, be careful not to arch your back. 1. Face a wall, and stand so your fingers can just touch it. 2. Keeping your shoulder down, walk the fingers of your injured arm up the wall as high as pain permits. (Don't shrug your shoulder up toward your ear.) 3. Hold your arm in that position for at least 15 to 30 seconds. 4. Slowly walk your fingers back down to where you started. 5. Repeat at least 2 to 4 times. Try to reach higher each time. Shoulder blade squeeze 1. Stand with your arms at your sides, and squeeze your shoulder blades together. Do not raise your shoulders up as you squeeze. 2. Hold 6 seconds. 3. Repeat 8 to 12 times. Scapular exercise: Arm reach 1. Lie flat on your back.  This exercise is a very slight motion that starts with your arms raised (elbows straight, arms straight). 2. From this position, reach higher toward the mala or ceiling. Keep your elbows straight. All motion should be from your shoulder blade only. 3. Relax your arms back to where you started. 4. Repeat 8 to 12 times. Arm raise to the side Note: During this strengthening exercise, your arm should stay about 30 degrees to the front of your side. 1. Slowly raise your injured arm to the side, with your thumb facing up. Raise your arm 60 degrees at the most (shoulder level is 90 degrees). 2. Hold the position for 3 to 5 seconds. Then lower your arm back to your side. If you need to, bring your \"good\" arm across your body and place it under the elbow as you lower your injured arm. Use your good arm to keep your injured arm from dropping down too fast. 
3. Repeat 8 to 12 times. 4. When you first start out, don't hold any extra weight in your hand. As you get stronger, you may use a 1-pound to 2-pound dumbbell or a small can of food. Shoulder flexor and extensor exercise Note: These are isometric exercises. That means you contract your muscles without actually moving. · Push forward (flex): Stand facing a wall or doorjamb, about 6 inches or less back. Hold your injured arm against your body. Make a closed fist with your thumb on top. Then gently push your hand forward into the wall with about 25% to 50% of your strength. Don't let your body move backward as you push. Hold for about 6 seconds. Relax for a few seconds. Repeat 8 to 12 times. · Push backward (extend): Stand with your back flat against a wall. Your upper arm should be against the wall, with your elbow bent 90 degrees (your hand straight ahead). Push your elbow gently back against the wall with about 25% to 50% of your strength. Don't let your body move forward as you push. Hold for about 6 seconds. Relax for a few seconds. Repeat 8 to 12 times. Scapular exercise: Wall push-ups Note: This exercise is best done with your fingers somewhat turned out, rather than straight up and down. 1. Stand facing a wall, about 12 inches to 18 inches away. 2. Place your hands on the wall at shoulder height. 3. Slowly bend your elbows and bring your face to the wall. Keep your back and hips straight. 4. Push back to where you started. 5. Repeat 8 to 12 times. 6. When you can do this exercise against a wall comfortably, you can try it against a counter. You can then slowly progress to the end of a couch, then to a sturdy chair, and finally to the floor. Scapular exercise: Retraction Note: For this exercise, you will need elastic exercise material, such as surgical tubing or Thera-Band. 1. Put the band around a solid object at about waist level. (A bedpost will work well.) Each hand should hold an end of the band. 2. With your elbows at your sides and bent to 90 degrees, pull the band back. Your shoulder blades should move toward each other. Then move your arms back where you started. 3. Repeat 8 to 12 times. 4. If you have good range of motion in your shoulders, try this exercise with your arms lifted out to the sides. Keep your elbows at a 90-degree angle. Raise the elastic band up to about shoulder level. Pull the band back to move your shoulder blades toward each other. Then move your arms back where you started. Internal rotator strengthening exercise 1. Start by tying a piece of elastic exercise material to a doorknob. You can use surgical tubing or Thera-Band. 2. Stand or sit with your shoulder relaxed and your elbow bent 90 degrees. Your upper arm should rest comfortably against your side. Squeeze a rolled towel between your elbow and your body for comfort. This will help keep your arm at your side. 3. Hold one end of the elastic band in the hand of the painful arm.  
4. Slowly rotate your forearm toward your body until it touches your belly. Slowly move it back to where you started. 5. Keep your elbow and upper arm firmly tucked against the towel roll or at your side. 6. Repeat 8 to 12 times. External rotator strengthening exercise 1. Start by tying a piece of elastic exercise material to a doorknob. You can use surgical tubing or Thera-Band. (You may also hold one end of the band in each hand.) 2. Stand or sit with your shoulder relaxed and your elbow bent 90 degrees. Your upper arm should rest comfortably against your side. Squeeze a rolled towel between your elbow and your body for comfort. This will help keep your arm at your side. 3. Hold one end of the elastic band with the hand of the painful arm. 4. Start with your forearm across your belly. Slowly rotate the forearm out away from your body. Keep your elbow and upper arm tucked against the towel roll or the side of your body until you begin to feel tightness in your shoulder. Slowly move your arm back to where you started. 5. Repeat 8 to 12 times. Follow-up care is a key part of your treatment and safety. Be sure to make and go to all appointments, and call your doctor if you are having problems. It's also a good idea to know your test results and keep a list of the medicines you take. Where can you learn more? Go to http://jose martin-karrie.info/. Enter Tee Martin in the search box to learn more about \"Rotator Cuff: Exercises. \" Current as of: March 21, 2017 Content Version: 11.3 © 5717-2795 [employer], RUNform. Care instructions adapted under license by [employer] (which disclaims liability or warranty for this information). If you have questions about a medical condition or this instruction, always ask your healthcare professional. [place] [address] any warranty or liability for your use of this information. Carpal Tunnel Syndrome: Exercises Your Care Instructions Here are some examples of typical rehabilitation exercises for your condition. Start each exercise slowly. Ease off the exercise if you start to have pain. Your doctor or your physical or occupational therapist will tell you when you can start these exercises and which ones will work best for you. How to do the exercises Note: When you no longer have pain or numbness, you can do exercises to help prevent carpal tunnel syndrome from coming back. Do not do any stretch or movement that is uncomfortable or painful. Warm-up stretches 5. Rotate your wrist up, down, and from side to side. Repeat 4 times. 6. Stretch your fingers far apart. Relax them, and then stretch them again. Repeat 4 times. 7. Stretch your thumb by pulling it back gently, holding it, and then releasing it. Repeat 4 times. Prayer stretch 5. Start with your palms together in front of your chest just below your chin. 6. Slowly lower your hands toward your waistline, keeping your hands close to your stomach and your palms together until you feel a mild to moderate stretch under your forearms. 7. Hold for at least 15 to 30 seconds. Repeat 2 to 4 times. Wrist flexor stretch 4. Extend your arm in front of you with your palm up. 5. Bend your wrist, pointing your hand toward the floor. 6. With your other hand, gently bend your wrist farther until you feel a mild to moderate stretch in your forearm. 7. Hold for at least 15 to 30 seconds. Repeat 2 to 4 times. Wrist extensor stretch Repeat steps 1 through 4 of the stretch above, but begin with your extended hand palm down. Follow-up care is a key part of your treatment and safety. Be sure to make and go to all appointments, and call your doctor if you are having problems. It's also a good idea to know your test results and keep a list of the medicines you take. Where can you learn more? Go to http://jose martin-karrie.info/. Enter S183 in the search box to learn more about \"Carpal Tunnel Syndrome: Exercises. \" Current as of: March 21, 2017 Content Version: 11.3 © 5190-1550 [employer]angle. Care instructions adapted under license by [employer] (which disclaims liability or warranty for this information). If you have questions about a medical condition or this instruction, always ask your healthcare professional. [address] any warranty or liability for your use of this information. Introducing [facility] & HEALTH SERVICES! Dear Ashley Records: Thank you for requesting a [employer] account. Our records indicate that you already have an active [employer] account. You can access your account anytime at [email or web address] Did you know that you can access your hospital and ER discharge instructions at any time in [employer]? You can also review all of your test results from your hospital stay or ER visit. Additional Information If you have questions, please visit the Frequently Asked Questions section of the [employer] website at [email or web address] Remember, [employer] is NOT to be used for urgent needs. For medical emergencies, dial 911. Now available from your iPhone and Android! Please provide this summary of care documentation to your next provider. Your primary care clinician is listed as Dharmesh Khanna. If you have any questions after today's visit, please call [phone].

## 2017-10-25 ENCOUNTER — OFFICE VISIT (OUTPATIENT)
Dept: NEUROLOGY | Age: 47
End: 2017-10-25

## 2017-10-25 ENCOUNTER — HOSPITAL ENCOUNTER (OUTPATIENT)
Dept: MAMMOGRAPHY | Age: 47
Discharge: HOME | End: 2017-10-25
Attending: OBSTETRICS & GYNECOLOGY
Payer: COMMERCIAL

## 2017-10-25 DIAGNOSIS — Z12.31 VISIT FOR SCREENING MAMMOGRAM: ICD-10-CM

## 2017-10-25 DIAGNOSIS — G56.03 BILATERAL CARPAL TUNNEL SYNDROME: Primary | ICD-10-CM

## 2017-10-25 PROCEDURE — 77067 SCR MAMMO BI INCL CAD: CPT

## 2017-10-25 NOTE — COMMUNICATION BODY
[facility]  [address] [facility] [address], [address] [phone]      [facility] [address]    Neurophysiology Report      Patient: Rosa Rebolledo     ID: [id number] Physician: Bonnie Alvarado MD   Gender: Male Ref Phys: MARRY Hodge   Handedness:      Study Date: October 25, 2017         Patient History: This 39-year-old woman has undergone cervical fusion in February 2017. She still has pain and weakness in the right upper extremity. On brief exam she has some weakness of  on the right side. Sensation is intact.   Reflexes are 2+ and symmetrical.      Nerve Conduction Studies  Anti Sensory Summary Table     Stim Site NR Peak (ms) Norm Peak (ms) O-P Amp (µV) Norm O-P Amp Dist (cm) Stewart (m/s)   Left Median 2nd Digit Anti Sensory (2nd Digit)   Wrist    3.8 <3.5 27.6 >20 13.0 43.3   Site 2    3.9  26.5      Right Median 2nd Digit Anti Sensory (2nd Digit)   Wrist    5.6 <3.5 25.6 >20 13.0 61.9   Site 2    5.5  14.6      Left Ulnar Anti Sensory (5th Digit )   Wrist    2.7 <3.1 46.3 >17.0 11.0 57.9   Site 2    2.6  46.0      Right Ulnar Anti Sensory (5th Digit )   Wrist    2.6 <3.1 40.4 >17.0 11.0 57.9   Site 2    2.6  41.1        Motor Summary Table     Stim Site NR Onset (ms) Norm Onset (ms) O-P Amp (mV) Norm O-P Amp Dist (cm) Stewart (m/s) Norm Stewart (m/s)   Left Median Motor (Abd Poll Brev)   Wrist    3.1 <4.4 6.4 >4.0 22.0 52.4 >49   Elbow    7.3  6.2       Right Median Motor (Abd Poll Brev)   Wrist    6.1 <4.4 9.2 >4.0 22.0 53.7 >49   Elbow    10.2  8.8       Left Ulnar Motor (Abd Dig Minimi )   Wrist    2.0 <3.3 6.8 >6.0 16.0 57.1 >49   B Elbow    4.8  6.7  17.0 68.0 >50   A Elbow    7.3  7.0       Right Ulnar Motor (Abd Dig Minimi )   Wrist    2.0 <3.3 7.5 >6.0 16.0 61.5 >49   B Elbow    4.6  6.8  15.0 60.0 >50   A Elbow    7.1  7.1           EMG     Side Muscle Nerve Root Ins Act Fibs Psw Fasc Amp Dur Poly Recrt Int Elise Prader Comment   Right Deltoid Axillary C5-6 Nml Nml Nml None Nml Nml 0 Nml Nml    Right Biceps Musculocut C5-6 Nml Nml Nml None Nml Nml 0 Nml Nml    Right Triceps Radial C6-7-8 Nml Nml Nml None Nml Nml 0 Nml Nml    Right FlexCarRad Median C6-7 Nml Nml Nml None Nml Nml 0 Nml Nml    Right 1stDorInt Ulnar C8-T1 Nml Nml Nml None Nml Nml 0 Nml Nml    Right Abd Poll Brev Median C8-T1 Nml Nml Nml None Nml Nml 0 Nml Nml    Right Cervical Parasp Up Rami C1-3 Nml Nml Nml          Right Cervical Parasp Mid Rami C4-6 Nml Nml Nml          Right Cervical Parasp Low Rami C7-8 Nml Nml Nml            NCS/EMG FINDINGS:     Evaluation of the Left median motor, the Left ulnar motor, the Right ulnar motor, the Left ulnar sensory, and the Right ulnar sensory nerves were unremarkable.  The Right median motor nerve showed prolonged distal onset latency (6.1 ms).  The Left Median 2nd Digit sensory nerve showed prolonged distal peak latency (3.8 ms) and decreased conduction velocity (Wrist-2nd Digit, 43.3 m/s).  The Right Median 2nd Digit sensory nerve showed prolonged distal peak latency (5.6 ms). INTERPRETATION: This was an abnormal nerve conduction and EMG study showing there to be severe prolongation of the distal latency in the right median nerve this is consistent with severe right-sided carpal tunnel syndrome. There was a subtle degree of prolongation on the left side consistent with subtle left sided carpal tunnel syndrome. No sign of radiculopathy was identified.       ___________________________  Elizabeth Sanchez MD          Waveforms:

## 2017-10-25 NOTE — PROGRESS NOTES
[facility]  [address] [facility], [address]  [phone]      [place] [address]    Neurophysiology Report      Patient: John Molina     ID: [id number] Physician: Ivett Perez MD   Gender: Male Ref Phys: MARRY Saavedra   Handedness:      Study Date: October 25, 2017         Patient History: This 78-year-old woman has undergone cervical fusion in February 2017. She still has pain and weakness in the right upper extremity. On brief exam she has some weakness of  on the right side. Sensation is intact.   Reflexes are 2+ and symmetrical.      Nerve Conduction Studies  Anti Sensory Summary Table     Stim Site NR Peak (ms) Norm Peak (ms) O-P Amp (µV) Norm O-P Amp Dist (cm) Stewart (m/s)   Left Median 2nd Digit Anti Sensory (2nd Digit)   Wrist    3.8 <3.5 27.6 >20 13.0 43.3   Site 2    3.9  26.5      Right Median 2nd Digit Anti Sensory (2nd Digit)   Wrist    5.6 <3.5 25.6 >20 13.0 61.9   Site 2    5.5  14.6      Left Ulnar Anti Sensory (5th Digit )   Wrist    2.7 <3.1 46.3 >17.0 11.0 57.9   Site 2    2.6  46.0      Right Ulnar Anti Sensory (5th Digit )   Wrist    2.6 <3.1 40.4 >17.0 11.0 57.9   Site 2    2.6  41.1        Motor Summary Table     Stim Site NR Onset (ms) Norm Onset (ms) O-P Amp (mV) Norm O-P Amp Dist (cm) Stewart (m/s) Norm Stewart (m/s)   Left Median Motor (Abd Poll Brev)   Wrist    3.1 <4.4 6.4 >4.0 22.0 52.4 >49   Elbow    7.3  6.2       Right Median Motor (Abd Poll Brev)   Wrist    6.1 <4.4 9.2 >4.0 22.0 53.7 >49   Elbow    10.2  8.8       Left Ulnar Motor (Abd Dig Minimi )   Wrist    2.0 <3.3 6.8 >6.0 16.0 57.1 >49   B Elbow    4.8  6.7  17.0 68.0 >50   A Elbow    7.3  7.0       Right Ulnar Motor (Abd Dig Minimi )   Wrist    2.0 <3.3 7.5 >6.0 16.0 61.5 >49   B Elbow    4.6  6.8  15.0 60.0 >50   A Elbow    7.1  7.1           EMG     Side Muscle Nerve Root Ins Act Fibs Psw Fasc Amp Dur Poly Recrt Int Kg Folk Comment   Right Deltoid Axillary C5-6 Nml Nml Nml None Nml Nml 0 Nml Nml    Right Biceps Musculocut C5-6 Nml Nml Nml None Nml Nml 0 Nml Nml    Right Triceps Radial C6-7-8 Nml Nml Nml None Nml Nml 0 Nml Nml    Right FlexCarRad Median C6-7 Nml Nml Nml None Nml Nml 0 Nml Nml    Right 1stDorInt Ulnar C8-T1 Nml Nml Nml None Nml Nml 0 Nml Nml    Right Abd Poll Brev Median C8-T1 Nml Nml Nml None Nml Nml 0 Nml Nml    Right Cervical Parasp Up Rami C1-3 Nml Nml Nml          Right Cervical Parasp Mid Rami C4-6 Nml Nml Nml          Right Cervical Parasp Low Rami C7-8 Nml Nml Nml            NCS/EMG FINDINGS:     Evaluation of the Left median motor, the Left ulnar motor, the Right ulnar motor, the Left ulnar sensory, and the Right ulnar sensory nerves were unremarkable.  The Right median motor nerve showed prolonged distal onset latency (6.1 ms).  The Left Median 2nd Digit sensory nerve showed prolonged distal peak latency (3.8 ms) and decreased conduction velocity (Wrist-2nd Digit, 43.3 m/s).  The Right Median 2nd Digit sensory nerve showed prolonged distal peak latency (5.6 ms). INTERPRETATION: This was an abnormal nerve conduction and EMG study showing there to be severe prolongation of the distal latency in the right median nerve this is consistent with severe right-sided carpal tunnel syndrome. There was a subtle degree of prolongation on the left side consistent with subtle left sided carpal tunnel syndrome. No sign of radiculopathy was identified.       ___________________________  Johnathan Cordoba MD          Waveforms:                      [address] AT [facility]  OFFICE PROCEDURE PROGRESS NOTE        Chart reviewed for the following:   Hong Quick MD, have reviewed the History, Physical and updated the Allergic reactions for [facility] performed immediately prior to start of procedure:   Hong Quick MD, have performed the following reviews on Hair Sick prior to the start of the procedure:            * Patient was identified by name and date of birth   * Agreement on procedure being performed was verified  * Risks and Benefits explained to the patient  * Procedure site verified and marked as necessary  * Patient was positioned for comfort  * Consent was signed and verified     Time: 2:34 PM    Date of procedure: 10/25/2017    Procedure performed by:  Nanci Elmore MD    Patient assisted by: self    How tolerated by patient: tolerated the procedure well with no complications    Post Procedural Pain Scale: 0 - No Hurt    Comments: none

## 2017-10-25 NOTE — LETTER
10/25/2017 2:35 PM 
 
Patient:  Yani Kwok YOB: 1970 Date of Visit: 10/25/2017 Dear Rebecca Manning MD 
[address] [place] [address] VIA Facsimile: [phone] Elda Garcia NP 
[facility] [address] [facility] [address] VIA In Basket 
 : Thank you for referring Ms. Yani Kwok to me for evaluation/treatment. Below are the relevant portions of my assessment and plan of care. [facility] [address], [address] 
[phone]      [place] [address] Neurophysiology Report Patient: Yani Kwok ID: [id number] Physician: Noah Luther MD  
Gender: Male Ref Phys: MARRY Javier Handedness:     
Study Date: October 25, 2017 Patient History: This 75-year-old woman has undergone cervical fusion in February 2017. She still has pain and weakness in the right upper extremity. On brief exam she has some weakness of  on the right side. Sensation is intact. Reflexes are 2+ and symmetrical. 
 
 
Nerve Conduction Studies Anti Sensory Summary Table Stim Site NR Peak (ms) Norm Peak (ms) O-P Amp (µV) Norm O-P Amp Dist (cm) Stewart (m/s) Left Median 2nd Digit Anti Sensory (2nd Digit) Wrist    3.8 <3.5 27.6 >20 13.0 43.3 Site 2    3.9  26.5 Right Median 2nd Digit Anti Sensory (2nd Digit) Wrist    5.6 <3.5 25.6 >20 13.0 61.9 Site 2    5.5  14.6 Left Ulnar Anti Sensory (5th Digit ) Wrist    2.7 <3.1 46.3 >17.0 11.0 57.9 Site 2    2.6  46.0 Right Ulnar Anti Sensory (5th Digit ) Wrist    2.6 <3.1 40.4 >17.0 11.0 57.9 Site 2    2.6  41.1 Motor Summary Table Stim Site NR Onset (ms) Norm Onset (ms) O-P Amp (mV) Norm O-P Amp Dist (cm) Stewart (m/s) Norm Stewart (m/s) Left Median Motor (Abd Poll Brev) Wrist    3.1 <4.4 6.4 >4.0 22.0 52.4 >49 Elbow    7.3  6.2 Right Median Motor (Abd Poll Brev) Wrist    6.1 <4.4 9.2 >4.0 22.0 53.7 >49 Elbow    10.2  8.8 Left Ulnar Motor (Abd Dig Minimi ) Wrist    2.0 <3.3 6.8 >6.0 16.0 57.1 >49 B Elbow    4.8  6.7  17.0 68.0 >50 A Elbow    7.3  7.0 Right Ulnar Motor (Abd Dig Minimi ) Wrist    2.0 <3.3 7.5 >6.0 16.0 61.5 >49 B Elbow    4.6  6.8  15.0 60.0 >50 A Elbow    7.1  7.1 EMG Side Muscle Nerve Root Ins Act Fibs Psw Fasc Amp Dur Poly Recrt Int Edison Back Comment Right Deltoid Axillary C5-6 Nml Nml Nml None Nml Nml 0 Nml Nml Right Biceps Musculocut C5-6 Nml Nml Nml None Nml Nml 0 Nml Nml Right Triceps Radial C6-7-8 Nml Nml Nml None Nml Nml 0 Nml Nml Right FlexCarRad Median C6-7 Nml Nml Nml None Nml Nml 0 Nml Nml Right 1stDorInt Ulnar C8-T1 Nml Nml Nml None Nml Nml 0 Nml Nml Right Abd Poll Brev Median C8-T1 Nml Nml Nml None Nml Nml 0 Nml Nml Right Cervical Parasp Up Rami C1-3 Nml Nml Nml Right Cervical Parasp Mid Rami C4-6 Nml Nml Nml Right Cervical Parasp Low Rami C7-8 Nml Nml Nml NCS/EMG FINDINGS: 
 
? Evaluation of the Left median motor, the Left ulnar motor, the Right ulnar motor, the Left ulnar sensory, and the Right ulnar sensory nerves were unremarkable. ? The Right median motor nerve showed prolonged distal onset latency (6.1 ms). ? The Left Median 2nd Digit sensory nerve showed prolonged distal peak latency (3.8 ms) and decreased conduction velocity (Wrist-2nd Digit, 43.3 m/s). ? The Right Median 2nd Digit sensory nerve showed prolonged distal peak latency (5.6 ms). INTERPRETATION: This was an abnormal nerve conduction and EMG study showing there to be severe prolongation of the distal latency in the right median nerve this is consistent with severe right-sided carpal tunnel syndrome. There was a subtle degree of prolongation on the left side consistent with subtle left sided carpal tunnel syndrome. No sign of radiculopathy was identified. ___________________________ Amrit Ramírez MD 
 
 
 
 
Waveforms: If you have questions, please do not hesitate to call me. I look forward to following MsGrazyna Sagastume along with you.  
 
 
 
Sincerely, 
 
 
Keshav Brantley MD

## 2017-11-01 ENCOUNTER — HOSPITAL ENCOUNTER (OUTPATIENT)
Dept: ULTRASOUND IMAGING | Age: 47
Discharge: HOME | End: 2017-11-01
Attending: OBSTETRICS & GYNECOLOGY
Payer: COMMERCIAL

## 2017-11-01 ENCOUNTER — HOSPITAL ENCOUNTER (OUTPATIENT)
Dept: MAMMOGRAPHY | Age: 47
Discharge: HOME | End: 2017-11-01
Attending: OBSTETRICS & GYNECOLOGY
Payer: COMMERCIAL

## 2017-11-01 DIAGNOSIS — R92.8 ABNORMAL MAMMOGRAM: ICD-10-CM

## 2017-11-01 PROCEDURE — 76642 ULTRASOUND BREAST LIMITED: CPT

## 2017-11-01 PROCEDURE — 77065 DX MAMMO INCL CAD UNI: CPT

## 2017-11-07 ENCOUNTER — OFFICE VISIT (OUTPATIENT)
Dept: ORTHOPEDIC SURGERY | Age: 47
End: 2017-11-07

## 2017-11-07 VITALS
WEIGHT: 193.4 LBS | HEART RATE: 80 BPM | SYSTOLIC BLOOD PRESSURE: 122 MMHG | TEMPERATURE: 98.1 F | HEIGHT: 62 IN | RESPIRATION RATE: 18 BRPM | OXYGEN SATURATION: 96 % | DIASTOLIC BLOOD PRESSURE: 72 MMHG | BODY MASS INDEX: 35.59 KG/M2

## 2017-11-07 DIAGNOSIS — Z98.1 S/P CERVICAL SPINAL FUSION: ICD-10-CM

## 2017-11-07 DIAGNOSIS — M77.12 LATERAL EPICONDYLITIS OF LEFT ELBOW: ICD-10-CM

## 2017-11-07 DIAGNOSIS — G56.03 BILATERAL CARPAL TUNNEL SYNDROME: Primary | ICD-10-CM

## 2017-11-07 RX ORDER — DULOXETIN HYDROCHLORIDE 30 MG/1
30 CAPSULE, DELAYED RELEASE ORAL DAILY
Qty: 60 CAP | Refills: 1 | Status: CANCELLED | OUTPATIENT
Start: 2017-11-07

## 2017-11-07 NOTE — PROGRESS NOTES
[address]  [address], [address]  [place], [address] [place] [address] Street  Phone: [phone]  Fax: [phone]  PROGRESS NOTE  Patient: Niki Melton                MRN: [id number]       SSN: [id number]  YOB: 1970        AGE: 48 y.o. SEX: female  Body mass index is 35.37 kg/(m^2). PCP: Marcia Erwin MD  11/07/17    Chief Complaint   Patient presents with    Neck Pain     EMG follow up       HISTORY OF PRESENT ILLNESS, RADIOGRAPHS, and PLAN:     HISTORY:  Ms. Cortney Rai returns today. She is about nine months out from her cervical decompression and fusion, and that is doing well. She has two major complaints. She has some extensor tendinitis in her left elbow and right hand symptoms, which her EMG demonstrates as being carpal tunnel syndrome. EMG demonstrates no symptoms referable to her neck. She is otherwise doing well. ASSESSMENT/PLAN: She would like to speak to a hand specialist or an orthopedist to talk about carpal tunnel release, and we will have her see Dr. Claude Sake, who she has seen before to discuss both her elbow and her carpal tunnel. I will see her back again for routing followup as-needed. cc: Vane Hyde M.D. Past Medical History:   Diagnosis Date    Psychiatric disorder     depression       Family History   Problem Relation Age of Onset    No Known Problems Mother     No Known Problems Father     No Known Problems Sister     No Known Problems Brother        Current Outpatient Prescriptions   Medication Sig Dispense Refill    cyclobenzaprine (FLEXERIL) 10 mg tablet Take 1 Tab by mouth nightly as needed for Muscle Spasm(s). 30 Tab 2    fexofenadine-pseudoephedrine (ALLEGRA-D 12 HOUR)  mg Tb12 Take 1 Tab by mouth every twelve (12) hours.  melatonin tab tablet Take 10 mg by mouth nightly.       doxycycline (VIBRA-TABS) 100 mg tablet TAKE 1 TABLET BY MOUTH TWICE A DAY  0    acetaminophen-codeine (TYLENOL #3) 300-30 mg per tablet TAKE 1 TABLET BY MOUTH EVERY 3-4 HOURS AS NEEDED FOR PAIN  0    amoxicillin 500 mg tab TAKE 2 TABLETS BY MOUTH AT BEDTIME TONIGHT,THEN TAKE 1 TABLET BY MOUTH EVERY 6 HOURS UNTIL GONE  0    CHANTIX STARTING MONTH BOX 0.5 mg (11)- 1 mg (42) DsPk AS DIRECTED ORALLY  0    HYDROcodone-acetaminophen (NORCO) 5-325 mg per tablet Take 1 Tab by mouth every eight (8) hours as needed for Pain. Max Daily Amount: 3 Tabs. 90 Tab 0    methylPREDNISolone (MEDROL DOSEPACK) 4 mg tablet Per dose pack instructions 21 Tab 0    oxyCODONE-acetaminophen (PERCOCET 10)  mg per tablet Take 1 Tab by mouth every six (6) hours as needed. Max Daily Amount: 4 Tabs. (Patient not taking: Reported on 3/9/2017) 40 Tab 0    varenicline (CHANTIX CONTINUING MONTH PAK) 1 mg tablet Take 1 mg by mouth two (2) times daily (after meals).  HYDROcodone-acetaminophen (NORCO) 5-325 mg per tablet       gabapentin (NEURONTIN) 600 mg tablet Take 1 Tab by mouth four (4) times daily. 120 Tab 1    buPROPion SR (WELLBUTRIN SR) 150 mg SR tablet Take 150 mg by mouth two (2) times a day. No Known Allergies    Past Surgical History:   Procedure Laterality Date    HX GYN      hysterectomy    HX GYN      c/s       Past Medical History:   Diagnosis Date    Psychiatric disorder     depression       Social History     Social History    Marital status:      Spouse name: N/A    Number of children: N/A    Years of education: N/A     Occupational History    Not on file. Social History Main Topics    Smoking status: Current Every Day Smoker     Packs/day: 0.50    Smokeless tobacco: Never Used    Alcohol use Yes      Comment: socially    Drug use: Not on file    Sexual activity: Not on file     Other Topics Concern    Not on file     Social History Narrative         REVIEW OF SYSTEMS:   CONSTITUTIONAL SYMPTOMS:  Negative. EYES:  Negative.    EARS, NOSE, THROAT AND MOUTH: Negative. CARDIOVASCULAR:  Negative. RESPIRATORY:  Negative. GENITOURINARY: Negative. GASTROINTESTINAL:  Negative. INTEGUMENTARY (SKIN AND/OR BREAST):  Negative. MUSCULOSKELETAL: Per HPI.   ENDOCRINE/RHEUMATOLOGIC:  Negative. NEUROLOGICAL:  Per HPI. HEMATOLOGIC/LYMPHATIC:  Negative. ALLERGIC/IMMUNOLOGIC:  Negative. PSYCHIATRIC:  Negative. PHYSICAL EXAMINATION:   Visit Vitals    /72    Pulse 80    Temp 98.1 °F (36.7 °C) (Oral)    Resp 18    Ht 5' 2\" (1.575 m)    Wt 193 lb 6.4 oz (87.7 kg)    SpO2 96%    BMI 35.37 kg/m2    PAIN SCALE: 0 - No pain/10    CONSTITUTIONAL: The patient is in no apparent distress and is alert and oriented x 3. HEENT: Normocephalic. Hearing grossly intact. NECK: Supple and symmetric. no tenderness, or masses were felt. RESPIRATORY: No labored breathing. CARDIOVASCULAR: The carotid pulses were normal. Peripheral pulses were 2+. CHEST: Normal AP diameter and normal contour without any kyphoscoliosis. LYMPHATIC: No lymphadenopathy was appreciated in the neck, axillae or groin. SKIN:  Negative for scars, rashes, lesions, or ulcers on the right upper, right lower, left upper, left lower and trunk. NEUROLOGICAL: Alert and oriented x 3. Ambulation without assistive device. FWB. EXTREMITIES: See musculoskeletal.  MUSCULOSKELETAL:   Head and Neck:  Negative for misalignment, asymmetry, crepitation, defects, tenderness masses or effusions.  Left Upper Extremity: Weakness. Pain in elbow. Inspection, percussion and palpation preformed. Boyles sign is negative.  Right Upper Extremity: Weakness. R trapezius pain. Numbness. Inspection, percussion and palpation preformed. Boyles sign is negative.  Spine, Ribs and Pelvis: Inspection, percussion and palpation preformed. Negative for misalignment, asymmetry, crepitation, defects, tenderness masses or effusions.  Left Lower Extremity: Inspection, percussion and palpation preformed.    Negative straight leg raise.  Right Lower Extremity: Inspection, percussion and palpation preformed. Negative straight leg raise. SPINE EXAM:     Cervical spine: Neck is midline. Normal muscle tone. No focal atrophy is noted. ASSESSMENT    ICD-10-CM ICD-9-CM    1. Bilateral carpal tunnel syndrome G56.03 354.0 REFERRAL TO ORTHOPEDICS   2. S/P cervical spinal fusion Z98.1 V45.4    3. Lateral epicondylitis of left elbow M77.12 726.32 REFERRAL TO ORTHOPEDICS       Written by Idalia Ibrahim, as dictated by Elton Anderson MD.    I, Dr. Elton Anderson MD, confirm that all documentation is accurate.

## 2017-11-07 NOTE — MR AVS SNAPSHOT
Visit Information Date & Time Provider Department Dept. Phone Encounter #  
 11/7/2017  8:00 AM Cheri Lezama MD [facility], [address] and [facility] [phone] [id number] Follow-up Instructions Return if symptoms worsen or fail to improve. Follow-up and Disposition History Upcoming Health Maintenance Date Due Pneumococcal 19-64 Medium Risk (1 of 1 - PPSV23) 12/12/1987 DTaP/Tdap/Td series (1 - Tdap) 12/12/1989 PAP AKA CERVICAL CYTOLOGY 12/12/1989 INFLUENZA AGE 9 TO ADULT 8/1/2017 Allergies as of 11/7/2017  Review Complete On: 11/7/2017 By: Cheri Lezama MD  
 No Known Allergies Current Immunizations  Never Reviewed No immunizations on file. Not reviewed this visit You Were Diagnosed With   
  
 Codes Comments Bilateral carpal tunnel syndrome    -  Primary ICD-10-CM: G56.03 
ICD-9-CM: 354.0 S/P cervical spinal fusion     ICD-10-CM: Z98.1 ICD-9-CM: V45.4 Lateral epicondylitis of left elbow     ICD-10-CM: M77.12 
ICD-9-CM: 726.32 Vitals BP Pulse Temp Resp Height(growth percentile) Weight(growth percentile) 122/72 80 98.1 °F (36.7 °C) (Oral) 18 5' 2\" (1.575 m) 193 lb 6.4 oz (87.7 kg) SpO2 BMI OB Status Smoking Status 96% 35.37 kg/m2 Hysterectomy Current Every Day Smoker BMI and BSA Data Body Mass Index Body Surface Area  
 35.37 kg/m 2 1.96 m 2 Preferred Pharmacy Pharmacy Name Phone [id number] [address] [facility] [phone] Your Updated Medication List  
  
   
This list is accurate as of: 11/7/17  9:14 AM.  Always use your most recent med list.  
  
  
  
  
 acetaminophen-codeine 300-30 mg per tablet Commonly known as:  TYLENOL #3  
TAKE 1 TABLET BY MOUTH EVERY 3-4 HOURS AS NEEDED FOR PAIN  
  
 ALLEGRA-D 12 HOUR  mg Tb12 Generic drug:  fexofenadine-pseudoephedrine Take 1 Tab by mouth every twelve (12) hours. amoxicillin 500 mg Tab TAKE 2 TABLETS BY MOUTH AT BEDTIME TONIGHT,THEN TAKE 1 TABLET BY MOUTH EVERY 6 HOURS UNTIL GONE  
  
 * CHANTIX CONTINUING MONTH YVONNE 1 mg tablet Generic drug:  varenicline Take 1 mg by mouth two (2) times daily (after meals). * CHANTIX STARTING MONTH BOX 0.5 mg (11)- 1 mg (42) Dspk Generic drug:  varenicline AS DIRECTED ORALLY  
  
 cyclobenzaprine 10 mg tablet Commonly known as:  FLEXERIL Take 1 Tab by mouth nightly as needed for Muscle Spasm(s). doxycycline 100 mg tablet Commonly known as:  VIBRA-TABS TAKE 1 TABLET BY MOUTH TWICE A DAY  
  
 gabapentin 600 mg tablet Commonly known as:  NEURONTIN Take 1 Tab by mouth four (4) times daily. * HYDROcodone-acetaminophen 5-325 mg per tablet Commonly known as:  NORCO  
  
 * HYDROcodone-acetaminophen 5-325 mg per tablet Commonly known as:  Nelma Farhad Take 1 Tab by mouth every eight (8) hours as needed for Pain. Max Daily Amount: 3 Tabs. melatonin Tab tablet Take 10 mg by mouth nightly. methylPREDNISolone 4 mg tablet Commonly known as:  Emilee Labella Per dose pack instructions  
  
 oxyCODONE-acetaminophen  mg per tablet Commonly known as:  PERCOCET 10 Take 1 Tab by mouth every six (6) hours as needed. Max Daily Amount: 4 Tabs. WELLBUTRIN  mg SR tablet Generic drug:  buPROPion SR Take 150 mg by mouth two (2) times a day. * Notice: This list has 4 medication(s) that are the same as other medications prescribed for you. Read the directions carefully, and ask your doctor or other care provider to review them with you. We Performed the Following REFERRAL TO ORTHOPEDICS [[id number] Custom] Comments:  
 eval for surgery for CTS and tennis elbow Follow-up Instructions Return if symptoms worsen or fail to improve. Referral Information  Referral ID Referred By Referred To  
  
 [id number] Josr Ly MD   
 [address] [place], [address] Phone: [phone] Fax: [phone] Visits Status Start Date End Date 1 New Request 11/7/17 11/7/18 If your referral has a status of pending review or denied, additional information will be sent to support the outcome of this decision. Introducing [facility]! Dear Nessa Nascimento: Thank you for requesting a [employer] account. Our records indicate that you already have an active [employer] account. You can access your account anytime at [email or web address] Did you know that you can access your hospital and ER discharge instructions at any time in [employer]? You can also review all of your test results from your hospital stay or ER visit. Additional Information If you have questions, please visit the Frequently Asked Questions section of the [employer] website at [email or web address] Remember, [employer] is NOT to be used for urgent needs. For medical emergencies, dial 911. Now available from your iPhone and Android! Please provide this summary of care documentation to your next provider. Your primary care clinician is listed as Dharmesh Khanna. If you have any questions after today's visit, please call [phone].

## 2017-12-20 ENCOUNTER — OFFICE VISIT (OUTPATIENT)
Dept: ORTHOPEDIC SURGERY | Age: 47
End: 2017-12-20

## 2017-12-20 VITALS
BODY MASS INDEX: 36.8 KG/M2 | WEIGHT: 200 LBS | DIASTOLIC BLOOD PRESSURE: 80 MMHG | HEART RATE: 97 BPM | SYSTOLIC BLOOD PRESSURE: 129 MMHG | OXYGEN SATURATION: 98 % | TEMPERATURE: 96.9 F | HEIGHT: 62 IN

## 2017-12-20 DIAGNOSIS — G56.03 BILATERAL CARPAL TUNNEL SYNDROME: Primary | ICD-10-CM

## 2017-12-20 NOTE — PROGRESS NOTES
Ernesto Street  1970   Chief Complaint   Patient presents with    Hand Pain     bilat    Elbow Pain     left        HISTORY OF PRESENT ILLNESS  Ernesto Street is a 50 y.o. female who presents today for evaluation of b/l  carpal tunnel syndrome R>L. she rates her pain 0/10 today. Pain has been present for quite awhile. Patient describes the pain as numbing and tingling that is Intermittent in nature. Symptoms are worse with certain motions of the wrists and hands, Activity and is better with  Rest. Associated symptoms include numbness, tingling, dropping things, unable to . Since problem started, it: has worsened. Pain does not wake patient up at night. Has taken no recent medications for the problem. Patient has an additional complaint of left elbow pain that is worse with grabbing like lifting children. Has tried following treatments: Injections:NO; Brace:NO; Therapy:NO; Cane/Crutch:NO       No Known Allergies     Past Medical History:   Diagnosis Date    Psychiatric disorder     depression      Social History     Social History    Marital status:      Spouse name: N/A    Number of children: N/A    Years of education: N/A     Occupational History    Not on file.      Social History Main Topics    Smoking status: Current Every Day Smoker     Packs/day: 0.50    Smokeless tobacco: Never Used    Alcohol use Yes      Comment: socially    Drug use: Not on file    Sexual activity: Not on file     Other Topics Concern    Not on file     Social History Narrative      Past Surgical History:   Procedure Laterality Date    HX GYN      hysterectomy    HX GYN      c/s      Family History   Problem Relation Age of Onset    No Known Problems Mother     No Known Problems Father     No Known Problems Sister     No Known Problems Brother       Current Outpatient Prescriptions   Medication Sig    cyclobenzaprine (FLEXERIL) 10 mg tablet Take 1 Tab by mouth nightly as needed for Muscle Spasm(s).  doxycycline (VIBRA-TABS) 100 mg tablet TAKE 1 TABLET BY MOUTH TWICE A DAY    acetaminophen-codeine (TYLENOL #3) 300-30 mg per tablet TAKE 1 TABLET BY MOUTH EVERY 3-4 HOURS AS NEEDED FOR PAIN    amoxicillin 500 mg tab TAKE 2 TABLETS BY MOUTH AT BEDTIME TONIGHT,THEN TAKE 1 TABLET BY MOUTH EVERY 6 HOURS UNTIL GONE    CHANTIX STARTING MONTH BOX 0.5 mg (11)- 1 mg (42) DsPk AS DIRECTED ORALLY    HYDROcodone-acetaminophen (NORCO) 5-325 mg per tablet Take 1 Tab by mouth every eight (8) hours as needed for Pain. Max Daily Amount: 3 Tabs.  methylPREDNISolone (MEDROL DOSEPACK) 4 mg tablet Per dose pack instructions    oxyCODONE-acetaminophen (PERCOCET 10)  mg per tablet Take 1 Tab by mouth every six (6) hours as needed. Max Daily Amount: 4 Tabs. (Patient not taking: Reported on 3/9/2017)    varenicline (CHANTIX CONTINUING MONTH PAK) 1 mg tablet Take 1 mg by mouth two (2) times daily (after meals).  fexofenadine-pseudoephedrine (ALLEGRA-D 12 HOUR)  mg Tb12 Take 1 Tab by mouth every twelve (12) hours.  HYDROcodone-acetaminophen (NORCO) 5-325 mg per tablet     melatonin tab tablet Take 10 mg by mouth nightly.  gabapentin (NEURONTIN) 600 mg tablet Take 1 Tab by mouth four (4) times daily.  buPROPion SR (WELLBUTRIN SR) 150 mg SR tablet Take 150 mg by mouth two (2) times a day. No current facility-administered medications for this visit. REVIEW OF SYSTEM   Patient denies: Weight loss, Fever/Chills, HA, Visual changes, Fatigue, Chest pain, SOB, Abdominal pain, N/V/D/C, Blood in stool or urine, Edema. Pertinent positive as above in HPI. All others were negative    PHYSICAL EXAM:   Visit Vitals    /80    Pulse 97    Temp 96.9 °F (36.1 °C) (Oral)    Ht 5' 2\" (1.575 m)    Wt 200 lb (90.7 kg)    SpO2 98%    BMI 36.58 kg/m2     The patient is a well-developed, well-nourished female   in no acute distress. The patient is alert and oriented times three.   The patient is alert and oriented times three. Mood and affect are normal.  LYMPHATIC: lymph nodes are not enlarged and are within normal limits  SKIN: normal in color and non tender to palpation. There are no bruises or abrasions noted. NEUROLOGICAL: Motor sensory exam is within normal limits. Reflexes are equal bilaterally. There is normal sensation to pinprick and light touch  MUSCULOSKELETAL:    Examination Left Elbow   Skin Intact   Range of Motion 0-135   Tenderness Medial Epicondyle -   Tenderness Lateral Epicondyle -   Tenderness Olecranon Bursa -   Swelling -   Bruising -   Stability Normal   Motor Strength  Normal   Neurovascular Intact     Examination Left Hand Right Hand   Skin Intact Intact   Deformity - -   Swelling - -   Tenderness - -   Tenderness A1 Pulley - -   Finger flexion Full Full   Finger extension Full Full   Thenar Eminence Atrophy - -   Sensation Normal Normal   Capillary refill - -   Heberden's nodes - -   Dupuytren's - -     Examination Left Wrist Right Wrist   Skin Intact Intact   Tenderness - -   Flexion 60 60   Extension 60 60   Deformity - -   Effusion - -   Tinnel's sign + +   Phalen's test + +   Finklestein maneuver - -   Pain with thumb abduction - -       IMAGING: EMG of the b/l UE dated 10/25/17 was reviewed and read: This was an abnormal nerve conduction and EMG study showing there to be severe prolongation of the distal latency in the right median nerve this is consistent with severe right-sided carpal tunnel syndrome. There was a subtle degree of prolongation on the left side consistent with subtle left sided carpal tunnel syndrome. No sign of radiculopathy was identified. IMPRESSION:      ICD-10-CM ICD-9-CM    1. Bilateral carpal tunnel syndrome G56.03 354.0         PLAN:  1. This patient's pain is coming from EMG documented carpal tunnel syndrome. I feel that her pain in the left elbow is coming from tennis elbow. I advised her to limit her activities to help with that pain.  I discussed the risks and benefits and potential adverse outcomes of both operative vs non operative treatment of right carpal tunnel syndrome with the patient. Patient wishes to proceed with right endoscopic carpal tunnel release. Risks of operative intervention include but not limited to bleeding, infection, deep vein thrombosis, pulmonary embolism, death, limb length discrepancy, reflexive sympathetic dystrophy, fat embolism syndrome,damage to blood vessels and nerves, malunion, non-union, delayed union, failure of hardware, post traumatic arthritis, stroke, heart attack, and death. Patient understands that infection may arise and may require numerous surgeries. History and physical exam scheduled for a later date. Risk factors include: n/a  2. No cortisone injection indicated today   3. No Physical/Occupational Therapy indicated today  4. No diagnostic test indicated today  5. No durable medical equipment indicated today  6. No referral indicated today   7. No medications indicated today  8. No Narcotic indicated today     RTC H&P  Follow-up Disposition: Not on File    Scribed by Ashley eKnt [address]) as dictated by Adriane Parikh MD    I, Dr. Adriane Parikh, confirm that all documentation is accurate.     Adriane Parikh M.D.   [facility]

## 2018-01-31 ENCOUNTER — OFFICE VISIT (OUTPATIENT)
Dept: ORTHOPEDIC SURGERY | Age: 48
End: 2018-01-31

## 2018-01-31 VITALS
BODY MASS INDEX: 36.51 KG/M2 | OXYGEN SATURATION: 100 % | HEART RATE: 87 BPM | WEIGHT: 198.4 LBS | TEMPERATURE: 95.8 F | SYSTOLIC BLOOD PRESSURE: 127 MMHG | HEIGHT: 62 IN | DIASTOLIC BLOOD PRESSURE: 84 MMHG

## 2018-01-31 DIAGNOSIS — M21.611 BUNION OF RIGHT FOOT: Primary | ICD-10-CM

## 2018-01-31 DIAGNOSIS — M20.41 HAMMERTOE OF SECOND TOE OF RIGHT FOOT: ICD-10-CM

## 2018-01-31 NOTE — MR AVS SNAPSHOT
[address] [place] [address], [address] [facility] 
[phone] Patient: Arjun Jordan MRN: [id number] :1970 Visit Information Date & Time Provider Department Dept. Phone Encounter #  
 2018  7:50 AM Amparo Ariza, 27 [facility] 29 [id number] Upcoming Health Maintenance Date Due Pneumococcal 19-64 Medium Risk (1 of 1 - PPSV23) 1987 DTaP/Tdap/Td series (1 - Tdap) 1989 PAP AKA CERVICAL CYTOLOGY 1989 Influenza Age 5 to Adult 2017 Allergies as of 2018  Review Complete On: 2018 By: Amparo Ariza MD  
 No Known Allergies Current Immunizations  Never Reviewed No immunizations on file. Not reviewed this visit You Were Diagnosed With   
  
 Codes Comments Bunion of right foot    -  Primary ICD-10-CM: M21.611 ICD-9-CM: 727.1 Hammertoe of second toe of right foot     ICD-10-CM: M20.41 ICD-9-CM: 735.4 Vitals BP Pulse Temp Height(growth percentile) Weight(growth percentile) SpO2  
 127/84 87 95.8 °F (35.4 °C) (Oral) 5' 2\" (1.575 m) 198 lb 6.4 oz (90 kg) 100% BMI OB Status Smoking Status 36.29 kg/m2 Hysterectomy Current Every Day Smoker BMI and BSA Data Body Mass Index Body Surface Area  
 36.29 kg/m 2 1.98 m 2 Preferred Pharmacy Pharmacy Name Phone [id number], [facility] [facility] [phone] Your Updated Medication List  
  
   
This list is accurate as of: 18  9:05 AM.  Always use your most recent med list.  
  
  
  
  
 acetaminophen-codeine 300-30 mg per tablet Commonly known as:  TYLENOL #3  
TAKE 1 TABLET BY MOUTH EVERY 3-4 HOURS AS NEEDED FOR PAIN  
  
 ALLEGRA-D 12 HOUR  mg Tb12 Generic drug:  fexofenadine-pseudoephedrine Take 1 Tab by mouth every twelve (12) hours. amoxicillin 500 mg Tab TAKE 2 TABLETS BY MOUTH AT BEDTIME TONIGHT,THEN TAKE 1 TABLET BY MOUTH EVERY 6 HOURS UNTIL GONE  
  
 * CHANTIX CONTINUING MONTH YVONNE 1 mg tablet Generic drug:  varenicline Take 1 mg by mouth two (2) times daily (after meals). * CHANTIX STARTING MONTH BOX 0.5 mg (11)- 1 mg (42) Dspk Generic drug:  varenicline AS DIRECTED ORALLY  
  
 cyclobenzaprine 10 mg tablet Commonly known as:  FLEXERIL Take 1 Tab by mouth nightly as needed for Muscle Spasm(s). doxycycline 100 mg tablet Commonly known as:  VIBRA-TABS TAKE 1 TABLET BY MOUTH TWICE A DAY  
  
 gabapentin 600 mg tablet Commonly known as:  NEURONTIN Take 1 Tab by mouth four (4) times daily. * HYDROcodone-acetaminophen 5-325 mg per tablet Commonly known as:  NORCO  
  
 * HYDROcodone-acetaminophen 5-325 mg per tablet Commonly known as:  Shreya Marcus Take 1 Tab by mouth every eight (8) hours as needed for Pain. Max Daily Amount: 3 Tabs. melatonin Tab tablet Take 10 mg by mouth nightly. methylPREDNISolone 4 mg tablet Commonly known as:  Masters Smart Per dose pack instructions  
  
 oxyCODONE-acetaminophen  mg per tablet Commonly known as:  PERCOCET 10 Take 1 Tab by mouth every six (6) hours as needed. Max Daily Amount: 4 Tabs. WELLBUTRIN  mg SR tablet Generic drug:  buPROPion SR Take 150 mg by mouth two (2) times a day. * Notice: This list has 4 medication(s) that are the same as other medications prescribed for you. Read the directions carefully, and ask your doctor or other care provider to review them with you. We Performed the Following AMB POC XRAY, FOOT; COMPLETE, 3+ VIEW [92876 CPT(R)] Patient Instructions Please follow up with Jennifer Harley. You are advised to contact us if your condition worsens. Surgery for Hammer Toe: Before Your Surgery What is surgery for hammer toe? Hammer toe surgery straightens a curled toe that causes problems and does not get better with other treatment. Your doctor will make one or more small cuts on your deformed toe joint. These cuts are called incisions. Your doctor will make them to release the tendons that are holding your toe. Then he or she may remove pieces of bone. Your toe may be held in place, such as with a pin or wire. The pin or wire may stay in your toe, or it may be removed after about 3 to 6 weeks. Sometimes a pin is placed so that it sticks out the end of your toe. Then it can be removed without another surgery. Most people can go home right after this surgery. You may be able to go back to your normal routine within 3 to 6 weeks. But it may take longer to recover. You should be able to walk on your toe within a day after surgery. But you may need crutches for a few days. Follow-up care is a key part of your treatment and safety. Be sure to make and go to all appointments, and call your doctor if you are having problems. It's also a good idea to know your test results and keep a list of the medicines you take. What happens before surgery? ?Surgery can be stressful. This information will help you understand what you can expect. And it will help you safely prepare for surgery. ? Preparing for surgery ? · Understand exactly what surgery is planned, along with the risks, benefits, and other options. · Tell your doctors ALL the medicines, vitamins, supplements, and herbal remedies you take. Some of these can increase the risk of bleeding or interact with anesthesia. ? · If you take blood thinners, such as warfarin (Coumadin), clopidogrel (Plavix), or aspirin, be sure to talk to your doctor. He or she will tell you if you should stop taking these medicines before your surgery.  Make sure that you understand exactly what your doctor wants you to do.  
? · Your doctor will tell you which medicines to take or stop before your surgery. You may need to stop taking certain medicines a week or more before surgery. So talk to your doctor as soon as you can.  
? · If you have an advance directive, let your doctor know. It may include a living will and a durable power of  for health care. Bring a copy to the hospital. If you don't have one, you may want to prepare one. It lets your doctor and loved ones know your health care wishes. Doctors advise that everyone prepare these papers before any type of surgery or procedure. What happens on the day of surgery? · Follow the instructions exactly about when to stop eating and drinking. If you don't, your surgery may be canceled. If your doctor told you to take your medicines on the day of surgery, take them with only a sip of water. ? · Take a bath or shower before you come in for your surgery. Do not apply lotions, perfumes, deodorants, or nail polish. ? · Do not shave the surgical site yourself. ? · Take off all jewelry and piercings. And take out contact lenses, if you wear them. ? · Wear clothing that is easy to put on and take off. You may have a large bandage on your foot. ? At the hospital or surgery center · Bring a picture ID. ? · The area for surgery is often marked to make sure there are no errors. ? · You will be kept comfortable and safe by your anesthesia provider. You may get medicine that relaxes you or puts you in a light sleep. The area being worked on will be numb. ? · The surgery will take about 30 to 60 minutes. Going home · Be sure you have someone to drive you home. Anesthesia and pain medicine make it unsafe for you to drive. ? · You will be given more specific instructions about recovering from your surgery. They will cover things like diet, wound care, follow-up care, driving, and getting back to your normal routine. When should you call your doctor? · You have questions or concerns. ? · You don't understand how to prepare for your surgery. ? · You become ill before the surgery (such as fever, flu, or a cold). ? · You need to reschedule or have changed your mind about having the surgery. Where can you learn more? Go to http://jose martin-karrie.info/. Enter C753 in the search box to learn more about \"Surgery for Hammer Toe: Before Your Surgery. \" Current as of: March 21, 2017 Content Version: 11.4 © 9393-1281 [employer]. Care instructions adapted under license by [employer] (which disclaims liability or warranty for this information). If you have questions about a medical condition or this instruction, always ask your healthcare professional. [address] any warranty or liability for your use of this information. Introducing [facility] [place] [facility]! Dear Carroll Flores: Thank you for requesting a [employer] account. Our records indicate that you already have an active [employer] account. You can access your account anytime at [email or web address]. [employer] Did you know that you can access your hospital and ER discharge instructions at any time in [employer]? You can also review all of your test results from your hospital stay or ER visit. Additional Information If you have questions, please visit the Frequently Asked Questions section of the [employer] website at [email or web address]/. Remember, [employer] is NOT to be used for urgent needs. For medical emergencies, dial 911. Now available from your iPhone and Android! Please provide this summary of care documentation to your next provider. Your primary care clinician is listed as Refugio Subramanian. If you have any questions after today's visit, please call [phone].

## 2018-01-31 NOTE — PROGRESS NOTES
AMBULATORY PROGRESS NOTE      Patient: Ismael Pinzon             MRN: [id number]     SSN: [id number] Body mass index is 36.29 kg/(m^2). YOB: 1970     AGE: 50 y.o. EX: female    PCP: Tammy Mccormick MD    IMPRESSION/DIAGNOSIS AND TREATMENT PLAN     DIAGNOSES  1. Bunion of right foot    2. Hammertoe of second toe of right foot        Orders Placed This Encounter    [60241] Foot Min 3V      Ismael Pinzon understands her diagnoses and the proposed plan. My overall diagnosis is a rigid, right number two hammertoe, as well as a moderate bunion deformity with hypermobility and increased mobility and splaying at the first TMT region. I spoke to her about a planned surgical procedure, as she is having disabling pain and discomfort despite shoe wear changes, modifying her activities, and taking over-the-counter analgesics. IMPRESSION: Rigid right number two hammertoe, bunion deformity with hypermobility. Planned surgery will be a right Lapidus procedure, lapiplasty procedure, Diamond instrumentation and Arthrex additional instrumentation for a first TMT joint arthrodesis, modified Ma distal soft tissue procedure, as well as Mazin closing wedge osteotomy proximal phalanx, right number two PIP resection arthroplasty with a DART hammertoe Arthrex implant to the right number two toe, extensor digitorum longus lengthening of the number two toe, extensor digitorum brevis tenotomy possible horizontal capsulotomy of the right number two toe at the metatarsophalangeal joint region. I had a lengthy discussion with her. I spoke to her about postoperative recovery. I told her we would see her quite frequently postop. She will be nonweightbearing for at least the first one to two weeks, but some partial weightbearing primarily on the heel just for balance. It takes about three weeks for the incisions to heal at a minimum. Hopefully, the sutures will be out at three weeks.   She will be in a CAM walker boot for upwards of a minimum of two months, and it will be at least two months before she can fit into a tennis shoe. She wants to have the surgery done sometime in June 2018 right after school ends. She is employed as a . She does have carpal tunnel syndrome. She is going to have surgery on her right carpal tunnel by Dr. Koko Marcos, for which I anticipate this would be scheduled, as she describes this on me, in April 2018. This will give her sufficient times for her hands to recover prior to doing complex reconstructive surgery on her right foot. Plan:    1) Follow up with Rigoberto Galindo for Surgical Scheduling    RTO - Surgical Scheduling    HPI AND EXAMINATION     Oneita Him IS A 50 y.o. female who presents to my outpatient office complaining of foot pain. Patient reports that she had bilateral bunions for several years, but her CC is due to a right second toe hammering that causes pain and discomfort. She has noted some discomfort while ambulating due to the second hammer toe. Of note, she complains of a fullness along the plantar aspect of her right arch. Patient states she gets cysts frequently and has been instructed that it may be a plantar fibroma. She has been instructed to watch for changes in size of the fullness and come in for evaluation if it is the case. Surgical procedures for intervention have been discussed with the patient to correct the second right hammer toe. Patient works as a  and plans on scheduling the surgery for June since she would like to avoid missing any days from work. Appearance: Alert, well appearing and pleasant patient who is in no distress, oriented to person, place/time, and who follows commands. Psychiatric: Affect and mood are appropriate.    Cardiovascular/Peripheral Vascular: Normal Pulses to each hand and foot  Musculoskeletal:  LOCATION:  Right FOOT/ANKLE  Integumentary: No rashes, skin patches, wounds, or abrasions to the right or left legs   Warm and normal color. No regions of expressible drainage. Callus to dorsal Hammer toe is not present, Calluses to plantar foot is not present    Fullness (fixed superficial//non mobile) along the plantar medial arch, not associated with FHL tendon. Gait:  Slight Limp with gait  Alignment: moderate Bunion Alignment is present with pronation, adduction and #1 and #2 toe abutment. severe Hammertoe(s) present  (Rigid) 2   Tenderness: mild Medial Eminence of Bunion    None to Hammer Toes   NONE to Medial Malleolar, 4/5 Met base midfoot, achilles, tib post, or   NONE to syndesmosis. Motor/Strength/Tone Exam: Normal Dorsiflexion/Plantar Flexion of a great toe MTP    Sensory Exam:   Intact Normal Sensation to ankle/foot, but slight decreased             sensation to medial cutaneous nerve along great toe MTP medial             eminence region. Stability Testing:  YES SPRING back mobility test for 1st metatarsal               Yes spring back at: RIGHT 1st TMT instability                No anterolateral or varus instability of the Ankle or Subtalar Joints               No peroneal tendon instability noted  ROM: Normal ROM noted to ankle  Contractures: No Achilles or Gastrocnemius Contractures  Calf tenderness: Absent for calf or gastrocnemius muscle regions       Soft, supple, non tender, non taut lower extremity compartments  Alignment: Neutral Hindfoot  Wounds/Abrasions:   None present  Extremities:   No embolic phenomena to the toes or hands         No significant edema to the foot and or toes.         Lower extremities are warm and appear well perfused    DVT: No evidence of DVT seen on examination at this time     No calf swelling, no tenderness to calf muscles  Lymphatic:  No Evidence of Lymphedema  Vascular: Medial Border of Tibia Region: Edema is not present        Pulses: Dorsalis Pedis &  Posterior Tibial Pulses : Palpable yes        Varicosities Lower Limbs :  None    Neuro: Negative bilateral Straight leg raise (seated position)    See Musculoskeletal section for pertinent individual extremity examination    No abnormal hand/wrist, foot/ankle, or facial/neck tremors. CHART REVIEW     Past Medical History:   Diagnosis Date    Psychiatric disorder     depression     Current Outpatient Prescriptions   Medication Sig    cyclobenzaprine (FLEXERIL) 10 mg tablet Take 1 Tab by mouth nightly as needed for Muscle Spasm(s).  fexofenadine-pseudoephedrine (ALLEGRA-D 12 HOUR)  mg Tb12 Take 1 Tab by mouth every twelve (12) hours.  melatonin tab tablet Take 10 mg by mouth nightly.  doxycycline (VIBRA-TABS) 100 mg tablet TAKE 1 TABLET BY MOUTH TWICE A DAY    acetaminophen-codeine (TYLENOL #3) 300-30 mg per tablet TAKE 1 TABLET BY MOUTH EVERY 3-4 HOURS AS NEEDED FOR PAIN    amoxicillin 500 mg tab TAKE 2 TABLETS BY MOUTH AT BEDTIME TONIGHT,THEN TAKE 1 TABLET BY MOUTH EVERY 6 HOURS UNTIL GONE    CHANTIX STARTING MONTH BOX 0.5 mg (11)- 1 mg (42) DsPk AS DIRECTED ORALLY    HYDROcodone-acetaminophen (NORCO) 5-325 mg per tablet Take 1 Tab by mouth every eight (8) hours as needed for Pain. Max Daily Amount: 3 Tabs.  methylPREDNISolone (MEDROL DOSEPACK) 4 mg tablet Per dose pack instructions    oxyCODONE-acetaminophen (PERCOCET 10)  mg per tablet Take 1 Tab by mouth every six (6) hours as needed. Max Daily Amount: 4 Tabs. (Patient not taking: Reported on 3/9/2017)    varenicline (CHANTIX CONTINUING MONTH PAK) 1 mg tablet Take 1 mg by mouth two (2) times daily (after meals).  HYDROcodone-acetaminophen (NORCO) 5-325 mg per tablet     gabapentin (NEURONTIN) 600 mg tablet Take 1 Tab by mouth four (4) times daily.  buPROPion SR (WELLBUTRIN SR) 150 mg SR tablet Take 150 mg by mouth two (2) times a day. No current facility-administered medications for this visit.       No Known Allergies  Past Surgical History:   Procedure Laterality Date    HX GYN      hysterectomy    HX GYN      c/s     Social History     Occupational History    Not on file. Social History Main Topics    Smoking status: Current Every Day Smoker     Packs/day: 0.50    Smokeless tobacco: Never Used    Alcohol use Yes      Comment: socially    Drug use: Not on file    Sexual activity: Not on file     Family History   Problem Relation Age of Onset    No Known Problems Mother     No Known Problems Father     No Known Problems Sister     No Known Problems Brother         REVIEW OF SYSTEMS : 1/31/2018  ALL BELOW ARE Negative except : SEE HPI     CONSTITUTIONAL: denies chills, fatigue, fever, weight change   PSYCH: denies anxiety, depression, irritability or mood swings   ENT: denies - headaches, hearing change, nasal congestion, oral lesions, or sore throat   HEM/ONC denies - bleeding problems, bruising, pallor or swollen lymph nodes   ENDO: denies hot flashes, polydipsia/polyuria or temperature intolerance   RESP: denies - cough, shortness of breath or wheezing   CV: denies - chest pain, edema or palpitations, LEWIS  GI: denies - abdominal pain, change in bowel habits, constipation, diarrhea or nausea/vomiting   : denies - dysuria, hematuria, incontinence, pelvic pain   MSK: denies  - See HPI. NEURO: denies - confusion, headaches, seizures or weakness   DERM: denies - dry skin, hair changes, rash or skin lesion changes  VASCULAR: Peripheral Vascular: No calf pain, vascular vein tenderness to calf pain              No calf throbbing, posterior knee throbbing pain     DIAGNOSTIC IMAGING     FOOT X RAYS 3 VIEWS Right   1/31/2018    WEIGHT BEARING    X RAYS AT [address] [place] [address]  1/31/2018      Bones: No fractures or dislocations.  No focal osteolytic or osteoblastic process    Bone Spurs: Inferior Calcaneal Bone Spur   Alignment: Bunion Alignment: increased IM and HVA    moderate Hallux Valgus Alignment, moderate increased IM ANGLE (1st/2nd)   Metatarsus Adductus is absent   Midfoot Alignment is WNL. Joint: Slightly Incongruent  Soft Tissues: Soft Tissues Normal     No ankle joint effusion in lateral projection. Mineralization: Suggests Normal Bone    I have personally reviewed the results of the above study. The interpretation of this study is my professional opinion    Davon Park MD  1/31/2018      8:46 AM       .            Written by Rosa Barrera, as dictated by Heather Linder MD. I, DrGrazyna, Heather Linder MD, confirm that all documentation is accurate.

## 2018-01-31 NOTE — PROCEDURES
FOOT X RAYS 3 VIEWS Right   1/31/2018    WEIGHT BEARING    X RAYS AT [address] [place] [address]  1/31/2018      Bones: No fractures or dislocations. No focal osteolytic or osteoblastic process    Bone Spurs: Inferior Calcaneal Bone Spur   Alignment: Bunion Alignment: increased IM and HVA    moderate Hallux Valgus Alignment, moderate increased IM ANGLE (1st/2nd)   Metatarsus Adductus is absent   Midfoot Alignment is WNL. Joint: Slightly Incongruent  Soft Tissues: Soft Tissues Normal     No ankle joint effusion in lateral projection. Mineralization: Suggests Normal Bone    I have personally reviewed the results of the above study.  The interpretation of this study is my professional opinion    Camacho Catherine MD  1/31/2018      8:46 AM       .

## 2018-01-31 NOTE — PATIENT INSTRUCTIONS
Please follow up with Mariah Ribeiro. You are advised to contact us if your condition worsens. Surgery for Hammer Toe: Before Your Surgery  What is surgery for hammer toe? Hammer toe surgery straightens a curled toe that causes problems and does not get better with other treatment. Your doctor will make one or more small cuts on your deformed toe joint. These cuts are called incisions. Your doctor will make them to release the tendons that are holding your toe. Then he or she may remove pieces of bone. Your toe may be held in place, such as with a pin or wire. The pin or wire may stay in your toe, or it may be removed after about 3 to 6 weeks. Sometimes a pin is placed so that it sticks out the end of your toe. Then it can be removed without another surgery. Most people can go home right after this surgery. You may be able to go back to your normal routine within 3 to 6 weeks. But it may take longer to recover. You should be able to walk on your toe within a day after surgery. But you may need crutches for a few days. Follow-up care is a key part of your treatment and safety. Be sure to make and go to all appointments, and call your doctor if you are having problems. It's also a good idea to know your test results and keep a list of the medicines you take. What happens before surgery? ?Surgery can be stressful. This information will help you understand what you can expect. And it will help you safely prepare for surgery. ? Preparing for surgery  ? · Understand exactly what surgery is planned, along with the risks, benefits, and other options. · Tell your doctors ALL the medicines, vitamins, supplements, and herbal remedies you take. Some of these can increase the risk of bleeding or interact with anesthesia. ? · If you take blood thinners, such as warfarin (Coumadin), clopidogrel (Plavix), or aspirin, be sure to talk to your doctor.  He or she will tell you if you should stop taking these medicines before your surgery. Make sure that you understand exactly what your doctor wants you to do.   ? · Your doctor will tell you which medicines to take or stop before your surgery. You may need to stop taking certain medicines a week or more before surgery. So talk to your doctor as soon as you can.   ? · If you have an advance directive, let your doctor know. It may include a living will and a durable power of  for health care. Bring a copy to the hospital. If you don't have one, you may want to prepare one. It lets your doctor and loved ones know your health care wishes. Doctors advise that everyone prepare these papers before any type of surgery or procedure. What happens on the day of surgery? · Follow the instructions exactly about when to stop eating and drinking. If you don't, your surgery may be canceled. If your doctor told you to take your medicines on the day of surgery, take them with only a sip of water. ? · Take a bath or shower before you come in for your surgery. Do not apply lotions, perfumes, deodorants, or nail polish. ? · Do not shave the surgical site yourself. ? · Take off all jewelry and piercings. And take out contact lenses, if you wear them. ? · Wear clothing that is easy to put on and take off. You may have a large bandage on your foot. ? At the hospital or surgery center   · Bring a picture ID. ? · The area for surgery is often marked to make sure there are no errors. ? · You will be kept comfortable and safe by your anesthesia provider. You may get medicine that relaxes you or puts you in a light sleep. The area being worked on will be numb. ? · The surgery will take about 30 to 60 minutes. Going home   · Be sure you have someone to drive you home. Anesthesia and pain medicine make it unsafe for you to drive. ? · You will be given more specific instructions about recovering from your surgery.  They will cover things like diet, wound care, follow-up care, driving, and getting back to your normal routine. When should you call your doctor? · You have questions or concerns. ? · You don't understand how to prepare for your surgery. ? · You become ill before the surgery (such as fever, flu, or a cold). ? · You need to reschedule or have changed your mind about having the surgery. Where can you learn more? Go to http://jose martin-karrie.info/. Enter M360 in the search box to learn more about \"Surgery for Hammer Toe: Before Your Surgery. \"  Current as of: March 21, 2017  Content Version: 11.4  © 4589-1873 [employer]. Care instructions adapted under license by [employer] (which disclaims liability or warranty for this information). If you have questions about a medical condition or this instruction, always ask your healthcare professional. [address] any warranty or liability for your use of this information.

## 2018-03-12 DIAGNOSIS — G56.03 CARPAL TUNNEL SYNDROME, BILATERAL: Primary | ICD-10-CM

## 2018-03-13 ENCOUNTER — OFFICE VISIT (OUTPATIENT)
Dept: ORTHOPEDIC SURGERY | Age: 48
End: 2018-03-13

## 2018-03-13 VITALS
SYSTOLIC BLOOD PRESSURE: 118 MMHG | DIASTOLIC BLOOD PRESSURE: 77 MMHG | OXYGEN SATURATION: 100 % | HEIGHT: 62 IN | HEART RATE: 88 BPM | BODY MASS INDEX: 35.85 KG/M2 | WEIGHT: 194.8 LBS | TEMPERATURE: 97.2 F

## 2018-03-13 DIAGNOSIS — G56.03 BILATERAL CARPAL TUNNEL SYNDROME: ICD-10-CM

## 2018-03-13 DIAGNOSIS — G89.18 POST-OPERATIVE PAIN: Primary | ICD-10-CM

## 2018-03-13 RX ORDER — TRAMADOL HYDROCHLORIDE 50 MG/1
50 TABLET ORAL
Qty: 60 TAB | Refills: 0 | Status: SHIPPED | OUTPATIENT
Start: 2018-03-13 | End: 2018-06-26 | Stop reason: DRUGHIGH

## 2018-03-13 NOTE — PROGRESS NOTES
HISTORY AND PHYSICAL          Patient: Tong Shelton                MRN: [id number]       SSN: [id number]  YOB: 1970          AGE: 50 y.o. SEX: female      Patient scheduled for:  RIGHT ENDOSCOPIC CARPAL TUNNEL RELEASE   Surgeon: Jolie Jamil MD    ANESTHESIA TYPE:  General    HISTORY:     The patient was seen in the office today for a preoperative history and physical for an upcoming above listed surgery. The patient is a pleasant 50 y.o. female who has a history of b/l  carpal tunnel syndrome R>L. she rates her pain 0/10 today. Pain has been present for quite awhile. Patient describes the pain as numbing and tingling that is Intermittent in nature. Symptoms are worse with certain motions of the wrists and hands, Activity and is better with  Rest. Associated symptoms include numbness, tingling, dropping things, unable to . Since problem started, it: has worsened. Pain does not wake patient up at night. Has taken no recent medications for the problem. Patient has an additional complaint of left elbow pain that is worse with grabbing like lifting children. Has tried following treatments: Injections:NO; Brace:NO; Therapy:NO; Cane/Crutch:NO       Due to the current findings, affected activity of daily living and continued pain and discomfort, surgical intervention is indicated. The alternatives, risks, and complications, including but not limited to infection, blood loss, need for blood transfusion, neurovascular damage, cheyenne-incisional numbness, subcutaneous hematoma, bone fracture, anesthetic complications, DVT, PE, death, RSD, postoperative stiffness and pain, possible surgical scar, delayed healing and nonhealing, reflexive sympathetic dystrophy, damage to blood vessels and nerves, need for more surgery, MI, and stroke,  failure of hardware, gait disturbances,have been discussed. The patient understands and wishes to proceed with surgery.      PAST MEDICAL HISTORY: Past Medical History:   Diagnosis Date    Psychiatric disorder     depression       CURRENT MEDICATIONS:     Current Outpatient Prescriptions   Medication Sig Dispense Refill    traMADol (ULTRAM) 50 mg tablet Take 1 Tab by mouth every six (6) hours as needed for Pain. Max Daily Amount: 200 mg. Do not take until after surgery 60 Tab 0    fexofenadine-pseudoephedrine (ALLEGRA-D 12 HOUR)  mg Tb12 Take 1 Tab by mouth every twelve (12) hours.  melatonin tab tablet Take 10 mg by mouth nightly.  cyclobenzaprine (FLEXERIL) 10 mg tablet Take 1 Tab by mouth nightly as needed for Muscle Spasm(s). 30 Tab 2    doxycycline (VIBRA-TABS) 100 mg tablet TAKE 1 TABLET BY MOUTH TWICE A DAY  0    acetaminophen-codeine (TYLENOL #3) 300-30 mg per tablet TAKE 1 TABLET BY MOUTH EVERY 3-4 HOURS AS NEEDED FOR PAIN  0    amoxicillin 500 mg tab TAKE 2 TABLETS BY MOUTH AT BEDTIME TONIGHT,THEN TAKE 1 TABLET BY MOUTH EVERY 6 HOURS UNTIL GONE  0    CHANTIX STARTING MONTH BOX 0.5 mg (11)- 1 mg (42) DsPk AS DIRECTED ORALLY  0    HYDROcodone-acetaminophen (NORCO) 5-325 mg per tablet Take 1 Tab by mouth every eight (8) hours as needed for Pain. Max Daily Amount: 3 Tabs. 90 Tab 0    methylPREDNISolone (MEDROL DOSEPACK) 4 mg tablet Per dose pack instructions 21 Tab 0    oxyCODONE-acetaminophen (PERCOCET 10)  mg per tablet Take 1 Tab by mouth every six (6) hours as needed. Max Daily Amount: 4 Tabs. (Patient not taking: Reported on 3/9/2017) 40 Tab 0    varenicline (CHANTIX CONTINUING MONTH PAK) 1 mg tablet Take 1 mg by mouth two (2) times daily (after meals).  HYDROcodone-acetaminophen (NORCO) 5-325 mg per tablet       gabapentin (NEURONTIN) 600 mg tablet Take 1 Tab by mouth four (4) times daily. 120 Tab 1    buPROPion SR (WELLBUTRIN SR) 150 mg SR tablet Take 150 mg by mouth two (2) times a day.          ALLERGIES:     No Known Allergies      SURGICAL HISTORY:     Past Surgical History:   Procedure Laterality Date    HX GYN      hysterectomy    HX GYN      c/s       SOCIAL HISTORY:     Social History     Social History    Marital status:      Spouse name: N/A    Number of children: N/A    Years of education: N/A     Social History Main Topics    Smoking status: Current Every Day Smoker     Packs/day: 0.50    Smokeless tobacco: Never Used    Alcohol use Yes      Comment: socially    Drug use: None    Sexual activity: Not Asked     Other Topics Concern    None     Social History Narrative       FAMILY HISTORY:     Family History   Problem Relation Age of Onset    No Known Problems Mother     No Known Problems Father     No Known Problems Sister     No Known Problems Brother        REVIEW OF SYSTEMS:     Negative for fevers, chills, chest pain, shortness of breath, weight loss, recent illness     General: Negative for fever and chills. No unexpected change in weight. Denies fatigue. No change in appetite. Skin: Negative for rash or itching. HEENT: Negative for congestion, sore throat, neck pain and neck stiffness. No change in vision or hearing. Hasn't noted any enlarged lymph nodes in the neck. Cardiovascular:  Negative for chest pain and palpitations. Has not noted pedal edema. Respiratory: Negative for cough, colds, sinus, hemoptysis, shortness of breath and wheezing. Gastrointestinal: Negative for nausea and vomiting, rectal bleeding, coffee ground emesis, abdominal pain, diarrhea and constipation. Genitourinary: Negative for dysuria, frequency urgency, or burning on micturition. No flank pain, no foul smelling urine, no difficulty with initiating urination. Hematological: Negative for bleeding or easy bruising. Musculoskeletal: Negative  for arthralgias, back pain or neck pain. Neurological: Negative for dizziness, seizures or syncopal episodes. Denies headaches. Endocrine: Denies excessive thirst.  No heat/cold intolerance.   Psychiatric: Negative for depression or insomnia. PHYSICAL EXAMINATION:     VITALS:   Visit Vitals    /77 (BP 1 Location: Left arm, BP Patient Position: Sitting)    Pulse 88    Temp 97.2 °F (36.2 °C) (Oral)    Ht 5' 2\" (1.575 m)    Wt 194 lb 12.8 oz (88.4 kg)    SpO2 100%    BMI 35.63 kg/m2     GEN:  Well developed, well nourished 50 y.o. female in no acute distress. HEENT: Normocephalic and atraumatic. Eyes: Conjunctivae and EOM are normal.Pupils are equal, round, and reactive to light. External ear normal appearance, external nose normal appearing. Mouth/Throat: Oropharynx is clear and moist, able to handle oral secretions w/out difficulty, airway patent  NECK: Supple. Normal ROM, No lymphadenopathy. Trachea is midline. No bruising, swelling or deformity  RESP: Clear to auscultation bilaterally. No wheezes, rales, rhonchi. Normal effort and breath sounds. No respiratory distress  CARDIO: Normal rate, regular rhythm and normal heart sounds. No MGR. ABDOMEN: Soft, non-tender, non-distended, normoactive bowel sounds in all four quadrants. There is no tenderness. There is no rebound and no guarding. BACK: No CVA or spinal tenderness  BREAST:  Deferred  PELVIC:    Deferred   RECTAL:  Deferred   :           Deferred  EXTREMITIES: EXAMINATION OF: right wrist  Examination Right Hand   Skin Intact   Deformity -   Swelling -   Tenderness -   Tenderness A1 Pulley -   Finger flexion Full   Finger extension Full   Thenar Eminence Atrophy -   Sensation Normal   Capillary refill -   Heberden's nodes -   Dupuytren's -      Examination Right Wrist   Skin Intact   Tenderness -   Flexion 60   Extension 60   Deformity -   Effusion -   Tinnel's sign +   Phalen's test +   Finklestein maneuver -   Pain with thumb abduction -        NEUROVASCULAR: Sensation intact to light touch and strength grossly intact and symmetrical. No nystagmus. Positive distal pulses and capillary refill. DVT ASSESSMENT:  There is  calf tenderness.  No evidence of DVT seen on physical exam.  MOTOR: In tact  PSYCH: Alert an oriented to person, place and time. Mood, memory, affect, behavior and judgment normal       RADIOGRAPHS & DIAGNOSTIC STUDIES:     MRI/xray reveals :     EMG of the b/l UE dated 10/25/17 was reviewed and read: This was an abnormal nerve conduction and EMG study showing there to be severe prolongation of the distal latency in the right median nerve this is consistent with severe right-sided carpal tunnel syndrome. There was a subtle degree of prolongation on the left side consistent with subtle left sided carpal tunnel syndrome. No sign of radiculopathy was identified. ASSESSMENT:       Encounter Diagnoses   Name Primary?  Post-operative pain Yes    Bilateral carpal tunnel syndrome        PLAN:     Again, the alternatives, risks, and complications, as well as expected outcome were discussed. The patient understands and agrees to proceed with RIGHT ENDOSCOPIC CARPAL TUNNEL RELEASE.  Patient given orders listed below:    Orders Placed This Encounter    traMADol (ULTRAM) 50 mg tablet         Alexandro Tolbert PA-C  3/13/2018  3:59 PM

## 2018-03-28 ENCOUNTER — HOSPITAL ENCOUNTER (OUTPATIENT)
Dept: PHYSICAL THERAPY | Age: 48
Discharge: HOME | End: 2018-03-28
Payer: COMMERCIAL

## 2018-03-28 PROCEDURE — 97110 THERAPEUTIC EXERCISES: CPT

## 2018-03-28 PROCEDURE — 97165 OT EVAL LOW COMPLEX 30 MIN: CPT

## 2018-03-28 NOTE — PROGRESS NOTES
Hand Therapy Evaluation and Daily Note    Patient Name: Renetta Alanis  Date:3/28/2018  : 1970  Age: 50 y.o.y/o  [x]  Patient  Verified  Payor: BLUE CROSS / Plan: [facility] PPO / Product Type: PPO /    Referring Provider: Elias Orourke MD Visit:  2018  Onset Date:  2-3 years ago  Surgical Date: 3/26/2018  Surgical Procedure: Right carpal tunnel release    In time: 2:45  Out time:3:25  Total Treatment Time (min): 40  Visit #: 1 of 6    Treatment Area: Right carpal tunnel syndrome [G56.01]    Precautions:    Hand Dominance: right handed   Hand Involved: right    Total Evaluation Time:  30    History of Present Condition:  Patient is a 50 y.o. female with a chief complaint  of right wrist pain and weakness. Pt reports she began experiencing symptoms of carpal tunnel syndrome 2-3 yeas ago which ultimately lead to the decision for surgical intervention. Pt underwent a right carpal tunnel release on 3/26/2018. She presented to tx today with surgical dressing intact. Therapist removed dressing to find 3.5 cm surgical incision clean and healing well with no infection noted and stitches still intact. Pt demonstrated mild decrease in right wrist ROM and mild swelling of right DPC and wrist crease. Pt reported no tenderness with palpation to incision area and mild paresthesia of right MF. Pt provided and instructed in initial HEP including wrist AROM, composite flexion and extension of digits, and isolated blocking to the FDS and FDP. Pt reports she has a wrist cock up splint and she was instructed to wear splint at night and when performing functional activities. Skilled OT services are necessary to address deficits and improve quality of life.     Pain Rating:   Current: (0-no pain 10-debilitating pain) mild   At best: (0-no pain 10-debilitating pain) mild  At worst: (0-no pain 10-debilitating pain) mild  Location: right wrist  Type:  mild   Better with: ice  Worse with: movement    Medications/Allergies/Past Medical History:  See chart; reviewed with patient. Tobacco use    Diagnostic Tests: N/A    Prior Level of Function: Able to perform ADl and IADL tasks without functional limitations. Current Level of Function:  Unable to perform functional tasks using R hand. Social History: Pt lives in two story with fiance and two sons. Occupation/Job Requirements:  at [employer]. Observation: incision clean and intact, no signs of infection  Scar/incision:   3.5 cm  Location:  R volar wrist.     Palpation:  No tenderness with palpation to incision area. Range of Motion: Elbow/Wrist   Wrist 3/28/2018   Right/Left       Flex 62/90       Ext 54/65       UD 30/30       RD 20/20       FA         Pro 80/80       Sup 62/80         Strength:  Unable to test per CTR protocol. Sensation: mild paresthesia of right MF      Edema: GIRTH CHART measured in cm  Date: 3/28/2018     Side R/L    DPC circum.  19.4/18.2    Wrist Crease 15.2/14.9        Special Tests:    N/A    ADLs  Feeding:        []MaxA   []ModA   []Lm   [] CGA   []SBA   []Anahi   [x]Independent  UE Dressing:       []MaxA   [x]ModA   []Lm   [] CGA   []SBA   []Anaih   []Independent  LE Dressing:       []MaxA   [x]ModA   []Lm   [] CGA   []SBA   []Anahi   []Independent  Grooming:       []MaxA   []ModA   [x]Lm   [] CGA   []SBA   []Anahi   []Independent  Toileting:       []MaxA   []ModA   []Lm   [] CGA   []SBA   []Anahi   [x]Independent  Bathing:       []MaxA   []ModA   []Lm   [] CGA   []SBA   []Anahi   [x]Independent  Light Meal Prep:    []MaxA   []ModA   [x]Lm   [] CGA   []SBA   []Anahi   []Independent  Household/Other: []MaxA   [x]ModA   []Lm   [] CGA   []SBA   []Anahi   []Independent  Adaptive Equip:     []MaxA   []ModA   []Lm   [] CGA   []SBA   []Anahi   []Independent  Driving:       [x]MaxA   []ModA   []Lm   [] CGA   []SBA   []Anahi   []Independent      Todays Treatment:  Patient received an initial evaluation today followed by education as to diagnosis, precautions and treatment plan. Patient was provided with a basic home exercise program including wrist AROM, composite flexion and extension of digits, and isolated blocking to the FDS and FDP . OBJECTIVE     10 min Therapeutic Exercise:  [x] See flow sheet :   Rationale: increase ROM, increase strength and improve coordination to improve the patients ability to move right wrist thru pain-free ROM. With   [x] TE   [] TA   [] neuro   [] other: Patient Education: [x] Review HEP    [] Progressed/Changed HEP based on:   [] positioning   [] body mechanics   [] transfers   [] heat/ice application   [] Splint wear/care   [] Sensory re-education   [] scar management      [] other:      Pain Level (0-10 scale) post treatment: 3/10    Patient will continue to benefit from skilled OT services to modify and progress therapeutic interventions, address ROM deficits and address strength deficits to attain goals. Assessment: Pt unable to make a fist or oppose thumb to finger tips on right hand. Short Term Goals: To be accomplished in 2  weeks:  1. Patient will be compliant with initial home exercise program to take an active role in their rehabilitation process. 2. Patient will demonstrate a good understanding of their condition and strategies for self-management. Long Term Goals: To be accomplished in 4 weeks:   1. Patient will have 60 degrees of right wrist extension in order to increase ease with ADL's such as bathing, eating and dressing and to eventually bear weight thru the right upper extremity as in pushing up from a chair. Status at eval: 54  2. Patient will have 70 degrees of right wrist flexion in order to perform hygiene tasks and /or work with tools such as a screwdriver. Status at eval: 62  3. Patient will have 70 degrees of right forearm supination in order to perform ADL's including washing face and accepting change. Status at eval: 62  4. Patient will report no difficulty carrying a shopping bag with right hand on FOTO outcome measure in order to demonstrate improved functional performance. Status At Eval: unable to do    Frequency / Duration: Patient to be seen 2 times per week for 3 weeks:    Patient/ Caregiver education and instruction: Diagnosis, prognosis, exercises    Functional Status Measure:  Patient's: 30  FOTO Benchmark: 57  Expected Change: 27  FOTO score based on 0 - 100 point scale, with 100 being no impairment. These scores are determined by patient reported functional assessments compared against national benchmarked data.     Carry   Current  CL= 60-79%    Goal  CK= 40-59%    The severity rating is based on clinical judgment and the FOTO score.     Ivanna Sterling OT 3/28/2018 2:47 PM

## 2018-03-29 NOTE — PROGRESS NOTES
[facility]  [address]  [place], [address]  [phone] fax    Plan of Care/Statement of Necessity for Occupational Therapy Services    Patient name: Trino Woo Start of Care: 3/28/2018   Referral source: David Allen,* : 1970    Medical Diagnosis: Right carpal tunnel syndrome [G56.01]   Onset Date:3/26/2018   Treatment Diagnosis: Right wrist pain   Prior Hospitalization: see medical history Provider#: [id number]   Medications: Verified on Patient summary List    Comorbidities: tobacco use   Prior Level of Function: Able to perform ADl and IADL tasks without functional limitations. The Plan of Care and following information is based on the information from the initial evaluation. Assessment/ key information: Patient is a 50 y.o. female with a chief complaint  of right wrist pain and weakness. Pt reports she began experiencing symptoms of carpal tunnel syndrome 2-3 yeas ago which ultimately lead to the decision for surgical intervention. Pt underwent a right carpal tunnel release on 3/26/2018. She presented to tx today with surgical dressing intact. Therapist removed dressing to find 3.5 cm surgical incision clean and healing well with no infection noted and stitches still intact. Pt demonstrated mild decrease in right wrist ROM and mild swelling of right DPC and wrist crease. Pt reported no tenderness with palpation to incision area and mild paresthesia of right MF. Pt provided and instructed in initial HEP including wrist AROM, composite flexion and extension of digits, and isolated blocking to the FDS and FDP. Pt reports she has a wrist cock up splint and she was instructed to wear splint at night and when performing functional activities. Skilled OT services are necessary to address deficits and improve quality of life.     Evaluation Complexity: History LOW Complexity : Brief history review  Examination LOW Complexity : 1-3 performance deficits relating to physical, cognitive , or psychosocial skils that result in activity limitations and / or participation restrictions  Clinical Decision Making LOW Complexity : No comorbidities that affect functional and no verbal or physical assistance needed to complete eval tasks   Overall Complexity Rating: LOW     Problem List: Pain effecting function, Decreased range of motion, Decreased strength, Edema effecting function, Decreased coordination/prehension, Decreased ADL/functional abilities , Decreased flexibility/joint mobility and Sensability     Treatment Plan may include any combination of the following: Therapeutic exercise, Therapeutic activities, Physical agent/modality, Scar management, Manual therapy, Patient education and ADLs/IADLs    Patient / Family readiness to learn indicated by: asking questions, trying to perform skills and interest    Persons(s) to be included in education:   patient (P)    Barriers to Learning/Limitations: None    Patient Goal (s): To get my hand back.     Patient Self Reported Health Status: excellent    Rehabilitation Potential: good    Short Term Goals: To be accomplished in 2  weeks:  1. Patient will be compliant with initial home exercise program to take an active role in their rehabilitation process. 2. Patient will demonstrate a good understanding of their condition and strategies for self-management. Long Term Goals: To be accomplished in 4 weeks:                        1. Patient will have 60 degrees of right wrist extension in order to increase ease with ADL's such as bathing, eating and dressing and to eventually bear weight thru the right upper extremity as in pushing up from a chair. Status at eval: 54  2. Patient will have 70 degrees of right wrist flexion in order to perform hygiene tasks and /or work with tools such as a screwdriver. Status at eval: 62  3.  Patient will have 70 degrees of right forearm supination in order to perform ADL's including washing face and accepting change. Status at eval: 62  4. Patient will report no difficulty carrying a shopping bag with right hand on FOTO outcome measure in order to demonstrate improved functional performance. Status At Eval: unable to do     Frequency / Duration: Patient to be seen 2 times per week for 3 weeks:     Patient/ Caregiver education and instruction: Diagnosis, prognosis, exercises  [x]  Plan of care has been reviewed with Erma Izaguirre OT 3/28/2018 8:15 PM  ________________________________________________________________________    I certify that the above Therapy Services are being furnished while the patient is under my care. I agree with the treatment plan and certify that this therapy is necessary.     Physician's Signature:____________________  Date:____________Time:__________    Please sign and return to [facility] [address]  [address]  [place], [address]  [phone] fax

## 2018-04-02 ENCOUNTER — HOSPITAL ENCOUNTER (OUTPATIENT)
Dept: PHYSICAL THERAPY | Age: 48
Discharge: HOME | End: 2018-04-02
Payer: COMMERCIAL

## 2018-04-02 ENCOUNTER — OFFICE VISIT (OUTPATIENT)
Dept: ORTHOPEDIC SURGERY | Age: 48
End: 2018-04-02

## 2018-04-02 DIAGNOSIS — G56.01 RIGHT CARPAL TUNNEL SYNDROME: Primary | ICD-10-CM

## 2018-04-02 PROCEDURE — 97110 THERAPEUTIC EXERCISES: CPT

## 2018-04-02 NOTE — LETTER
NOTIFICATION RETURN TO WORK / SCHOOL 
 
4/2/2018 3:49 PM 
 
Ms. Kya Crockett [address] [address] To Whom It May Concern: Kya Crockett is currently under the care of [address] [place] [address] Miquel Franks. She will return to work on 4/9/18 with the following restrictions: no lifting children. If there are questions or concerns please have the patient contact our office. Sincerely, MARRY Stone

## 2018-04-02 NOTE — PROGRESS NOTES
OT DAILY TREATMENT NOTE - non [facility] 3-16    Patient Name: Klaudia Mo  Date:2018  : 1970  [x]  Patient  Verified  Payor: BLUE CROSS / Plan: [facility] PPO / Product Type: PPO /    In time:2:55  Out time: 3:30  Total Treatment Time (min): 35  Visit #: 2 of 6    Treatment Area: Right carpal tunnel syndrome [G56.01]    SUBJECTIVE  Pain Level (0-10 scale): 2/10  Any medication changes, allergies to medications, adverse drug reactions, diagnosis change, or new procedure performed?: [x] No    [] Yes (see summary sheet for update)  Subjective functional status/changes:   [] No changes reported  \"It's been feeling fine. I've been able to brush my hair and use the blow dryer with that hand. \"    OBJECTIVE  Modality rationale: decrease edema, decrease pain and increase tissue extensibility to improve the patients ability to move right wrist thru pain free ROM.    Min Type Additional Details    [] Estim:  []Unatt       []IFC  []Premod                        []Other:  []w/ice   []w/heat  Position:  Location:    [] Estim: []Att    []TENS instruct  []NMES                    []Other:  []w/US   []w/ice   []w/heat  Position:  Location:    []  Traction: [] Cervical       []Lumbar                       [] Prone          []Supine                       []Intermittent   []Continuous Lbs:  [] before manual  [] after manual    []  Ultrasound: []Continuous   [] Pulsed                           []1MHz   []3MHz Location:  W/cm2:    []  Iontophoresis with dexamethasone         Location: [] Take home patch   [] In clinic   10 []  Ice     [x]  heat  []  Ice massage  []  Laser   []  Anodyne Position:  Location: Right wrist    []  Laser with stim  []  Other: Position:  Location:    []  Vasopneumatic Device Pressure:       [] lo [] med [] hi   Temperature: [] lo [] med [] hi   [x] Skin assessment post-treatment:  [x]intact []redness- no adverse reaction    []redness  adverse reaction:   25 min Therapeutic Exercise: [x] See flow sheet :   Rationale: increase ROM and improve coordination to improve the patients ability to increase functional use of right wrist/hand. With   [x] TE   [] TA   [] neuro   [] other: Patient Education: [x] Review HEP    [] Progressed/Changed HEP based on:   [] positioning   [] body mechanics   [] transfers   [] heat/ice application   [] Splint wear/care   [] Sensory re-education   [] scar management      [] other:             Other Objective/Functional Measures: Range of Motion: Elbow/Wrist   Wrist 3/28/2018   Right/Left           Flex 62/90           Ext 54/65           UD 30/30           RD 20/20           FA              Pro 80/80           Sup 62/80              Strength:  Unable to test per CTR protocol.     Sensation: mild paresthesia of right MF       Edema: GIRTH CHART measured in cm  Date: 3/28/2018      Side R/L     DPC circum. 19.4/18. 2     Wrist Crease 15.2/14.9          ADLs  Feeding:                          []MaxA   []ModA   []Lm   [] CGA   []SBA   []Anahi   [x]Independent  UE Dressing:                   []MaxA   [x]ModA   []Lm   [] CGA   []SBA   []Anahi   []Independent  LE Dressing:                   []MaxA   [x]ModA   []Lm   [] CGA   []SBA   []Anahi   []Independent  Grooming:                       []MaxA   []ModA   [x]Lm   [] CGA   []SBA   []Anahi   []Independent  Toileting:                         []MaxA   []ModA   []Lm   [] CGA   []SBA   []Anahi   [x]Independent  Bathing:                           []MaxA   []ModA   []Lm   [] CGA   []SBA   []Anahi   [x]Independent  Light Meal Prep:    []MaxA   []ModA   [x]Lm   [] CGA   []SBA   []Anahi   []Independent  Household/Other: []MaxA   [x]ModA   []Lm   [] CGA   []SBA   []Anahi   []Independent  Adaptive Equip:     []MaxA   []ModA   []Lm   [] CGA   []SBA   []Anahi   []Independent  Driving:                            [x]MaxA   []ModA   []Lm   [] CGA   []SBA   []Anahi   []Independent       Pain Level (0-10 scale) post treatment: 2/10    ASSESSMENT/Changes in Function: Provided and instructed pt in tendon glides and composite flexion of digits with carryover demonstrated by pt. Pt continues to report paresthesia of right MF. Sees MD today for suture removal.    Patient will continue to benefit from skilled OT services to modify and progress therapeutic interventions, address ROM deficits and analyze and address soft tissue restrictions to attain remaining goals. [x]  See Plan of Care  []  See progress note/recertification  []  See Discharge Summary         Progress towards goals / Updated goals:  Short Term Goals: To be accomplished in 2  weeks:  1. Patient will be compliant with initial home exercise program to take an active role in their rehabilitation process. Goal Met 4/2/18  2. Patient will demonstrate a good understanding of their condition and strategies for self-management. Goal Met 4/2/18  Long Term Goals: To be accomplished in 4 weeks:                        1. Patient will have 60 degrees of right wrist extension in order to increase ease with ADL's such as bathing, eating and dressing and to eventually bear weight thru the right upper extremity as in pushing up from a chair. Status at eval: 54  2. Patient will have 70 degrees of right wrist flexion in order to perform hygiene tasks and /or work with tools such as a screwdriver. Status at eval: 62  3. Patient will have 70 degrees of right forearm supination in order to perform ADL's including washing face and accepting change. Status at eval: 62  4. Patient will report no difficulty carrying a shopping bag with right hand on FOTO outcome measure in order to demonstrate improved functional performance.   Status At Eval: unable to do    PLAN  []  Upgrade activities as tolerated     [x]  Continue plan of care  []  Update interventions per flow sheet       []  Discharge due to:_  []  Other:_      Gino Tan OT 4/2/2018  3:04 PM    Future Appointments  Date Time Provider Yoni Arguello   4/2/2018 4:00 PM Gurdeep Ray, [address] [facility]

## 2018-04-02 NOTE — PROGRESS NOTES
Jayson Miller  1970     HISTORY OF PRESENT ILLNESS  Jayson Miller is a 50 y.o. female who presents today for evaluation s/p right wrist endoscopic carpal tunnel release on 3/26/18. She is doing well. Has been going to OT. Notes improvement in pain but still has numbness in her fingers. Having a hard time flexing her middle finger. Pain is a 1/10. Patient denies any fever, chills, chest pain, shortness of breath or calf pain. There are no new illness or injuries to report since last seen in the office. No changes in medications, allergies, social or family history. PHYSICAL EXAM:   There were no vitals taken for this visit. The patient is a well-developed, well-nourished female in no acute distress. The patient is alert and oriented times three. The patient appears to be well groomed. Mood and affect are normal.  ORTHOPEDIC EXAM of right wrist:  Inspection: No swelling, no bruising  Incision, clean, dry, intact, sutures in place  Range of motion: unable to make a full fist actively, full PROM flexion  ttp none  Stability: Stable  Strength: n/a    IMPRESSION:      ICD-10-CM ICD-9-CM    1. Right carpal tunnel syndrome G56.01 354.0         PLAN:   1. Patient is doing well but is guarding her hand. Discussed it is ok to move her hand  2. Sutures removed steri strips applied  3. Discussed limiting her activities x 3 weeks to help decrease swelling. Discussed that it will take time for her symptoms to fully resolve.   4. Cont to work with OT - discussed with OT today on care being aggressive with rom    RTC 3 weeks  Follow-up Disposition: Not on [address], PA-C  [facility]

## 2018-04-02 NOTE — MR AVS SNAPSHOT
[address], [address] [facility] 
[phone] Patient: Jani Escobar MRN: [id number] :1970 Visit Information Date & Time Provider Department Dept. Phone Encounter #  
 2018  4:00 PM Blanca Fuller, [address] [facility] [address] [facility] [phone] [id number] Follow-up Instructions Return in about 3 weeks (around 2018). Routing History Follow-up and Disposition History Your Appointments 2018  4:00 PM  
POST OP with Jerry Hayes [facility] ([address]) Appt Note: RIGHT ENDOSCOPIC CARPAL TUNNEL RELEASE  
 [address] [facility]  
[phone] [address] [facility], [address]  
  
    
 2018  4:00 PM  
POST OP with Jerry Hayes [facility] ([address]) Appt Note: 3 wk f/u  
 [address] [facility]  
[phone] Upcoming Health Maintenance Date Due Pneumococcal 19-64 Medium Risk (1 of 1 - PPSV23) 1987 DTaP/Tdap/Td series (1 - Tdap) 1989 PAP AKA CERVICAL CYTOLOGY 1989 Influenza Age 5 to Adult 2017 Allergies as of 2018  Review Complete On: 2018 By: MARRY Hayes Not on File Current Immunizations  Never Reviewed No immunizations on file. Not reviewed this visit You Were Diagnosed With   
  
 Codes Comments Right carpal tunnel syndrome    -  Primary ICD-10-CM: G56.01 
ICD-9-CM: 354.0 Vitals OB Status Smoking Status Hysterectomy Current Every Day Smoker Preferred Pharmacy Pharmacy Name Phone [id number] Glenn Gage, [address] [facility] [facility] [phone] Your Updated Medication List  
  
   
 This list is accurate as of 4/2/18  3:54 PM.  Always use your most recent med list.  
  
  
  
  
 acetaminophen-codeine 300-30 mg per tablet Commonly known as:  TYLENOL #3  
TAKE 1 TABLET BY MOUTH EVERY 3-4 HOURS AS NEEDED FOR PAIN  
  
 ALLEGRA-D 12 HOUR  mg Tb12 Generic drug:  fexofenadine-pseudoephedrine Take 1 Tab by mouth every twelve (12) hours. amoxicillin 500 mg Tab TAKE 2 TABLETS BY MOUTH AT BEDTIME TONIGHT,THEN TAKE 1 TABLET BY MOUTH EVERY 6 HOURS UNTIL GONE  
  
 * CHANTIX CONTINUING MONTH YVONNE 1 mg tablet Generic drug:  varenicline Take 1 mg by mouth two (2) times daily (after meals). * CHANTIX STARTING MONTH BOX 0.5 mg (11)- 1 mg (42) Dspk Generic drug:  varenicline AS DIRECTED ORALLY  
  
 cyclobenzaprine 10 mg tablet Commonly known as:  FLEXERIL Take 1 Tab by mouth nightly as needed for Muscle Spasm(s). doxycycline 100 mg tablet Commonly known as:  VIBRA-TABS TAKE 1 TABLET BY MOUTH TWICE A DAY  
  
 gabapentin 600 mg tablet Commonly known as:  NEURONTIN Take 1 Tab by mouth four (4) times daily. * HYDROcodone-acetaminophen 5-325 mg per tablet Commonly known as:  NORCO  
  
 * HYDROcodone-acetaminophen 5-325 mg per tablet Commonly known as:  Michelle [place] Take 1 Tab by mouth every eight (8) hours as needed for Pain. Max Daily Amount: 3 Tabs. melatonin Tab tablet Take 10 mg by mouth nightly. methylPREDNISolone 4 mg tablet Commonly known as:  Blinda Creed Per dose pack instructions  
  
 oxyCODONE-acetaminophen  mg per tablet Commonly known as:  PERCOCET 10 Take 1 Tab by mouth every six (6) hours as needed. Max Daily Amount: 4 Tabs. traMADol 50 mg tablet Commonly known as:  ULTRAM  
Take 1 Tab by mouth every six (6) hours as needed for Pain. Max Daily Amount: 200 mg. Do not take until after surgery WELLBUTRIN  mg SR tablet Generic drug:  buPROPion SR Take 150 mg by mouth two (2) times a day. * Notice: This list has 4 medication(s) that are the same as other medications prescribed for you. Read the directions carefully, and ask your doctor or other care provider to review them with you. Follow-up Instructions Return in about 3 weeks (around 4/23/2018). To-Do List   
 04/06/2018 10:00 AM  
  Appointment with Tiffani Pa OT at [facility] PT [facility] ([phone]) Introducing [facility] SERVICES! Dear Bruno Service: Thank you for requesting a [employer] account. Our records indicate that you already have an active [employer] account. You can access your account anytime at [email or web address] Did you know that you can access your hospital and ER discharge instructions at any time in [employer]? You can also review all of your test results from your hospital stay or ER visit. Additional Information If you have questions, please visit the Frequently Asked Questions section of the [employer] website at [email or web address] Remember, [employer] is NOT to be used for urgent needs. For medical emergencies, dial 911. Now available from your iPhone and Android! Please provide this summary of care documentation to your next provider. Your primary care clinician is listed as Damian Thibodeaux. If you have any questions after today's visit, please call [phone].

## 2018-04-06 ENCOUNTER — HOSPITAL ENCOUNTER (OUTPATIENT)
Dept: PHYSICAL THERAPY | Age: 48
Discharge: HOME | End: 2018-04-06
Payer: COMMERCIAL

## 2018-04-06 PROCEDURE — 97110 THERAPEUTIC EXERCISES: CPT

## 2018-04-06 NOTE — PROGRESS NOTES
OT DAILY TREATMENT NOTE  3-16    Patient Name: Vaughn Waldrop  Date:2018  : 1970  [x]  Patient  Verified  Payor: BLUE CROSS / Plan: [facility] PPO / Product Type: PPO /    In time:1005  Out time:1045  Total Treatment Time (min): 40  Visit #: 3 of 8    Treatment Area: Right carpal tunnel syndrome [G56.01]    SUBJECTIVE  Pain Level (0-10 scale): 2-3/10  Any medication changes, allergies to medications, adverse drug reactions, diagnosis change, or new procedure performed?: [x] No    [] Yes (see summary sheet for update)  Subjective functional status/changes:   [] No changes reported  \"I still have throbbing and aching in my middle finger. \"    OBJECTIVE    Modality rationale: decrease pain and increase tissue extensibility to improve the patients ability to increase functional use of the right hand   Min Type Additional Details    [] Estim:  []Unatt       []IFC  []Premod                        []Other:  []w/ice   []w/heat  Position:  Location:    [] Estim: []Att    []TENS instruct  []NMES                    []Other:  []w/US   []w/ice   []w/heat  Position:  Location:    []  Traction: [] Cervical       []Lumbar                       [] Prone          []Supine                       []Intermittent   []Continuous Lbs:  [] before manual  [] after manual    []  Ultrasound: []Continuous   [] Pulsed                           []1MHz   []3MHz W/cm2:  Location:    []  Iontophoresis with dexamethasone         Location: [] Take home patch   [] In clinic   15 []  Ice     [x]  heat  []  Ice massage  []  Laser   []  Anodyne Position:resting  Location: right hand    []  Laser with stim  []  Other:  Position:  Location:    []  Vasopneumatic Device Pressure:       [] lo [] med [] hi   Temperature: [] lo [] med [] hi   [x] Skin assessment post-treatment:  [x]intact []redness- no adverse reaction    []redness  adverse reaction:     20 min Therapeutic Exercise:  [x] See flow sheet :   Rationale: increase ROM and increase strength to improve the patients ability to return to full functional use of the right hand with ADL and IADL tasks. 5 min Self Care/Home Management: splint wear, care and precautions. Rationale: increase ROM and increase strength  to improve the patients ability to use of the hand with all tasks    With   [] TE   [] TA   [] neuro   [] other: Patient Education: [x] Review HEP    [] Progressed/Changed HEP based on:   [] positioning   [] body mechanics   [] transfers   [] heat/ice application   [] Splint wear/care   [] Sensory re-education   [] scar management      [] other:             Other Objective/Functional Measures:Range of Motion: Elbow/Wrist   Wrist 3/28/2018   Right/Left               Flex 62/90               Ext 54/65               UD 30/30               RD 20/20               FA                   Pro 80/80               Sup 62/80                   Strength:  Unable to test per CTR protocol.      Sensation: mild paresthesia of right MF        Edema: GIRTH CHART measured in cm  Date: 3/28/2018       Side R/L      DPC circum. 19.4/18. 2      Wrist Crease 15.2/14.9             ADLs  Feeding:                          []MaxA   []ModA   []Lm   [] CGA   []SBA   []Anahi   [x]Independent  UE Dressing:                   []MaxA   [x]ModA   []Lm   [] CGA   []SBA   []Anahi   []Independent  LE Dressing:                   []MaxA   [x]ModA   []Lm   [] CGA   []SBA   []Anahi   []Independent  Grooming:                       []MaxA   []ModA   [x]Lm   [] CGA   []SBA   []Anahi   []Independent  Toileting:                         []MaxA   []ModA   []Lm   [] CGA   []SBA   []Anahi   [x]Independent  Bathing:                           []MaxA   []ModA   []Lm   [] CGA   []SBA   []Anahi   [x]Independent  Light Meal Prep:    []MaxA   []ModA   [x]Lm   [] CGA   []SBA   []Anahi   []Independent  Household/Other: []MaxA   [x]ModA   []Lm   [] CGA   []SBA   []Anahi   []Independent  Adaptive Equip:     []MaxA   []ModA   []Lm   [] CGA   []SBA   []Anahi   []Independent  Driving:                            [x]MaxA   []ModA   []Lm   [] CGA   []SBA   []Anahi   []Independent                             Pain Level (0-10 scale) post treatment: 2/10     ASSESSMENT/Changes in Function: Median nerve glides added to reduce nerve symptoms. Encouraged to increase functional AROM of the digits as patient is avoiding flexion.     Patient will continue to benefit from skilled OT services to modify and progress therapeutic interventions, address ROM deficits and analyze and address soft tissue restrictions to attain remaining goals.      [x]  See Plan of Care  []  See progress note/recertification  []  See Discharge Summary      Progress towards goals / Updated goals:  Short Term Goals: To be accomplished in 2  weeks:  1. Patient will be compliant with initial home exercise program to take an active role in their rehabilitation process. Goal Met 4/2/18  2. Patient will demonstrate a good understanding of their condition and strategies for self-management. Goal Met 4/2/18  Long Term Goals: To be accomplished in 4 weeks:                        1. Patient will have 60 degrees of right wrist extension in order to increase ease with ADL's such as bathing, eating and dressing and to eventually bear weight thru the right upper extremity as in pushing up from a chair. Status at eval: 54  2. Patient will have 70 degrees of right wrist flexion in order to perform hygiene tasks and /or work with tools such as a screwdriver. Status at eval: 62  3. Patient will have 70 degrees of right forearm supination in order to perform ADL's including washing face and accepting change. Status at eval: 62  4. Patient will report no difficulty carrying a shopping bag with right hand on FOTO outcome measure in order to demonstrate improved functional performance.   Status At Eval: unable to do       PLAN  []  Upgrade activities as tolerated     [x]  Continue plan of care  []  Update interventions per flow sheet       []  Discharge due to:_  []  Other:_      Paola Ann OT 4/6/2018  10:45 AM    Future Appointments  Date Time Provider Yoni Arguello   4/23/2018 4:00 PM MARRY Winkler [address] [facility]

## 2018-04-09 ENCOUNTER — APPOINTMENT (OUTPATIENT)
Dept: PHYSICAL THERAPY | Age: 48
End: 2018-04-09
Payer: COMMERCIAL

## 2018-04-11 ENCOUNTER — HOSPITAL ENCOUNTER (OUTPATIENT)
Dept: PHYSICAL THERAPY | Age: 48
Discharge: HOME | End: 2018-04-11
Payer: COMMERCIAL

## 2018-04-11 PROCEDURE — 97035 APP MDLTY 1+ULTRASOUND EA 15: CPT

## 2018-04-11 PROCEDURE — 97110 THERAPEUTIC EXERCISES: CPT

## 2018-04-11 NOTE — PROGRESS NOTES
OT DAILY TREATMENT NOTE - non [facility] 3-16    Patient Name: Renetta Alanis  Date:2018  : 1970  [x]  Patient  Verified  Payor: BLUE CROSS / Plan: [facility] / Product Type: PPO /    In time: 3:55  Out time:4:45  Total Treatment Time (min): 50  Visit #: 4 of 6    Treatment Area: Right carpal tunnel syndrome [G56.01]    SUBJECTIVE  Pain Level (0-10 scale): 3/10  Any medication changes, allergies to medications, adverse drug reactions, diagnosis change, or new procedure performed?: [x] No    [] Yes (see summary sheet for update)  Subjective functional status/changes:   [] No changes reported  \"It's really sore and I get shooting pains through my middle finger. \"    OBJECTIVE  Modality rationale: decrease edema, decrease pain and increase tissue extensibility to improve the patients ability to prepare for functional use of right hand.    Min Type Additional Details    [] Estim:  []Unatt       []IFC  []Premod                        []Other:  []w/ice   []w/heat  Position:  Location:    [] Estim: []Att    []TENS instruct  []NMES                    []Other:  []w/US   []w/ice   []w/heat  Position:  Location:    []  Traction: [] Cervical       []Lumbar                       [] Prone          []Supine                       []Intermittent   []Continuous Lbs:  [] before manual  [] after manual   10 [x]  Ultrasound: []Continuous   [x] Pulsed 50%                           []1MHz   [x]3MHz Location: Right MF  W/cm2: 1.0    []  Iontophoresis with dexamethasone         Location: [] Take home patch   [] In clinic   15 []  Ice     [x]  heat  []  Ice massage  []  Laser   []  Anodyne Position:  Location: right hand    []  Laser with stim  []  Other: Position:  Location:    []  Vasopneumatic Device Pressure:       [] lo [] med [] hi   Temperature: [] lo [] med [] hi   [x] Skin assessment post-treatment:  [x]intact []redness- no adverse reaction    []redness  adverse reaction:     25 min Therapeutic Exercise:  [x] See flow sheet :   Rationale: increase ROM and improve coordination to improve the patients ability to increase use of right hand for functional tasks. With   [x] TE   [] TA   [] neuro   [] other: Patient Education: [x] Review HEP    [] Progressed/Changed HEP based on:   [] positioning   [] body mechanics   [] transfers   [] heat/ice application   [] Splint wear/care   [] Sensory re-education   [] scar management      [] other:             Other Objective/Functional Measures:Range of Motion: Elbow/Wrist   Wrist 3/28/2018   Right/Left               Flex 62/90               Ext 54/65               UD 30/30               RD 20/20               FA                   Pro 80/80               Sup 62/80                 Edema: GIRTH CHART measured in cm  Date: 3/28/2018       Side R/L      DPC circum. 19.4/18. 2      Wrist Crease 15.2/14.9             Pain Level (0-10 scale) post treatment: 2/10    ASSESSMENT/Changes in Function: Pt provided and instructed in home desensitization program for right wrist incision due to c/o sensitivity. Pt continued to present with avoiding finger flexion and holding right hand in guarded position. Pt tolerated US to right MF stating it did not hurt as bad to make a fist after. Patient will continue to benefit from skilled OT services to modify and progress therapeutic interventions, address ROM deficits, address strength deficits and analyze and address soft tissue restrictions to attain remaining goals. [x]  See Plan of Care  []  See progress note/recertification  []  See Discharge Summary         Progress towards goals / Updated goals:  Short Term Goals: To be accomplished in 2  weeks:  1. Patient will be compliant with initial home exercise program to take an active role in their rehabilitation process. Goal Met 4/2/18  2. Patient will demonstrate a good understanding of their condition and strategies for self-management.  Goal Met 4/2/18  Long Term Goals: To be accomplished in [address] [place] [address] Road  1. Patient will have 60 degrees of right wrist extension in order to increase ease with ADL's such as bathing, eating and dressing and to eventually bear weight thru the right upper extremity as in pushing up from a chair. Status at eval: 54  2. Patient will have 70 degrees of right wrist flexion in order to perform hygiene tasks and /or work with tools such as a screwdriver. Status at eval: 62  3. Patient will have 70 degrees of right forearm supination in order to perform ADL's including washing face and accepting change. Status at eval: 62  4. Patient will report no difficulty carrying a shopping bag with right hand on FOTO outcome measure in order to demonstrate improved functional performance.   Status At Eval: unable to do    PLAN  []  Upgrade activities as tolerated     [x]  Continue plan of care  []  Update interventions per flow sheet       []  Discharge due to:_  []  Other:_      Galina Rojas OT 4/11/2018  3:54 PM    Future Appointments  Date Time Provider Yoni Arguello   4/11/2018 4:30 PM Galina Rojas OT [id number]   4/23/2018 9:30 AM MARRY Luong Tobin 67   4/23/2018 10:30 AM Irma You OT [id number]   5/30/2018 3:30 PM LUIS Bray Tobin 67   6/12/2018 1:00 PM LUIS Bray Tobin 67

## 2018-04-23 ENCOUNTER — HOSPITAL ENCOUNTER (OUTPATIENT)
Dept: PHYSICAL THERAPY | Age: 48
Discharge: HOME | End: 2018-04-23
Payer: COMMERCIAL

## 2018-04-23 ENCOUNTER — OFFICE VISIT (OUTPATIENT)
Dept: ORTHOPEDIC SURGERY | Facility: CLINIC | Age: 48
End: 2018-04-23

## 2018-04-23 VITALS
RESPIRATION RATE: 16 BRPM | WEIGHT: 192.2 LBS | HEIGHT: 62 IN | HEART RATE: 85 BPM | TEMPERATURE: 96.6 F | OXYGEN SATURATION: 96 % | DIASTOLIC BLOOD PRESSURE: 75 MMHG | SYSTOLIC BLOOD PRESSURE: 121 MMHG | BODY MASS INDEX: 35.37 KG/M2

## 2018-04-23 DIAGNOSIS — Z98.890 S/P CARPAL TUNNEL RELEASE: Primary | ICD-10-CM

## 2018-04-23 PROCEDURE — 97022 WHIRLPOOL THERAPY: CPT

## 2018-04-23 PROCEDURE — 97110 THERAPEUTIC EXERCISES: CPT

## 2018-04-23 NOTE — PROGRESS NOTES
OT DAILY TREATMENT NOTE  3-16    Patient Name: Kelly Catalan  Date:2018  : 1970  [x]  Patient  Verified  Payor: BLUE CROSS / Plan: Micah Nicole [id number] PPO / Product Type: PPO /    In time:1035 Out time:1120  Total Treatment Time (min):45  Visit #: 5 of 8    Treatment Area: Right carpal tunnel syndrome [G56.01]    SUBJECTIVE  Pain Level (0-10 scale): 1/10  Any medication changes, allergies to medications, adverse drug reactions, diagnosis change, or new procedure performed?: [x] No    [] Yes (see summary sheet for update)  Subjective functional status/changes:   [x] No changes reported      OBJECTIVE    Modality rationale: desensitization of the right hand to improve the patients ability to use with normal activities. Min Type Additional Details    [] Estim:  []Unatt       []IFC  []Premod                        []Other:  []w/ice   []w/heat  Position:  Location:    [] Estim: []Att    []TENS instruct  []NMES                    []Other:  []w/US   []w/ice   []w/heat  Position:  Location:    []  Traction: [] Cervical       []Lumbar                       [] Prone          []Supine                       []Intermittent   []Continuous Lbs:  [] before manual  [] after manual    []  Ultrasound: []Continuous   [] Pulsed                           []1MHz   []3MHz W/cm2:  Location:    []  Iontophoresis with dexamethasone         Location: [] Take home patch   [] In clinic   20 []  Ice     [x]  Heat: Fluido  []  Ice massage  []  Laser   []  Anodyne Position: AROM  Location: right hand    []  Laser with stim  []  Other:  Position:  Location:    []  Vasopneumatic Device Pressure:       [] lo [] med [] hi   Temperature: [] lo [] med [] hi   [x] Skin assessment post-treatment:  []intact []redness- no adverse reaction    []redness  adverse reaction:     25  Therapeutic Exercise:  [x] See flow sheet :   Rationale: increase ROM, improve coordination, increase proprioception and desensitization.  to improve the patients ability to return to full use. With   [] TE   [] TA   [] neuro   [] other: Patient Education: [x] Review HEP    [] Progressed/Changed HEP based on:   [] positioning   [] body mechanics   [] transfers   [] heat/ice application   [] Splint wear/care   [] Sensory re-education   [] scar management      [] other:             Other Objective/Functional Measures: Range of Motion: Elbow/Wrist   Wrist 3/28/2018   Right/Left               Flex 62/90               Ext 54/65               UD 30/30               RD 20/20               FA                   Pro 80/80               Sup 62/80                  Edema: GIRTH CHART measured in cm  Date: 3/28/2018       Side R/L      DPC circum. 19.4/18. 2      Wrist Crease 15.2/14.9             Pain Level (0-10 scale) post treatment: 2/10     ASSESSMENT/Changes in Function: intitiated strengthening today.     Patient will continue to benefit from skilled OT services to modify and progress therapeutic interventions, address ROM deficits, address strength deficits and analyze and address soft tissue restrictions to attain remaining goals.      [x]  See Plan of Care  [x]  See progress note/recertification  []  See Discharge Summary      Progress towards goals / Updated goals:  Short Term Goals: To be accomplished in 2  weeks:  1. Patient will be compliant with initial home exercise program to take an active role in their rehabilitation process. Goal Met 4/2/18  2. Patient will demonstrate a good understanding of their condition and strategies for self-management. Goal Met 4/2/18  Long Term Goals: To be accomplished in 4 weeks:                        1. Patient will have 60 degrees of right wrist extension in order to increase ease with ADL's such as bathing, eating and dressing and to eventually bear weight thru the right upper extremity as in pushing up from a chair.   Status at [facility]: 54  2. Patient will have 70 degrees of right wrist flexion in order to perform hygiene tasks and /or work with tools such as a screwdriver. Status at eval: 62  3. Patient will have 70 degrees of right forearm supination in order to perform ADL's including washing face and accepting change. Status at eval: 62  4. Patient will report no difficulty carrying a shopping bag with right hand on FOTO outcome measure in order to demonstrate improved functional performance.   Status At Eval: unable to do    PLAN  []  Upgrade activities as tolerated     [x]  Continue plan of care  []  Update interventions per flow sheet       []  Discharge due to:_  []  Other:_      Toña Jordan OT 4/23/2018  11:24 AM    Future Appointments  Date Time Provider Yoni Arguello   4/26/2018 4:30 PM Lavada Perone, OT [facility]   4/30/2018 4:00 PM Lavada Perone, OT [facility]   5/2/2018 4:30 PM Lavada Perone, OT [facility]   5/7/2018 4:00 PM Lavada Perone, OT [facility]   5/14/2018 4:00 PM Lavada Perone, OT [facility]   5/22/2018 3:00 PM MARRY Raphael [facility] SCHED   5/30/2018 3:30 PM Cresencio Jeans, PA-C [address]   6/12/2018 1:00 PM Cresencio Jeans, PA-C [address]

## 2018-04-23 NOTE — PROGRESS NOTES
Patricio Harper  1970     HISTORY OF PRESENT ILLNESS  Patricio Harper is a 50 y.o. female who presents today for evaluation s/p right wrist endoscopic carpal tunnel release on 3/26/18. She is doing well. Has been going to OT and feels she makes improvements. She is gardening and doing more on a week to week basis. Still has numbness in her fingers. Pain is a 2/10. Patient denies any fever, chills, chest pain, shortness of breath or calf pain. There are no new illness or injuries to report since last seen in the office. No changes in medications, allergies, social or family history. PHYSICAL EXAM:   Visit Vitals    /75    Pulse 85    Temp 96.6 °F (35.9 °C) (Oral)    Resp 16    Ht 5' 2\" (1.575 m)    Wt 192 lb 3.2 oz (87.2 kg)    SpO2 96%    BMI 35.15 kg/m2      The patient is a well-developed, well-nourished female in no acute distress. The patient is alert and oriented times three. The patient appears to be well groomed. Mood and affect are normal.  ORTHOPEDIC EXAM of right wrist:  Inspection: No swelling, no bruising  Incision well healed  Range of motion: unable to make a full fist actively but can passive make a full fist.  ttp none  Stability: Stable  Strength: n/a    IMPRESSION:      ICD-10-CM ICD-9-CM    1. S/P carpal tunnel release Z98.890 V45.89 REFERRAL TO OCCUPATIONAL THERAPY        PLAN:   1. Pt is doing well post operatively  2. Still having some numbness and tingling. 3. She will slowly progress her activities. Discussed that it will take time for her symptoms to fully resolve. 4. Cont to work with OT given a new order today.  Will transition to HEP when ready    RTC 4 weeks  Follow-up Disposition: Not on [facility], [facility] and Sp[facility]ist

## 2018-04-26 ENCOUNTER — APPOINTMENT (OUTPATIENT)
Dept: PHYSICAL THERAPY | Age: 48
End: 2018-04-26
Payer: COMMERCIAL

## 2018-04-27 RX ORDER — CYCLOBENZAPRINE HCL 10 MG
TABLET ORAL
Qty: 30 TAB | Refills: 1 | Status: SHIPPED | OUTPATIENT
Start: 2018-04-27

## 2018-04-27 NOTE — TELEPHONE ENCOUNTER
Pt will need to schedule a f/o w/ a NP for routine medication management for future refills.  I sent 2 mo supply to pharmacy

## 2018-04-27 NOTE — TELEPHONE ENCOUNTER
-called patient and voicemail did not identify patient. A message was left for patient to call [phone] at [address] [place] [address] in regards to the patient's message.    -patient medication sent to pharmacy and patient is going to need to make a follow up appointment for future refills.

## 2018-04-30 ENCOUNTER — HOSPITAL ENCOUNTER (OUTPATIENT)
Dept: PHYSICAL THERAPY | Age: 48
Discharge: HOME | End: 2018-04-30
Payer: COMMERCIAL

## 2018-04-30 PROCEDURE — 97110 THERAPEUTIC EXERCISES: CPT

## 2018-04-30 NOTE — PROGRESS NOTES
[facility]  [address]  [place], [address]  [phone] fax    Occupational Therapy Physician Update  [x] Progress Note  [] Discharge Summary  Patient name: Joel Chavez Start of Care: 3/28/2018   Referral source: Michelle Wells,* : 1970   Medical/Treatment Diagnosis: Right carpal tunnel syndrome [G56.01] Onset Date:3/26/2018     Prior Hospitalization: see medical history Provider#: [id number]   Medications: Verified on Patient Summary List    Comorbidities: tobacco use  Prior Level of Function:Able to perform ADL and IADL tasks without functional limitations. Visits from Start of Care: 6    Missed Visits: 1    Status at Evaluation/Last Progress Note: Range of Motion: Elbow/Wrist   Wrist 3/28/2018   Right/Left 2018   Right            Flex 62/90   0-90            Ext 54/65   0-68            UD 30/30               RD 20/20               FA                   Pro 80/80               Sup 62/80  0-80                  Measurements: Taken with Juan Dynamometer, in Lbs   Level 2 2018  Date Date Date Date Date Date   Right 20               Left 55               Deficit 35               Change                      Pinch Measurements: Taken with Pinch Gauge, in Lbs   (hand) 2018  Date Date Date Date Date   Lateral                Right 15             Left  17             Deficit 2             Change               Pad               Right 10             Left 12             Deficit 2             Change               Chato               Right 10             Left 15             Deficit 5             Change                   Edema: GIRTH CHART measured in cm  Date: 3/28/2018       Side R/L      DPC circum. 19.4/18. 2      Wrist Crease 15.2/14.9          Progress towards Goals: Short Term Goals:  To be accomplished in 2  weeks:  1. Patient will be compliant with initial home exercise program to take an active role in their rehabilitation process. Goal Met 4/2/18  2. Patient will demonstrate a good understanding of their condition and strategies for self-management. Goal Met 4/2/18  Long Term Goals: To be accomplished in 4 weeks:                        1. Patient will have 60 degrees of right wrist extension in order to increase ease with ADL's such as bathing, eating and dressing and to eventually bear weight thru the right upper extremity as in pushing up from a chair. Goal Met 4/30/18  Status at eval: 54  Status at note: 76  2. Patient will have 70 degrees of right wrist flexion in order to perform hygiene tasks and /or work with tools such as a screwdriver. Goal Met 4/30/18  Status at eval: 62  Status at note: 90  3. Patient will have 70 degrees of right forearm supination in order to perform ADL's including washing face and accepting change. Goal Met 4/30/18  Status at eval: 62  Status at note: 80  4. Patient will report no difficulty carrying a shopping bag with right hand on FOTO outcome measure in order to demonstrate improved functional performance. Progressing 4/30/18  Status At Eval: unable to do  Goals: to be achieved in 4 weeks: 1. Patient will report no difficulty carrying a shopping bag with right hand on FOTO outcome measure in order to demonstrate improved functional performance. Status At Eval: unable to do   2. Patient will have 45 pounds of  in the right hand to allow for functional grasp for all ADL activities including dressing, bathing and self care. Status at note: 35  3. Patient will have improved right pipe pinch strength of 13 pounds in order to perform fine motor tasks such as writing.   Status at note: 10    ASSESSMENT/RECOMMENDATIONS:  [x]Continue therapy per initial plan/protocol at a frequency of  2 x per week for 4 weeks  []Continue therapy with the following recommended changes:_____________________      _____________________________________________________________________  []Discontinue therapy progressing towards or have reached established goals  []Discontinue therapy due to lack of appreciable progress towards goals  []Discontinue therapy due to lack of attendance or compliance  []Await Physician's recommendations/decisions regarding therapy  []Other:________________________________________________________________    Thank you for this referral. Priscilla Cagle OT 4/30/2018 4:18 PM  NOTE TO PHYSICIAN:  [address]   FAX TO [facility] [place] [facility]: [phone]  If you are unable to process this request in 24 hours please contact our office: [phone]    ? I have read the above report and request that my patient continue as recommended. ? I have read the above report and request that my patient continue therapy with the following changes/special instructions:___________________________________________________________  ? I have read the above report and request that my patient be discharged from therapy.     Physicians signature: ______________________________Date: ______Time:______

## 2018-04-30 NOTE — PROGRESS NOTES
OT DAILY TREATMENT NOTE - non [facility] 3-16    Patient Name: Osito Henry  Date:2018  : 1970  [x]  Patient  Verified  Payor: BLUE CROSS / Plan: [facility] PPO / Product Type: PPO /    In time: 4:00  Out time:4:40  Total Treatment Time (min): 40  Visit #: 6 of 6    Treatment Area: Right carpal tunnel syndrome [G56.01]    SUBJECTIVE  Pain Level (0-10 scale): 0/10  Any medication changes, allergies to medications, adverse drug reactions, diagnosis change, or new procedure performed?: [x] No    [] Yes (see summary sheet for update)  Subjective functional status/changes:   [] No changes reported  \"My middle finger is still stiff but I wouldn't say I have had pain. \"    OBJECTIVE   40 min Therapeutic Exercise:  [x] See flow sheet :   Rationale: increase ROM, increase strength and improve coordination to improve the patients ability to increase use of right hand for functional tasks.     With   [x] TE   [] TA   [] neuro   [] other: Patient Education: [x] Review HEP    [] Progressed/Changed HEP based on:   [] positioning   [] body mechanics   [] transfers   [] heat/ice application   [] Splint wear/care   [] Sensory re-education   [] scar management      [] other:             Other Objective/Functional Measures: Range of Motion: Elbow/Wrist   Wrist 3/28/2018   Right/Left 2018   Right            Flex 62/90   0-90            Ext 54/65   0-68            UD 30/30               RD 20/20               FA                   Pro 80/80               Sup 62/80  0-80                  Measurements: Taken with Juan Dynamometer, in Lbs   Level 2 2018  Date Date Date Date Date Date   Right 20         Left 55         Deficit 35         Change                Pinch Measurements: Taken with Pinch Gauge, in Lbs   (hand) 2018  Date Date Date Date Date   Lateral          Right 15        Left  17        Deficit 2        Change         Pad         Right 10        Left 12        Deficit 2 Change         Chato         Right 10        Left 15        Deficit 5        Change             Edema: GIRTH CHART measured in cm  Date: 3/28/2018       Side R/L      DPC circum. 19.4/18. 2      Wrist Crease 15.2/14.9            Pain Level (0-10 scale) post treatment: 0/10    ASSESSMENT/Changes in Function: Patient has met ROM goals and tolerated assessment of strength. Pt educated and instructed in  and pinch strengthening ex's and tolerated this well with no increase in pain. Patient will continue to benefit from skilled OT services to modify and progress therapeutic interventions, address ROM deficits and address strength deficits to attain remaining goals. []  See Plan of Care  [x]  See progress note/recertification  []  See Discharge Summary         Progress towards goals / Updated goals:  Short Term Goals: To be accomplished in 2  weeks:  1. Patient will be compliant with initial home exercise program to take an active role in their rehabilitation process. Goal Met 4/2/18  2. Patient will demonstrate a good understanding of their condition and strategies for self-management. Goal Met 4/2/18  Long Term Goals: To be accomplished in 4 weeks:                        1. Patient will have 60 degrees of right wrist extension in order to increase ease with ADL's such as bathing, eating and dressing and to eventually bear weight thru the right upper extremity as in pushing up from a chair. Goal Met 4/30/18  Status at eval: 54  Status at note: 76  2. Patient will have 70 degrees of right wrist flexion in order to perform hygiene tasks and /or work with tools such as a screwdriver. Goal Met 4/30/18  Status at eval: 62  Status at note: 90  3. Patient will have 70 degrees of right forearm supination in order to perform ADL's including washing face and accepting change. Goal Met 4/30/18  Status at eval: 62  Status at note: 80  4.  Patient will report no difficulty carrying a shopping bag with right hand on FOTO outcome measure in order to demonstrate improved functional performance. Progressing 4/30/18  Status At Eval: unable to do  Goals: to be achieved in 4 weeks: 1. Patient will report no difficulty carrying a shopping bag with right hand on FOTO outcome measure in order to demonstrate improved functional performance. Status At Eval: unable to do   2. Patient will have 45 pounds of  in the right hand to allow for functional grasp for all ADL activities including dressing, bathing and self care. Status at note: 35  3. Patient will have improved right pipe pinch strength of 13 pounds in order to perform fine motor tasks such as writing.   Status at note: 10    PLAN  []  Upgrade activities as tolerated     [x]  Continue plan of care  []  Update interventions per flow sheet       []  Discharge due to:_  []  Other:_      Khris Currie OT 4/30/2018  3:59 PM    Future Appointments  Date Time Provider Yoni Arguello   4/30/2018 4:00 PM [facility]   5/2/2018 4:30 PM Khris Currie OT [facility]   5/7/2018 4:00 PM Khris Currie OT [facility]   5/14/2018 4:00 PM Khris Currie, OT [facility]   5/22/2018 3:00 PM Emanuel Brunner, PA [facility]   5/30/2018 3:30 PM LUIS Villaseñor [address]   6/12/2018 1:00 PM LUIS Villaseñor [address]

## 2018-05-02 ENCOUNTER — HOSPITAL ENCOUNTER (OUTPATIENT)
Dept: PHYSICAL THERAPY | Age: 48
Discharge: HOME | End: 2018-05-02
Payer: COMMERCIAL

## 2018-05-02 PROCEDURE — 97110 THERAPEUTIC EXERCISES: CPT

## 2018-05-02 NOTE — PROGRESS NOTES
OT DAILY TREATMENT NOTE - non [facility] 3-16    Patient Name: Lala Nowak  Date:2018  : 1970  [x]  Patient  Verified  Payor: BLUE CROSS / Plan: [facility] PPO / Product Type: PPO /    In time:4:22  Out time:5:05  Total Treatment Time (min): 43  Visit #: 1 of 8    Treatment Area: Right carpal tunnel syndrome [G56.01]    SUBJECTIVE  Pain Level (0-10 scale): 0/10  Any medication changes, allergies to medications, adverse drug reactions, diagnosis change, or new procedure performed?: [x] No    [] Yes (see summary sheet for update)  Subjective functional status/changes:   [x] No changes reported    OBJECTIVE  43 min Therapeutic Exercise:  [x] See flow sheet :   Rationale: increase ROM, increase strength and improve coordination to improve the patients ability to increase use of right hand for functional tasks.     With   [x] TE   [] TA   [] neuro   [] other: Patient Education: [x] Review HEP    [] Progressed/Changed HEP based on:   [] positioning   [] body mechanics   [] transfers   [] heat/ice application   [] Splint wear/care   [] Sensory re-education   [] scar management      [] other:             Other Objective/Functional Measures: Range of Motion: Elbow/Wrist   Wrist 3/28/2018   Right/Left 2018   Right            Flex 62/90   0-90            Ext 54/65   0-68            UD                RD 20/               FA                   Pro 80/80               Sup 62/80  0-80                  Measurements: Taken with Juan Dynamometer, in Lbs   Level 2 2018  Date Date Date Date Date Date   Right 20               Left 55               Deficit 35               Change                      Pinch Measurements: Taken with Pinch Gauge, in Lbs   (hand) 2018  Date Date Date Date Date   Lateral                Right 15             Left  17             Deficit 2             Change               Pad               Right 10             Left 12             Deficit 2             Change               Pipe               Right 10             Left 15             Deficit 5             Change                     Edema: GIRTH CHART measured in cm  Date: 3/28/2018       Side R/L      DPC circum. 19.4/18. 2      Wrist Crease 15.2/14.9               Pain Level (0-10 scale) post treatment: 0/10    ASSESSMENT/Changes in Function: Patient tolerated progression of strengthening ex's well today. Patient will continue to benefit from skilled OT services to modify and progress therapeutic interventions, address ROM deficits and address strength deficits to attain remaining goals. []  See Plan of Care  [x]  See progress note/recertification  []  See Discharge Summary         Progress towards goals / Updated goals:  Goals: to be achieved in 4 weeks: 1. Patient will report no difficulty carrying a shopping bag with right hand on FOTO outcome measure in order to demonstrate improved functional performance. Status At Eval: unable to do   2. Patient will have 45 pounds of  in the right hand to allow for functional grasp for all ADL activities including dressing, bathing and self care. Status at note: 35  3. Patient will have improved right pipe pinch strength of 13 pounds in order to perform fine motor tasks such as writing.   Status at note: 10    PLAN  []  Upgrade activities as tolerated     [x]  Continue plan of care  []  Update interventions per flow sheet       []  Discharge due to:_  []  Other:_      Maral Wiseman OT 5/2/2018  5:03 PM    Future Appointments  Date Time Provider Yoni Arguello   5/7/2018 4:00 PM Maral Wiseman OT [facility]   5/14/2018 4:00 PM [facility]   5/22/2018 3:00 PM MARRY Don[facility]   5/30/2018 3:30 PM LUIS Hernandez [address]   6/12/2018 1:00 PM LUIS Hernandez [address]

## 2018-05-07 ENCOUNTER — APPOINTMENT (OUTPATIENT)
Dept: PHYSICAL THERAPY | Age: 48
End: 2018-05-07
Payer: COMMERCIAL

## 2018-05-10 ENCOUNTER — HOSPITAL ENCOUNTER (OUTPATIENT)
Dept: PHYSICAL THERAPY | Age: 48
Discharge: HOME | End: 2018-05-10
Payer: COMMERCIAL

## 2018-05-10 PROCEDURE — 97110 THERAPEUTIC EXERCISES: CPT

## 2018-05-10 NOTE — PROGRESS NOTES
OT DAILY TREATMENT NOTE - non [facility] 3-16    Patient Name: Kenny Anderson  Date:5/10/2018  : 1970  [x]  Patient  Verified  Payor: BLUE CROSS / Plan: [facility] PPO / Product Type: PPO /    In time: 3:30  Out time: 4:20  Total Treatment Time (min): 50  Visit #: 2 of 8    Treatment Area: Right carpal tunnel syndrome [G56.01]    SUBJECTIVE  Pain Level (0-10 scale): 0/10  Any medication changes, allergies to medications, adverse drug reactions, diagnosis change, or new procedure performed?: [x] No    [] Yes (see summary sheet for update)  Subjective functional status/changes:   [] No changes reported  \"I drove a lot recently and the middle finger swelled up. \"    OBJECTIVE   Modality rationale: decrease edema, decrease inflammation, decrease pain and increase tissue extensibility to improve the patients ability to move right wrist thru pain-free ROM tasks.    Min Type Additional Details    [] Estim:  []Unatt       []IFC  []Premod                        []Other:  []w/ice   []w/heat  Position:  Location:    [] Estim: []Att    []TENS instruct  []NMES                    []Other:  []w/US   []w/ice   []w/heat  Position:  Location:    []  Traction: [] Cervical       []Lumbar                       [] Prone          []Supine                       []Intermittent   []Continuous Lbs:  [] before manual  [] after manual    []  Ultrasound: []Continuous   [] Pulsed                           []1MHz   []3MHz Location:  W/cm2:    []  Iontophoresis with dexamethasone         Location: [] Take home patch   [] In clinic   15 []  Ice     [x]  Heat Fluido  []  Ice massage  []  Laser   []  Anodyne Position: AROM  Location: right hand/wrist    []  Laser with stim  []  Other: Position:  Location:    []  Vasopneumatic Device Pressure:       [] lo [] med [] hi   Temperature: [] lo [] med [] hi   [x] Skin assessment post-treatment:  [x]intact [x]redness- no adverse reaction    []redness  adverse reaction:   35 min Therapeutic Exercise:  [x] See flow sheet :   Rationale: increase ROM, increase strength and improve coordination to improve the patients ability to return to PLOF and increase  and pinch tasks. With   [x] TE   [] TA   [] neuro   [] other: Patient Education: [x] Review HEP    [] Progressed/Changed HEP based on:   [] positioning   [] body mechanics   [] transfers   [] heat/ice application   [] Splint wear/care   [] Sensory re-education   [] scar management      [] other:             Other Objective/Functional Measures: Range of Motion: Elbow/Wrist   Wrist 3/28/2018   Right/Left 4/30/2018     Right            Flex 62/90   0-90            Ext 54/65   0-68            UD 30/30               RD 20/20               FA                   Pro 80/80               Sup 62/80  0-80                  Measurements: Taken with Juan Dynamometer, in Lbs  [place] 2 4/30/2018  Date Date Date Date Date Date   Right 20                     Left 54                     Deficit 35                     Change       [place]                 Pinch Measurements: Taken with Pinch Gauge, in Lbs   (hand) 4/30/2018  Date Date Date Date Date   Lateral                      Right 15                  Left  17                  Deficit 2                  Change                     Pad                     Right 10                  Left 12                  Deficit 2                  Change                     [place]                     Right 10                  Left 15                  Deficit 5    [place]         Change                           Edema: GIRTH CHART measured in cm  Date: 3/28/2018       Side R/L      DPC circum. 19.4/18. 2      Wrist Crease 15.2/14.9          Pain Level (0-10 scale) post treatment: 0/10    ASSESSMENT/Changes in Function: Pt tolerated therex well and reported decreased right middle finger stiffness after. Will provide updated  and pinch strengthening HEP at next appt.     Patient will continue to benefit from skilled OT services to modify and progress therapeutic interventions, address ROM deficits, address strength deficits and analyze and address soft tissue restrictions to attain remaining goals. []  See Plan of Care  [x]  See progress note/recertification  []  See Discharge Summary         Progress towards goals / Updated goals:  Goals: to be achieved in 4 weeks: 1. Patient will report no difficulty carrying a shopping bag with right hand on FOTO outcome measure in order to demonstrate improved functional performance. Status At Eval: unable to do   2. Patient will have 45 pounds of  in the right hand to allow for functional grasp for all ADL activities including dressing, bathing and self care. Status at note: 28  3. Patient will have improved right pipe pinch strength of 13 pounds in order to perform fine motor tasks such as writing.   Status at note: 10    PLAN  [x]  Upgrade activities as tolerated     [x]  Continue plan of care  []  Update interventions per flow sheet       []  Discharge due to:_  []  Other:_      Priscilla Cagle OT 5/10/2018  3:45 PM    Future Appointments  Date Time Provider Yoni Arguello   5/14/2018 4:00 PM Priscilla Cagle OT [id number]   5/22/2018 3:00 PM MARRY Lange [facility]   5/30/2018 3:30 PM LUIS Ma 67   6/12/2018 1:00 PM LUIS Ma Tobin 67

## 2018-05-14 ENCOUNTER — HOSPITAL ENCOUNTER (OUTPATIENT)
Dept: PHYSICAL THERAPY | Age: 48
Discharge: HOME | End: 2018-05-14
Payer: COMMERCIAL

## 2018-05-14 PROCEDURE — 97110 THERAPEUTIC EXERCISES: CPT

## 2018-05-14 NOTE — PROGRESS NOTES
OT DAILY TREATMENT NOTE - non [facility] 3-16    Patient Name: Joel Chavez  Date:2018  : 1970  [x]  Patient  Verified  Payor: BLUE MAMADOU / Plan: [facility]ah [facility] [id number] PPO / Product Type: PPO /    In time:4:08  Out time: 4:58  Total Treatment Time (min): 50  Visit #: 3 of 8    Treatment Area: Right carpal tunnel syndrome [G56.01]    SUBJECTIVE  Pain Level (0-10 scale): 0/10  Any medication changes, allergies to medications, adverse drug reactions, diagnosis change, or new procedure performed?: [x] No    [] Yes (see summary sheet for update)  Subjective functional status/changes:   [] No changes reported  \"My hand is still just stiff. \"    OBJECTIVE  Modality rationale: decrease edema, decrease pain and increase tissue extensibility to improve the patients ability to move right wrist thru pain-free ROM.    Min Type Additional Details    [] Estim:  []Unatt       []IFC  []Premod                        []Other:  []w/ice   []w/heat  Position:  Location:    [] Estim: []Att    []TENS instruct  []NMES                    []Other:  []w/US   []w/ice   []w/heat  Position:  Location:    []  Traction: [] Cervical       []Lumbar                       [] Prone          []Supine                       []Intermittent   []Continuous Lbs:  [] before manual  [] after manual    []  Ultrasound: []Continuous   [] Pulsed                           []1MHz   []3MHz Location:  W/cm2:    []  Iontophoresis with dexamethasone         Location: [] Take home patch   [] In clinic   10 []  Ice     [x]  heat  []  Ice massage  []  Laser   []  Anodyne Position:  Location: right hand/wrist    []  Laser with stim  []  Other: Position:  Location:    []  Vasopneumatic Device Pressure:       [] lo [] med [] hi   Temperature: [] lo [] med [] hi   [x] Skin assessment post-treatment:  [x]intact []redness- no adverse reaction    []redness  adverse reaction:   40 min Therapeutic Exercise:  [] See flow sheet :   Rationale: increase ROM, increase strength and improve coordination to improve the patients ability to increase use of right hand/wrist for functional tasks. With   [x] TE   [] TA   [] neuro   [] other: Patient Education: [x] Review HEP    [] Progressed/Changed HEP based on:   [] positioning   [] body mechanics   [] transfers   [] heat/ice application   [] Splint wear/care   [] Sensory re-education   [] scar management      [] other:             Other Objective/Functional Measures: Range of Motion: Elbow/Wrist   Wrist 3/28/2018   Right/Left 4/30/2018     Right            Flex 62/90   0-90            Ext 54/65   0-68            UD 30/30               RD 20/20               FA                   Pro 80/80               Sup 62/80  0-80                  Measurements: Taken with Juan Dynamometer, in Lbs  [place] 2 4/30/2018  Date Date Date Date Date Date   Right 20                     Left 55                     Deficit 35                     Change       [place]                 Pinch Measurements: Taken with Pinch Gauge, in Lbs   (hand) 4/30/2018  Date Date Date Date Date   Lateral                      Right 15                  Left  17                  Deficit 2                  Change                     Pad                     Right 10                  Left 12                  Deficit 2                  Change                     [place]                     Right 10                  Left 15                  Deficit 5    [place]         Change                         Edema: GIRTH CHART measured in cm  Date: 3/28/2018       Side R/L      DPC circum. 19.4/18. 2      Wrist Crease 15.2/14.9          Pain Level (0-10 scale) post treatment: 0/10    ASSESSMENT/Changes in Function: Pt provided and instructed in updated  and pinch strengthening HEP. Patient will continue to benefit from skilled OT services to modify and progress therapeutic interventions, address ROM deficits and address strength deficits to attain remaining goals.      []  See Plan of Care  [x]  See progress note/recertification  []  See Discharge Summary         Progress towards goals / Updated goals:  Goals: to be achieved in 4 weeks: 1. Patient will report no difficulty carrying a shopping bag with right hand on FOTO outcome measure in order to demonstrate improved functional performance. Status At Eval: unable to do   2. Patient will have 45 pounds of  in the right hand to allow for functional grasp for all ADL activities including dressing, bathing and self care. Status at note: 28  3. Patient will have improved right pipe pinch strength of 13 pounds in order to perform fine motor tasks such as writing.   Status at note: 10    PLAN  [x]  Upgrade activities as tolerated     [x]  Continue plan of care  []  Update interventions per flow sheet       []  Discharge due to:_  []  Other:_      Edgardo Marroquin OT 5/14/2018  4:15 PM    Future Appointments  Date Time Provider Yoni Arguello   5/22/2018 3:00 PM Skyla Oliva Tobin 67   5/30/2018 3:30 PM [facility] Jay Johnson[address]   6/12/2018 1:00 PM LUIS Pak Tobin 67

## 2018-05-18 DIAGNOSIS — Z01.818 PREOP EXAMINATION: Primary | ICD-10-CM

## 2018-05-21 ENCOUNTER — APPOINTMENT (OUTPATIENT)
Dept: PHYSICAL THERAPY | Age: 48
End: 2018-05-21
Payer: COMMERCIAL

## 2018-05-22 ENCOUNTER — OFFICE VISIT (OUTPATIENT)
Dept: ORTHOPEDIC SURGERY | Facility: CLINIC | Age: 48
End: 2018-05-22

## 2018-05-22 VITALS
HEART RATE: 108 BPM | BODY MASS INDEX: 34.6 KG/M2 | WEIGHT: 188 LBS | DIASTOLIC BLOOD PRESSURE: 83 MMHG | HEIGHT: 62 IN | SYSTOLIC BLOOD PRESSURE: 137 MMHG | TEMPERATURE: 97.5 F | OXYGEN SATURATION: 96 %

## 2018-05-22 DIAGNOSIS — Z98.890 S/P CARPAL TUNNEL RELEASE: Primary | ICD-10-CM

## 2018-05-22 NOTE — PROGRESS NOTES
Keily Wilde  1970     HISTORY OF PRESENT ILLNESS  Keily Wilde is a 50 y.o. female who presents today for evaluation s/p right wrist endoscopic carpal tunnel release on 3/26/18. She is doing well. Has been going to OT and feels she makes improvements. She will finish OT tomorrow and continue doing her exercises at home. Still has numbness in her fingers feels it is improving since last visit. Pain is a 1/10. Patient denies any fever, chills, chest pain, shortness of breath or calf pain. There are no new illness or injuries to report since last seen in the office. No changes in medications, allergies, social or family history. PHYSICAL EXAM:   Visit Vitals    /83    Pulse (!) 108    Temp 97.5 °F (36.4 °C)    Ht 5' 2\" (1.575 m)    Wt 188 lb (85.3 kg)    LMP  (Exact Date)    SpO2 96%    BMI 34.39 kg/m2      The patient is a well-developed, well-nourished female in no acute distress. The patient is alert and oriented times three. The patient appears to be well groomed. Mood and affect are normal.  ORTHOPEDIC EXAM of right wrist:  Inspection: No swelling, no bruising  Incision well healed  Range of motion: able to make a full fist  ttp none  Stability: Stable  Strength: n/a    IMPRESSION:      ICD-10-CM ICD-9-CM    1. S/P carpal tunnel release Z98.890 V45.89         PLAN:   1. Pt is doing well post operatively  2. Still having some numbness and tingling but it is improving since last visit  3. Discussed that it will take time for her symptoms to fully resolve. 4. Cont to work with OT and she will finish that tomorrow. She will continue activities as tolerated.      RTC PRN  [id number]

## 2018-05-23 ENCOUNTER — HOSPITAL ENCOUNTER (OUTPATIENT)
Dept: PHYSICAL THERAPY | Age: 48
Discharge: HOME | End: 2018-05-23
Payer: COMMERCIAL

## 2018-05-23 PROCEDURE — 97110 THERAPEUTIC EXERCISES: CPT

## 2018-05-23 NOTE — PROGRESS NOTES
OT DAILY TREATMENT NOTE - non [facility] 3-16    Patient Name: Robel Dupree  Date:2018  : 1970  [x]  Patient  Verified  Payor: BLUE CROSS / Plan: Micah Nicole [id number] PPO / Product Type: PPO /    In time: 4:00  Out time: 4:36  Total Treatment Time (min): 36  Visit #: 4 of 8    Treatment Area: Right carpal tunnel syndrome [G56.01]    SUBJECTIVE  Pain Level (0-10 scale): 0/10  Any medication changes, allergies to medications, adverse drug reactions, diagnosis change, or new procedure performed?: [x] No    [] Yes (see summary sheet for update)  Subjective functional status/changes:   [] No changes reported  \"I can pretty much do everything I could before and I saw the doctor the other day. \"    OBJECTIVE   36 min Therapeutic Exercise:  [x] See flow sheet :   Rationale: increase ROM, increase strength and improve coordination to improve the patients ability to return to PLOF.     With   [x] TE   [] TA   [] neuro   [] other: Patient Education: [x] Review HEP    [] Progressed/Changed HEP based on:   [] positioning   [] body mechanics   [] transfers   [] heat/ice application   [] Splint wear/care   [] Sensory re-education   [] scar management      [] other:             Other Objective/Functional Measures: Range of Motion: Elbow/Wrist   Wrist 3/28/2018   Right/Left 2018     Right            Flex 62/90   0-90            Ext 54/65   0-68            UD 30/30               RD 20/20               FA                   Pro 80/80               Sup 62/80  0-80                  Measurements: Taken with Juan Dynamometer, in Lbs  Madrano 2 2018  Date Date Date Date Date   Right 20 45                    Left 55   55                  Deficit 35   10    Martita Carmen [facility]      Change      +25    [facility]           Pinch Measurements: Taken with Pinch Gauge, in Lbs   (hand) 2018  Date Date Date Date   Lateral                      Right 15                  Left  17                  Deficit 2                  Change                     Pad                     Right 10                  Left 12                  Deficit 2                  Change                     Pipe                     Right 10 16                 Left 15 15                 Deficit 5   -1               Change      +6                   Edema: GIRTH CHART measured in cm  Date: 3/28/2018       Side R/L      DPC circum. 19.4/18. 2      Wrist Crease 15.2/14.9          Pain Level (0-10 scale) post treatment: 0/10    ASSESSMENT/Changes in Function: Patient has met stated goals and reports returning to PLOF. She was provided and instructed in HEP including wrist strengthening and provided graded resistance bands. Patient is satisfied with care and ready for d/c.    []  See Plan of Care  []  See progress note/recertification  [x]  See Discharge Summary         Progress towards goals / Updated goals:  Goals: to be achieved in 4 weeks: 1. Patient will report no difficulty carrying a shopping bag with right hand on FOTO outcome measure in order to demonstrate improved functional performance. Goal Met 5/23/18  Status At Eval: unable to do   Status at D/C: no difficulty   2. Patient will have 45 pounds of  in the right hand to allow for functional grasp for all ADL activities including dressing, bathing and self care. Goal Met 5/23/18  Status at note: 35  Status at D/C: 45  3. Patient will have improved right pipe pinch strength of 13 pounds in order to perform fine motor tasks such as writing.  Goal Met 5/23/18  Status at note: 10  Status at D/C: 16    PLAN  []  Upgrade activities as tolerated     []  Continue plan of care  []  Update interventions per flow sheet       [x]  Discharge due to:_all goals met, pt reports achieving PLOF  []  Other:_      Priscilla Cagle OT 5/23/2018  4:04 PM    Future Appointments  Date Time Provider Yoni Arguello   5/30/2018 3:30 PM LUIS Ma 67   6/12/2018 1:00 PM LUIS Ma [address]

## 2018-05-23 NOTE — PROGRESS NOTES
[facility]  [address]  [place], [address]  [phone] fax    Occupational Therapy Discharge Summary    Patient name: Claudia Adams Start of Care: 3/28/2018   Referral source: Abbi Ramírez,* : 1970   Medical/Treatment Diagnosis: Right carpal tunnel syndrome [G56.01] Onset Date:3/26/2018     Prior Hospitalization: see medical history Provider#: [id number]   Medications: Verified on Patient Summary List    Comorbidities: tobacco use  Prior Level of Function:Able to perform ADL and IADL tasks without functional limitations. Visits from Start of Care: 6    Missed Visits: 1  Reporting Period : 2018 to 2018    Summary of Care: 1. Patient will report no difficulty carrying a shopping bag with right hand on FOTO outcome measure in order to demonstrate improved functional performance. Goal Met 18  Status At Eval: unable to do   Status at D/C: no difficulty   2. Patient will have 45 pounds of  in the right hand to allow for functional grasp for all ADL activities including dressing, bathing and self care. Goal Met 18  Status at note: 35  Status at D/C: 45  3. Patient will have improved right pipe pinch strength of 13 pounds in order to perform fine motor tasks such as writing. Goal Met 18  Status at note: 10  Status at D/C: 16    ASSESSMENT/RECOMMENDATIONS: Patient has met stated goals and reports returning to PLOF. Patient provided and instructed in HEP including wrist and  and pinch strengthening.   Patient is satisfied with care and ready for d/c.   [x]Discontinue therapy: [x]Patient has reached or is progressing toward set goals      []Patient is non-compliant or has abdicated      []Due to lack of appreciable progress towards set goals    Bel Lin OT 2018 4:15 PM

## 2018-05-25 ENCOUNTER — HOSPITAL ENCOUNTER (OUTPATIENT)
Dept: GENERAL RADIOLOGY | Age: 48
Discharge: HOME | End: 2018-05-25
Payer: COMMERCIAL

## 2018-05-25 ENCOUNTER — HOSPITAL ENCOUNTER (OUTPATIENT)
Dept: LAB | Age: 48
Discharge: HOME | End: 2018-05-25
Payer: COMMERCIAL

## 2018-05-25 DIAGNOSIS — Z01.818 PREOP EXAMINATION: ICD-10-CM

## 2018-05-25 LAB
ALBUMIN SERPL-MCNC: 4 G/DL (ref 3.4–5)
ALBUMIN/GLOB SERPL: 1.3 {RATIO} (ref 0.8–1.7)
ALP SERPL-CCNC: 89 U/L (ref 45–117)
ALT SERPL-CCNC: 26 U/L (ref 13–56)
ANION GAP SERPL CALC-SCNC: 8 MMOL/L (ref 3–18)
AST SERPL-CCNC: 15 U/L (ref 15–37)
BASOPHILS # BLD: 0.1 K/UL (ref 0–0.06)
BASOPHILS NFR BLD: 1 % (ref 0–2)
BILIRUB SERPL-MCNC: 0.2 MG/DL (ref 0.2–1)
BUN SERPL-MCNC: 16 MG/DL (ref 7–18)
BUN/CREAT SERPL: 16 (ref 12–20)
CALCIUM SERPL-MCNC: 9.2 MG/DL (ref 8.5–10.1)
CHLORIDE SERPL-SCNC: 105 MMOL/L (ref 100–108)
CO2 SERPL-SCNC: 29 MMOL/L (ref 21–32)
CREAT SERPL-MCNC: 0.97 MG/DL (ref 0.6–1.3)
DIFFERENTIAL METHOD BLD: ABNORMAL
EOSINOPHIL # BLD: 0.2 K/UL (ref 0–0.4)
EOSINOPHIL NFR BLD: 2 % (ref 0–5)
ERYTHROCYTE [DISTWIDTH] IN BLOOD BY AUTOMATED COUNT: 12.9 % (ref 11.6–14.5)
GLOBULIN SER CALC-MCNC: 3 G/DL (ref 2–4)
GLUCOSE SERPL-MCNC: 96 MG/DL (ref 74–99)
HCT VFR BLD AUTO: 40.8 % (ref 35–45)
HGB BLD-MCNC: 13.8 G/DL (ref 12–16)
INR PPP: 1 (ref 0.8–1.2)
LYMPHOCYTES # BLD: 3.8 K/UL (ref 0.9–3.6)
LYMPHOCYTES NFR BLD: 36 % (ref 21–52)
MCH RBC QN AUTO: 29.9 PG (ref 24–34)
MCHC RBC AUTO-ENTMCNC: 33.8 G/DL (ref 31–37)
MCV RBC AUTO: 88.3 FL (ref 74–97)
MONOCYTES # BLD: 0.5 K/UL (ref 0.05–1.2)
MONOCYTES NFR BLD: 5 % (ref 3–10)
NEUTS SEG # BLD: 5.9 K/UL (ref 1.8–8)
NEUTS SEG NFR BLD: 56 % (ref 40–73)
PLATELET # BLD AUTO: 296 K/UL (ref 135–420)
PMV BLD AUTO: 11 FL (ref 9.2–11.8)
POTASSIUM SERPL-SCNC: 3.6 MMOL/L (ref 3.5–5.5)
PROT SERPL-MCNC: 7 G/DL (ref 6.4–8.2)
PROTHROMBIN TIME: 12.2 SEC (ref 11.5–15.2)
RBC # BLD AUTO: 4.62 M/UL (ref 4.2–5.3)
SODIUM SERPL-SCNC: 142 MMOL/L (ref 136–145)
WBC # BLD AUTO: 10.6 K/UL (ref 4.6–13.2)

## 2018-05-25 PROCEDURE — 93005 ELECTROCARDIOGRAM TRACING: CPT

## 2018-05-25 PROCEDURE — 36415 COLL VENOUS BLD VENIPUNCTURE: CPT | Performed by: ORTHOPAEDIC SURGERY

## 2018-05-25 PROCEDURE — 85610 PROTHROMBIN TIME: CPT | Performed by: ORTHOPAEDIC SURGERY

## 2018-05-25 PROCEDURE — 85025 COMPLETE CBC W/AUTO DIFF WBC: CPT | Performed by: ORTHOPAEDIC SURGERY

## 2018-05-25 PROCEDURE — 71046 X-RAY EXAM CHEST 2 VIEWS: CPT

## 2018-05-25 PROCEDURE — 80053 COMPREHEN METABOLIC PANEL: CPT | Performed by: ORTHOPAEDIC SURGERY

## 2018-05-26 LAB
ATRIAL RATE: 95 BPM
CALCULATED P AXIS, ECG09: 54 DEGREES
CALCULATED R AXIS, ECG10: 25 DEGREES
CALCULATED T AXIS, ECG11: 49 DEGREES
DIAGNOSIS, 93000: NORMAL
P-R INTERVAL, ECG05: 158 MS
Q-T INTERVAL, ECG07: 392 MS
QRS DURATION, ECG06: 76 MS
QTC CALCULATION (BEZET), ECG08: 492 MS
VENTRICULAR RATE, ECG03: 95 BPM

## 2018-05-30 ENCOUNTER — OFFICE VISIT (OUTPATIENT)
Dept: ORTHOPEDIC SURGERY | Age: 48
End: 2018-05-30

## 2018-05-30 VITALS
SYSTOLIC BLOOD PRESSURE: 122 MMHG | RESPIRATION RATE: 16 BRPM | DIASTOLIC BLOOD PRESSURE: 77 MMHG | HEIGHT: 62 IN | TEMPERATURE: 97.8 F | OXYGEN SATURATION: 97 % | HEART RATE: 102 BPM | WEIGHT: 187 LBS | BODY MASS INDEX: 34.41 KG/M2

## 2018-05-30 DIAGNOSIS — Z01.818 PRE-OPERATIVE EXAMINATION: ICD-10-CM

## 2018-05-30 DIAGNOSIS — M20.41 HAMMERTOE OF SECOND TOE OF RIGHT FOOT: Primary | ICD-10-CM

## 2018-05-30 DIAGNOSIS — M21.611 BUNION OF RIGHT FOOT: ICD-10-CM

## 2018-05-30 RX ORDER — PROMETHAZINE HYDROCHLORIDE 25 MG/1
25 TABLET ORAL
Qty: 30 TAB | Refills: 0 | Status: SHIPPED | OUTPATIENT
Start: 2018-05-30

## 2018-05-30 RX ORDER — HYDROCODONE BITARTRATE AND ACETAMINOPHEN 7.5; 325 MG/1; MG/1
TABLET ORAL
Qty: 50 TAB | Refills: 0 | Status: SHIPPED | OUTPATIENT
Start: 2018-05-30 | End: 2018-06-26 | Stop reason: DRUGHIGH

## 2018-05-30 RX ORDER — POLYETHYLENE GLYCOL 3350 17 G/17G
17 POWDER, FOR SOLUTION ORAL DAILY
Qty: 10 PACKET | Refills: 1 | Status: SHIPPED | OUTPATIENT
Start: 2018-05-30

## 2018-05-30 NOTE — PROGRESS NOTES
FOOT AND ANKLE HISTORY AND PHYSICAL      Patient: Keily Wilde                   MRN: [id number]         SSN: [id number]  YOB: 1970                     AGE: 50 y.o. SEX: female    Patient scheduled for:  Right Lapidus procedure, first tarsometatarsal joint arthrodesis, modified Ma distal soft tissue procedure, Mazin procedure number two proximal interphalangeal joint resection arthroplasty and implant to the right number two toe, extensor digitorum longus lengthening of the number two toe, extensor digitorum brevis tenotomy possible horizontal capsulotomy of the right number two toe  Date of surgery: 6/14/18   Special Equipment: Arthrex DART Hammertoe Implant  Location of Surgery: [address] [place] [address] at \[facility]\""  Surgeon: Sherly Freeman. MD Umair  ANESTHESIA TYPE:  General, Popliteal block          PRESCRIPTIONS AND/OR ORDERS PROVIDED DURING H&P:    Orders Placed This Encounter    Generic Supply Order    AMB SUPPLY ORDER    HYDROcodone-acetaminophen (NORCO) 7.5-325 mg per tablet    promethazine (PHENERGAN) 25 mg tablet    polyethylene glycol (MIRALAX) 17 gram packet              HISTORY:     The patient was seen in the office today for a preoperative history and physical for an upcoming above listed surgery. The patient is a pleasant 50 y.o. female who has a history of right of foot pain. Patient reports that she had bilateral bunions for several years, but her chief complaint is due to a right second toe hammering that causes pain and discomfort. Dr. Bonita Diop had a lengthy discussion with the patient about postoperative recovery. She understands that she will be nonweightbearing for at least the first one to two weeks, with some partial weightbearing on the heel just for balance. It takes about three weeks for the incisions to heal at a minimum. Hopefully, the sutures will be out at three weeks.   She will be in a CAM walker boot for upwards of a minimum of two months, and it will be at least two months before she can fit into a tennis shoe. She wants to have the surgery done sometime in June 2018 right after school ends. She is employed as a . Due to the current findings, affected activity of daily living and continued pain and discomfort, surgical intervention is indicated. The alternatives, risks, and complications, including but not limited to infection, blood loss, need for blood transfusion, neurovascular damage, cheyenne-incisional numbness, subcutaneous hematoma, bone fracture, anesthetic complications, DVT, PE, death, RSD, postoperative stiffness and pain, possible surgical scar, delayed healing and nonhealing, reflexive sympathetic dystrophy, damage to blood vessels and nerves, need for more surgery, MI, and stroke have been discussed. The patient understands and wishes to proceed with surgery. PAST MEDICAL HISTORY:     Past Medical History:   Diagnosis Date    Cough     GERD (gastroesophageal reflux disease)     Nausea after anesthesia     Psychiatric disorder     depression    Sinus drainage        CURRENT MEDICATIONS:     Current Outpatient Prescriptions   Medication Sig Dispense Refill    HYDROcodone-acetaminophen (NORCO) 7.5-325 mg per tablet TAKE ONE TO TWO TABLETS BY MOUTH Q 4 - 6 HRS PRN PAIN **AFTER SURGERY**DO NOT TAKE BEFORE SURGERY  Indications: Pain 50 Tab 0    promethazine (PHENERGAN) 25 mg tablet Take 1 Tab by mouth every six (6) hours as needed for Nausea. 30 Tab 0    polyethylene glycol (MIRALAX) 17 gram packet Take 1 Packet by mouth daily. 10 Packet 1    cyclobenzaprine (FLEXERIL) 10 mg tablet TAKE 1 TAB BY MOUTH NIGHTLY AS NEEDED FOR MUSCLE SPASM(S). 30 Tab 1    fexofenadine-pseudoephedrine (ALLEGRA-D 12 HOUR)  mg Tb12 Take 1 Tab by mouth every twelve (12) hours.  melatonin tab tablet Take 10 mg by mouth nightly.       traMADol (ULTRAM) 50 mg tablet Take 1 Tab by mouth every six (6) hours as needed for Pain. Max Daily Amount: 200 mg. Do not take until after surgery 60 Tab 0       ALLERGIES:     No Known Allergies      SURGICAL HISTORY:     Past Surgical History:   Procedure Laterality Date    HX CARPAL TUNNEL RELEASE Right     HX  SECTION      HX GYN      hysterectomy    HX GYN      c/s    SPINAL FUSION,ANT,EA ADNL LEVEL      c-spine       SOCIAL HISTORY:     Social History     Social History    Marital status:      Spouse name: N/A    Number of children: N/A    Years of education: N/A     Social History Main Topics    Smoking status: Current Every Day Smoker     Packs/day: 0.50    Smokeless tobacco: Never Used    Alcohol use Yes      Comment: socially    Drug use: None    Sexual activity: Not Asked     Other Topics Concern    None     Social History Narrative       FAMILY HISTORY:     Family History   Problem Relation Age of Onset    Adopted: Yes    No Known Problems Mother     No Known Problems Father     No Known Problems Sister     No Known Problems Brother        REVIEW OF SYSTEMS:     Negative for fevers, chills, chest pain, shortness of breath, weight loss, recent illness other than sinus drainage    General: Negative for fever and chills. No unexpected change in weight. Denies fatigue. No change in appetite. Skin: Negative for rash or itching. HEENT: Negative for congestion, sore throat, neck pain and neck stiffness. No change in vision or hearing. Hasn't noted any enlarged lymph nodes in the neck. Cardiovascular:  Negative for chest pain and palpitations. Has not noted pedal edema. Respiratory: Negative for cough, colds, sinus, hemoptysis, shortness of breath and wheezing. Gastrointestinal: Negative for nausea and vomiting, rectal bleeding, coffee ground emesis, abdominal pain, diarrhea and constipation. Genitourinary: Negative for dysuria, frequency urgency, or burning on micturition.  No flank pain, no foul smelling urine, no difficulty with initiating urination. Hematological: Negative for bleeding or easy bruising. Musculoskeletal: Negative  for arthralgias, back pain or neck pain. Neurological: Negative for dizziness, seizures or syncopal episodes. Denies headaches. Endocrine: Denies excessive thirst.  No heat/cold intolerance. Psychiatric: Negative for depression or insomnia. PHYSICAL EXAMINATION:     VITALS:   Visit Vitals    /77    Pulse (!) 102    Temp 97.8 °F (36.6 °C) (Oral)    Resp 16    Ht 5' 2\" (1.575 m)    Wt 187 lb (84.8 kg)    SpO2 97%    BMI 34.2 kg/m2       GEN:  Well developed, well nourished 50 y.o. female in no acute distress. PSYCH: Alert an oriented to person, place and time. Mood, memory, affect, behavior and judgment normal  HEENT: Normocephalic and atraumatic. Eyes: Conjunctivae and EOM are normal.Pupils are equal, round, and reactive to light. External ear normal appearance, external nose normal appearing. Mouth/Throat: Oropharynx is clear and moist, able to handle oral secretions w/out difficulty, airway patent  NECK: Supple. Normal ROM, No lymphadenopathy. Trachea is midline. No bruising, swelling or deformity  RESP: Clear to auscultation bilaterally. No wheezes, rales, rhonchi. Normal effort and breath sounds. No respiratory distress  CARDIO:Tachycardia noted, normal heart sounds. No MGR. ABDOMEN: Soft, non-tender, non-distended, normoactive bowel sounds in all four quadrants. There is no tenderness. There is no rebound and no guarding. BACK: No CVA or spinal tenderness  BREAST:  Deferred  PELVIC:    Deferred   RECTAL:  Deferred   :           Deferred  EXTREMITIES: EXAMINATION OF:  LOCATION:  Right FOOT/ANKLE  Integumentary: No rashes, skin patches, wounds, or abrasions to the right or left legs   Warm and normal color. No regions of expressible drainage.    Callus to dorsal Hammer toe is not present, Calluses to plantar foot is not present    Fullness (fixed superficial//non mobile) along the plantar medial arch, not associated with FHL tendon. Gait:  Slight Limp with gait  Alignment: moderate Bunion Alignment is present with pronation, adduction and #1 and #2 toe abutment. severe Hammertoe(s) present  (Rigid) 2   Tenderness: mild Medial Eminence of Bunion    None to Hammer Toes   NONE to Medial Malleolar, 4/5 Met base midfoot, achilles, tib post, or   NONE to syndesmosis. Motor/Strength/Tone Exam: Normal Dorsiflexion/Plantar Flexion of a great toe MTP    Sensory Exam:   Intact Normal Sensation to ankle/foot, but slight decreased             sensation to medial cutaneous nerve along great toe MTP medial             eminence region. Stability Testing:  YES SPRING back mobility test for 1st metatarsal               Yes spring back at: RIGHT 1st TMT instability                No anterolateral or varus instability of the Ankle or Subtalar Joints               No peroneal tendon instability noted  ROM: Normal ROM noted to ankle  Contractures: No Achilles or Gastrocnemius Contractures  Calf tenderness: Absent for calf or gastrocnemius muscle regions       Soft, supple, non tender, non taut lower extremity compartments  Alignment: Neutral Hindfoot  Wounds/Abrasions:   None present  Extremities:   No embolic phenomena to the toes or hands         No significant edema to the foot and or toes. Lower extremities are warm and appear well perfused    DVT: No evidence of DVT seen on examination at this time     No calf swelling, no tenderness to calf muscles  Lymphatic:  No Evidence of Lymphedema  Vascular: Medial Border of Tibia Region: Edema is not present        Pulses: Dorsalis Pedis &  Posterior Tibial Pulses : Palpable yes        Varicosities Lower Limbs :  None    Neuro: Negative bilateral Straight leg raise (seated position)    See Musculoskeletal section for pertinent individual extremity examination    No abnormal hand/wrist, foot/ankle, or facial/neck tremors.     RADIOGRAPHS & DIAGNOSTIC STUDIES:     FOOT X RAYS 3 VIEWS Right   1/31/2018    WEIGHT BEARING    X RAYS AT [facility] [address] [place] [address]  1/31/2018      Bones: No fractures or dislocations. No focal osteolytic or osteoblastic process    Bone Spurs: Inferior Calcaneal Bone Spur   Alignment: Bunion Alignment: increased IM and HVA    moderate Hallux Valgus Alignment, moderate increased IM ANGLE (1st/2nd)   Metatarsus Adductus is absent   Midfoot Alignment is WNL. Joint: Slightly Incongruent  Soft Tissues: Soft Tissues Normal     No ankle joint effusion in lateral projection. Mineralization: Suggests Normal Bone        LABS:     @  CBC:   Lab Results   Component Value Date/Time    WBC 10.6 05/25/2018 04:17 PM    RBC 4.62 05/25/2018 04:17 PM    HGB 13.8 05/25/2018 04:17 PM    HCT 40.8 05/25/2018 04:17 PM    PLATELET 206 18/54/9125 04:17 PM   , CMP:   Lab Results   Component Value Date/Time    Glucose 96 05/25/2018 04:17 PM    Sodium 142 05/25/2018 04:17 PM    Potassium 3.6 05/25/2018 04:17 PM    Chloride 105 05/25/2018 04:17 PM    CO2 29 05/25/2018 04:17 PM    BUN 16 05/25/2018 04:17 PM    Creatinine 0.97 05/25/2018 04:17 PM    Calcium 9.2 05/25/2018 04:17 PM    Anion gap 8 05/25/2018 04:17 PM    BUN/Creatinine ratio 16 05/25/2018 04:17 PM    Alk. phosphatase 89 05/25/2018 04:17 PM    Protein, total 7.0 05/25/2018 04:17 PM    Albumin 4.0 05/25/2018 04:17 PM    Globulin 3.0 05/25/2018 04:17 PM    A-G Ratio 1.3 05/25/2018 04:17 PM    and Coagulation:   Lab Results   Component Value Date/Time    Prothrombin time 12.2 05/25/2018 04:17 PM    INR 1.0 05/25/2018 04:17 PM   @    Preoperative labs were reviewed and are substantially within normal limits  Chest X-ray revealed no acute cardiopulmonary process. Scoliosis noted.   EKG:     Normal sinus rhythm   Prolonged QT   Abnormal ECG   When compared with ECG of 17-FEB-2017 11:50,   No significant change was found   Confirmed by Krunal Larios MD, [place] ([id number]) on 5/26/2018 11:46:20 AM     ASSESSMENT:       Encounter Diagnoses   Name Primary?  Hammertoe of second toe of right foot Yes    Bunion of right foot     Pre-operative examination        PLAN:     Again, the alternatives, risks, and complications, as well as expected outcome were discussed. The patient understands and agrees to proceed with the above listed surgery pending EKG review by anesthesia. Patient has been given Hibiclens wash with instructions and prescriptions and or orders listed above.     Louise Aleman PA-C  5/30/2018  4:12 PM

## 2018-05-30 NOTE — PATIENT INSTRUCTIONS
Dr. Jacky Nelson Pre-operative Instructions:      Patient: Jorgito Gregg   :  1970     I understand I am to stop taking oral birth control pills, hormonal replacement therapy and all Aspirin, Aspirin containing medications, Non-Steroidal Anti-Inflammatory medications (such as Advil, Aleve, Motrin, Ibuprofen) and or Blood thinner medication such as Coumadin, Plavix, Heparin or others 5 days prior to surgery. I understand I am to STOP taking these Medications 5 days prior to surgery:  I am to get instructions from my prescribing physician. 1. __As listed above_______________________  2. _____________________________________  3. _____________________________________  4. _____________________________________    I understand that if I am taking daily medications for high blood pressure, I can take them the morning of surgery with a small sip of water. I will consult my prescribing physician or call [facility] with specific questions. I also understand that:     I am to report important observations or changes that may occur prior to surgery. If I have any changes in my physical condition, such as a rash, a fever, sore throat, abscess, ulcers, nausea, vomiting, or diarrhea. I am to call the office and I am to consult my primary care physician to assess and treat the problem.  I am not to eat or drink anything after midnight the night before my surgery.  I am not to drink alcoholic beverages 24 hours prior to surgery.  I am not to do any illegal drugs prior to surgery.  I am not to smoke at least 24 hours prior to surgery.  I am able and will shower or bathe before surgery. I will use the Hibiclens solution on my surgical site only. The hibiclens directions are one packet a day starting two days before surgery.  I will remove any nail polish, make-up or jewelry prior to arriving for my surgery.       If I wear glasses, contact lenses or dentures they must be removed prior to going to the operating room.  All body piercing and artifical eye-lashes must be removed prior to surgery     I will not wear any aerosol sprays, perfumes or skin creams.  I am to make arrangements for a family member or friend to accompany me to surgery and take me home after my surgery as I will not be allowed to leave the hospital alone. A cab or bus will not be acceptable. Please make arrange for someone to stay with you for 24 hours after surgery.  Patient has expressed understanding of the diagnosis, treatment and planned surgery        Surgery: What to Expect at [address] [place] [address]  This care sheet gives you a general idea about how long it will take for you to recover from your surgery. But each person recovers at a different pace. How can you care for yourself at home? Activity  · Allow your body to heal. Don't move quickly or lift anything heavy until you are feeling better. · Rest when you feel tired. · Your doctor may give you specific instructions on when you can do your normal activities again, such as driving and going back to work. · Be active. Walking is a good choice. Diet  · You can eat your normal diet when you feel well. If your stomach is upset, try bland, low-fat foods like plain rice, broiled chicken, toast, and yogurt. · If your bowel movements are not regular right after surgery, try to avoid constipation and straining. Drink plenty of water. Your doctor may suggest fiber, a stool softener, or a mild laxative. Medicines  · Your doctor will tell you if and when you can restart your medicines. He or she will also give you instructions about taking any new medicines. · If you take blood thinners, such as warfarin (Coumadin), clopidogrel (Plavix), or aspirin, be sure to talk to your doctor. He or she will tell you if and when to start taking those medicines again. Make sure that you understand exactly what your doctor wants you to do. · Be safe with medicines. Read and follow all instructions on the label. ¨ If the doctor gave you a prescription medicine for pain, take it as prescribed. ¨ If you are not taking a prescription pain medicine, ask your doctor if you can take an over-the-counter medicine. Incision care  · You will have a dressing over the cut (incision). A dressing helps the incision heal and protects it. Your doctor will tell you how to take care of this. · If you have strips of tape on the cut the doctor made, leave the tape on for a week or until it falls off. · If you had stitches, your doctor will tell you when to come back to have them removed. · If you have skin adhesive on the cut, leave it on until it falls off. Skin adhesive is also called liquid stitches. · Change the bandage every day. · Wash the area daily with warm, soapy water, and pat it dry. Don't use hydrogen peroxide or alcohol. They can slow healing. · You may cover the area with a gauze bandage if it oozes fluid or rubs against clothing. · You may shower 24 to 48 hours after surgery. Pat the incision dry. Don't swim or take a bath for the first 2 weeks, or until your doctor tells you it is okay. Follow-up care is a key part of your treatment and safety. Be sure to make and go to all appointments, and call your doctor if you are having problems. It's also a good idea to know your test results and keep a list of the medicines you take. When should you call for help? Call 911 anytime you think you may need emergency care. For example, call if:  · You passed out (lost consciousness). · You have severe trouble breathing. · You have sudden chest pain and shortness of breath, or you cough up blood. Call your doctor now or seek immediate medical care if:  · You have pain that does not get better after you take pain medicine. · You have loose stitches, or your incision comes open. · You are bleeding through your dressing.  A small amount of blood is normal.  · You have signs of infection, such as:  ¨ Increased pain, swelling, warmth, or redness. ¨ Red streaks leading from the incision. ¨ Pus draining from the incision. ¨ A fever. · You have symptoms of a blood clot in your arm or leg (called a deep vein thrombosis). These may include:  ¨ Pain in your calf, back of the knee, thigh, or groin. ¨ Redness and swelling in the arm, leg, or groin. Watch closely for any changes in your health, and be sure to contact your doctor if:  · You do not have a bowel movement after taking a laxative. Where can you learn more? Go to http://jose martin-karrie.info/  Enter K766 in the search box to learn more about \"Surgery: What to Expect at Home. \"  © 1111-4481 [employer]. Care instructions adapted under license by [employer] (which disclaims liability or warranty for this information). This care instruction is for use with your licensed healthcare professional. If you have questions about a medical condition or this instruction, always ask your healthcare professional. [address] any warranty or liability for your use of this information. Content Version: 97.4.645151; Current as of: November 20, 2015          Acute Pain After Surgery: Care Instructions  Your Care Instructions      It's common to have some pain after surgery. Pain doesn't mean that something is wrong or that the surgery didn't go well. But when the pain is severe, it's important to work with your doctor to manage it. It's also important to be aware of a few facts about pain and pain medicine. · You are the only person who knows what your pain feels like. So be sure to tell your doctor when you are in pain or when the pain changes. Then he or she will know how to adjust your medicines. · Pain is often easier to control right after it starts. So it may be better to take regular doses of pain medicine and not wait until the pain gets bad. · Medicine can help control pain. But this doesn't mean you'll have no pain. Medicine works to keep the pain at a level you can live with. With time, you will feel better. Follow-up care is a key part of your treatment and safety. Be sure to make and go to all appointments, and call your doctor if you are having problems. It's also a good idea to know your test results and keep a list of the medicines you take. How can you care for yourself at home? · Be safe with medicines. Read and follow all instructions on the label. ¨ If the doctor gave you a prescription medicine for pain, take it as prescribed. ¨ If you are not taking a prescription pain medicine, ask your doctor if you can take an over-the-counter medicine. · If you take an over-the-counter pain medicine, such as acetaminophen (Tylenol), ibuprofen (Advil, Motrin), or naproxen (Aleve), read and follow all instructions on the label. · Do not take two or more pain medicines at the same time unless the doctor told you to. · Do not drink alcohol while you are taking pain medicines. · Try to walk each day if your doctor recommends it. Start by walking a little more than you did the day before. Bit by bit, increase the amount you walk. Walking increases blood flow. It also helps prevent pneumonia and constipation. · To prevent constipation from opioid pain medicines:  ¨ Talk to your doctor about a laxative. ¨ Include fruits, vegetables, beans, and whole grains in your diet each day. These foods are high in fiber. ¨ Drink plenty of fluids, enough so that your urine is light yellow or clear like water. Drink water, fruit juice, or other drinks that do not contain caffeine or alcohol. If you have kidney, heart, or liver disease and have to limit fluids, talk with your doctor before you increase the amount of fluids you drink. ¨ Take a fiber supplement, such as Citrucel or Metamucil, every day if needed. Read and follow all instructions on the label.  If you take pain medicine for more than a few days, talk to your doctor before you take fiber. When should you call for help? Call your doctor now or seek immediate medical care if:  ? · Your pain gets worse. ? · Your pain is not controlled by medicine. ? Watch closely for changes in your health, and be sure to contact your doctor if you have any problems. Where can you learn more? Go to http://jose martin-karrie.info/. Enter (98) 373-790 in the search box to learn more about \"Acute Pain After Surgery: Care Instructions. \"  Current as of: March 20, 2017  Content Version: 11.4  © 9374-5161 [employer]. Care instructions adapted under license by [employer] (which disclaims liability or warranty for this information). If you have questions about a medical condition or this instruction, always ask your healthcare professional. [address] any warranty or liability for your use of this information.

## 2018-05-30 NOTE — MR AVS SNAPSHOT
[address], [address] 200 [facility] 
[phone] Patient: Samir Boyle MRN: [id number] :1970 Visit Information Date & Time Provider Department Dept. Phone Encounter #  
 2018  3:30 PM Viktor Johnson [address] [facility] [phone] [id number] Follow-up Instructions Return in about 3 weeks (around 2018) for post surgical evaluation. Your Appointments 2018  1:00 PM  
POST OP with Viktor Johnson PA-C  
[address] [facility], [address] and 900 [facility] ([facility]) Appt Note: Rt Lapidus Procedure; Right Lapidus procedure [address], [address] 200 [facility]  
[phone] 2300 [facility], [facility] [address] Upcoming Health Maintenance Date Due Pneumococcal 19-64 Medium Risk (1 of 1 - PPSV23) 1987 DTaP/Tdap/Td series (1 - Tdap) 1989 PAP AKA CERVICAL CYTOLOGY 1989 Influenza Age 5 to Adult 2018 Allergies as of 2018  Review Complete On: 2018 By: Tristian Javier No Known Allergies Current Immunizations  Never Reviewed No immunizations on file. Not reviewed this visit You Were Diagnosed With   
  
 Codes Comments Hammertoe of second toe of right foot    -  Primary ICD-10-CM: M20.41 ICD-9-CM: 735.4 Bunion of right foot     ICD-10-CM: M21.611 ICD-9-CM: 727.1 Pre-operative examination     ICD-10-CM: S05.137 ICD-9-CM: V72.84 Vitals BP Pulse Temp Resp Height(growth percentile) Weight(growth percentile) 122/77 (!) 102 97.8 °F (36.6 °C) (Oral) 16 5' 2\" (1.575 m) 187 lb (84.8 kg) SpO2 BMI OB Status Smoking Status 97% 34.2 kg/m2 Hysterectomy Current Every Day Smoker BMI and BSA Data Body Mass Index Body Surface Area  
 34.2 kg/m 2 1.93 m 2 Preferred Pharmacy Pharmacy Name Phone [id number] [address] [facility] [phone] Your Updated Medication List  
  
   
This list is accurate as of 18  4:28 PM.  Always use your most recent med list. ALLEGRA-D 12 HOUR  mg Tb12 Generic drug:  fexofenadine-pseudoephedrine Take 1 Tab by mouth every twelve (12) hours. cyclobenzaprine 10 mg tablet Commonly known as:  FLEXERIL  
TAKE 1 TAB BY MOUTH NIGHTLY AS NEEDED FOR MUSCLE SPASM(S).  
  
 melatonin Tab tablet Take 10 mg by mouth nightly. traMADol 50 mg tablet Commonly known as:  ULTRAM  
Take 1 Tab by mouth every six (6) hours as needed for Pain. Max Daily Amount: 200 mg. Do not take until after surgery Follow-up Instructions Return in about 3 weeks (around 2018) for post surgical evaluation. Patient Instructions Dr. Tom Walsh Pre-operative Instructions: 
 
 
Patient: Vaughn Waldrop :  1970 I understand I am to stop taking oral birth control pills, hormonal replacement therapy and all Aspirin, Aspirin containing medications, Non-Steroidal Anti-Inflammatory medications (such as Advil, Aleve, Motrin, Ibuprofen) and or Blood thinner medication such as Coumadin, Plavix, Heparin or others 5 days prior to surgery. I understand I am to STOP taking these Medications 5 days prior to surgery: 
I am to get instructions from my prescribing physician. 1. __As listed above_______________________ 2. _____________________________________ 3. _____________________________________ 4. _____________________________________ I understand that if I am taking daily medications for high blood pressure, I can take them the morning of surgery with a small sip of water. I will consult my prescribing physician or call [facility] with specific questions. I also understand that: ? I am to report important observations or changes that may occur prior to surgery. If I have any changes in my physical condition, such as a rash, a fever, sore throat, abscess, ulcers, nausea, vomiting, or diarrhea. I am to call the office and I am to consult my primary care physician to assess and treat the problem. ? I am not to eat or drink anything after midnight the night before my surgery. ? I am not to drink alcoholic beverages 24 hours prior to surgery. ? I am not to do any illegal drugs prior to surgery. ? I am not to smoke at least 24 hours prior to surgery. ? I am able and will shower or bathe before surgery. I will use the Hibiclens solution on my surgical site only. The hibiclens directions are one packet a day starting two days before surgery. ? I will remove any nail polish, make-up or jewelry prior to arriving for my surgery. ? If I wear glasses, contact lenses or dentures they must be removed prior to going to the operating room. ? All body piercing and artifical eye-lashes must be removed prior to surgery ? I will not wear any aerosol sprays, perfumes or skin creams. ? I am to make arrangements for a family member or friend to accompany me to surgery and take me home after my surgery as I will not be allowed to leave the hospital alone. A cab or bus will not be acceptable. Please make arrange for someone to stay with you for 24 hours after surgery. ? Patient has expressed understanding of the diagnosis, treatment and planned surgery Surgery: What to Expect at [facility] Your Recovery This care sheet gives you a general idea about how long it will take for you to recover from your surgery. But each person recovers at a different pace. How can you care for yourself at home? Activity · Allow your body to heal. Don't move quickly or lift anything heavy until you are feeling better. · Rest when you feel tired.  
· Your doctor may give you specific instructions on when you can do your normal activities again, such as driving and going back to work. · Be active. Walking is a good choice. Diet · You can eat your normal diet when you feel well. If your stomach is upset, try bland, low-fat foods like plain rice, broiled chicken, toast, and yogurt. · If your bowel movements are not regular right after surgery, try to avoid constipation and straining. Drink plenty of water. Your doctor may suggest fiber, a stool softener, or a mild laxative. Medicines · Your doctor will tell you if and when you can restart your medicines. He or she will also give you instructions about taking any new medicines. · If you take blood thinners, such as warfarin (Coumadin), clopidogrel (Plavix), or aspirin, be sure to talk to your doctor. He or she will tell you if and when to start taking those medicines again. Make sure that you understand exactly what your doctor wants you to do. · Be safe with medicines. Read and follow all instructions on the label. ¨ If the doctor gave you a prescription medicine for pain, take it as prescribed. ¨ If you are not taking a prescription pain medicine, ask your doctor if you can take an over-the-counter medicine. Incision care · You will have a dressing over the cut (incision). A dressing helps the incision heal and protects it. Your doctor will tell you how to take care of this. · If you have strips of tape on the cut the doctor made, leave the tape on for a week or until it falls off. · If you had stitches, your doctor will tell you when to come back to have them removed. · If you have skin adhesive on the cut, leave it on until it falls off. Skin adhesive is also called liquid stitches. · Change the bandage every day. · Wash the area daily with warm, soapy water, and pat it dry. Don't use hydrogen peroxide or alcohol. They can slow healing. · You may cover the area with a gauze bandage if it oozes fluid or rubs against clothing. · You may shower 24 to 48 hours after surgery. Pat the incision dry. Don't swim or take a bath for the first 2 weeks, or until your doctor tells you it is okay. Follow-up care is a key part of your treatment and safety. Be sure to make and go to all appointments, and call your doctor if you are having problems. It's also a good idea to know your test results and keep a list of the medicines you take. When should you call for help? Call 911 anytime you think you may need emergency care. For example, call if: 
· You passed out (lost consciousness). · You have severe trouble breathing. · You have sudden chest pain and shortness of breath, or you cough up blood. Call your doctor now or seek immediate medical care if: 
· You have pain that does not get better after you take pain medicine. · You have loose stitches, or your incision comes open. · You are bleeding through your dressing. A small amount of blood is normal. 
· You have signs of infection, such as: 
¨ Increased pain, swelling, warmth, or redness. ¨ Red streaks leading from the incision. ¨ Pus draining from the incision. ¨ A fever. · You have symptoms of a blood clot in your arm or leg (called a deep vein thrombosis). These may include: 
¨ Pain in your calf, back of the knee, thigh, or groin. ¨ Redness and swelling in the arm, leg, or groin. Watch closely for any changes in your health, and be sure to contact your doctor if: 
· You do not have a bowel movement after taking a laxative. Where can you learn more? Go to http://jose martin-karrie.info/ Enter Q008 in the search box to learn more about \"Surgery: What to Expect at Home. \" 
© 4432-3799 [employer]wise, [employer]. Care instructions adapted under license by [employer] (which disclaims liability or warranty for this information).  This care instruction is for use with your licensed healthcare professional. If you have questions about a medical condition or this instruction, always ask your healthcare professional. [address] any warranty or liability for your use of this information. Content Version: 91.4.777555; Current as of: November 20, 2015 Acute Pain After Surgery: Care Instructions Your Care Instructions It's common to have some pain after surgery. Pain doesn't mean that something is wrong or that the surgery didn't go well. But when the pain is severe, it's important to work with your doctor to manage it. It's also important to be aware of a few facts about pain and pain medicine. · You are the only person who knows what your pain feels like. So be sure to tell your doctor when you are in pain or when the pain changes. Then he or she will know how to adjust your medicines. · Pain is often easier to control right after it starts. So it may be better to take regular doses of pain medicine and not wait until the pain gets bad. · Medicine can help control pain. But this doesn't mean you'll have no pain. Medicine works to keep the pain at a level you can live with. With time, you will feel better. Follow-up care is a key part of your treatment and safety. Be sure to make and go to all appointments, and call your doctor if you are having problems. It's also a good idea to know your test results and keep a list of the medicines you take. How can you care for yourself at home? · Be safe with medicines. Read and follow all instructions on the label. ¨ If the doctor gave you a prescription medicine for pain, take it as prescribed. ¨ If you are not taking a prescription pain medicine, ask your doctor if you can take an over-the-counter medicine. · If you take an over-the-counter pain medicine, such as acetaminophen (Tylenol), ibuprofen (Advil, Motrin), or naproxen (Aleve), read and follow all instructions on the label. · Do not take two or more pain medicines at the same time unless the doctor told you to. · Do not drink alcohol while you are taking pain medicines. · Try to walk each day if your doctor recommends it. Start by walking a little more than you did the day before. Bit by bit, increase the amount you walk. Walking increases blood flow. It also helps prevent pneumonia and constipation. · To prevent constipation from opioid pain medicines: ¨ Talk to your doctor about a laxative. ¨ Include fruits, vegetables, beans, and whole grains in your diet each day. These foods are high in fiber. ¨ Drink plenty of fluids, enough so that your urine is light yellow or clear like water. Drink water, fruit juice, or other drinks that do not contain caffeine or alcohol. If you have kidney, heart, or liver disease and have to limit fluids, talk with your doctor before you increase the amount of fluids you drink. ¨ Take a fiber supplement, such as Citrucel or Metamucil, every day if needed. Read and follow all instructions on the label. If you take pain medicine for more than a few days, talk to your doctor before you take fiber. When should you call for help? Call your doctor now or seek immediate medical care if: 
? · Your pain gets worse. ? · Your pain is not controlled by medicine. ? Watch closely for changes in your health, and be sure to contact your doctor if you have any problems. Where can you learn more? Go to http://jose martin-karrie.info/. Enter [phone] [phone] in the search box to learn more about \"Acute Pain After Surgery: Care Instructions. \" Current as of: March 20, 2017 Content Version: 11.4 © 8859-7966 [employer]. Care instructions adapted under license by [employer] (which disclaims liability or warranty for this information). If you have questions about a medical condition or this instruction, always ask your healthcare professional. [address] any warranty or liability for your use of this information. Introducing [facility]! Dear Ashley Records: Thank you for requesting a [employer] account. Our records indicate that you already have an active [employer] account. You can access your account anytime at [email or web address] Did you know that you can access your hospital and ER discharge instructions at any time in MicroJob? You can also review all of your test results from your hospital stay or ER visit. Additional Information If you have questions, please visit the Frequently Asked Questions section of the Micro[employer] website at [email or web address] Remember, MicroJob is NOT to be used for urgent needs. For medical emergencies, dial 911. Now available from your iPhone and Android! Please provide this summary of care documentation to your next provider. Your primary care clinician is listed as Edil Leiva. If you have any questions after today's visit, please call [phone].

## 2018-05-30 NOTE — H&P
FOOT AND ANKLE HISTORY AND PHYSICAL      Patient: Trino Woo                   MRN: [id number]         SSN: [id number]  YOB: 1970                     AGE: 50 y.o. SEX: female    Patient scheduled for:  Right Lapidus procedure, first tarsometatarsal joint arthrodesis, modified Ma distal soft tissue procedure, Mazin procedure number two proximal interphalangeal joint resection arthroplasty and implant to the right number two toe, extensor digitorum longus lengthening of the number two toe, extensor digitorum brevis tenotomy possible horizontal capsulotomy of the right number two toe  Date of surgery: 6/14/18   Special Equipment: Arthrex DART Hammertoe Implant  Location of Surgery: [address] [place] [address] at [facility]  Surgeon: Mica Darby. MD Umair  ANESTHESIA TYPE:  General, Popliteal block          PRESCRIPTIONS AND/OR ORDERS PROVIDED DURING H&P:    Orders Placed This Encounter    Generic Supply Order    AMB SUPPLY ORDER    HYDROcodone-acetaminophen (NORCO) 7.5-325 mg per tablet    promethazine (PHENERGAN) 25 mg tablet    polyethylene glycol (MIRALAX) 17 gram packet              HISTORY:     The patient was seen in the office today for a preoperative history and physical for an upcoming above listed surgery. The patient is a pleasant 50 y.o. female who has a history of right of foot pain. Patient reports that she had bilateral bunions for several years, but her chief complaint is due to a right second toe hammering that causes pain and discomfort. Dr. Fernie Herron had a lengthy discussion with the patient about postoperative recovery. She understands that she will be nonweightbearing for at least the first one to two weeks, with some partial weightbearing on the heel just for balance. It takes about three weeks for the incisions to heal at a minimum. Hopefully, the sutures will be out at three weeks.   She will be in a CAM walker boot for upwards of a minimum of two months, and it will be at least two months before she can fit into a tennis shoe. She wants to have the surgery done sometime in June 2018 right after school ends. She is employed as a . Due to the current findings, affected activity of daily living and continued pain and discomfort, surgical intervention is indicated. The alternatives, risks, and complications, including but not limited to infection, blood loss, need for blood transfusion, neurovascular damage, cheyenne-incisional numbness, subcutaneous hematoma, bone fracture, anesthetic complications, DVT, PE, death, RSD, postoperative stiffness and pain, possible surgical scar, delayed healing and nonhealing, reflexive sympathetic dystrophy, damage to blood vessels and nerves, need for more surgery, MI, and stroke have been discussed. The patient understands and wishes to proceed with surgery. PAST MEDICAL HISTORY:     Past Medical History:   Diagnosis Date    Cough     GERD (gastroesophageal reflux disease)     Nausea after anesthesia     Psychiatric disorder     depression    Sinus drainage        CURRENT MEDICATIONS:     Current Outpatient Prescriptions   Medication Sig Dispense Refill    HYDROcodone-acetaminophen (NORCO) 7.5-325 mg per tablet TAKE ONE TO TWO TABLETS BY MOUTH Q 4 - 6 HRS PRN PAIN **AFTER SURGERY**DO NOT TAKE BEFORE SURGERY  Indications: Pain 50 Tab 0    promethazine (PHENERGAN) 25 mg tablet Take 1 Tab by mouth every six (6) hours as needed for Nausea. 30 Tab 0    polyethylene glycol (MIRALAX) 17 gram packet Take 1 Packet by mouth daily. 10 Packet 1    cyclobenzaprine (FLEXERIL) 10 mg tablet TAKE 1 TAB BY MOUTH NIGHTLY AS NEEDED FOR MUSCLE SPASM(S). 30 Tab 1    fexofenadine-pseudoephedrine (ALLEGRA-D 12 HOUR)  mg Tb12 Take 1 Tab by mouth every twelve (12) hours.  melatonin tab tablet Take 10 mg by mouth nightly.       traMADol (ULTRAM) 50 mg tablet Take 1 Tab by mouth every six (6) hours as needed for Pain. Max Daily Amount: 200 mg. Do not take until after surgery 60 Tab 0       ALLERGIES:     No Known Allergies      SURGICAL HISTORY:     Past Surgical History:   Procedure Laterality Date    HX CARPAL TUNNEL RELEASE Right     HX  SECTION      HX GYN      hysterectomy    HX GYN      c/s    SPINAL FUSION,ANT,EA ADNL LEVEL      c-spine       SOCIAL HISTORY:     Social History     Social History    Marital status:      Spouse name: N/A    Number of children: N/A    Years of education: N/A     Social History Main Topics    Smoking status: Current Every Day Smoker     Packs/day: 0.50    Smokeless tobacco: Never Used    Alcohol use Yes      Comment: socially    Drug use: None    Sexual activity: Not Asked     Other Topics Concern    None     Social History Narrative       FAMILY HISTORY:     Family History   Problem Relation Age of Onset    Adopted: Yes    No Known Problems Mother     No Known Problems Father     No Known Problems Sister     No Known Problems Brother        REVIEW OF SYSTEMS:     Negative for fevers, chills, chest pain, shortness of breath, weight loss, recent illness other than sinus drainage    General: Negative for fever and chills. No unexpected change in weight. Denies fatigue. No change in appetite. Skin: Negative for rash or itching. HEENT: Negative for congestion, sore throat, neck pain and neck stiffness. No change in vision or hearing. Hasn't noted any enlarged lymph nodes in the neck. Cardiovascular:  Negative for chest pain and palpitations. Has not noted pedal edema. Respiratory: Negative for cough, colds, sinus, hemoptysis, shortness of breath and wheezing. Gastrointestinal: Negative for nausea and vomiting, rectal bleeding, coffee ground emesis, abdominal pain, diarrhea and constipation. Genitourinary: Negative for dysuria, frequency urgency, or burning on micturition.  No flank pain, no foul smelling urine, no difficulty with initiating urination. Hematological: Negative for bleeding or easy bruising. Musculoskeletal: Negative  for arthralgias, back pain or neck pain. Neurological: Negative for dizziness, seizures or syncopal episodes. Denies headaches. Endocrine: Denies excessive thirst.  No heat/cold intolerance. Psychiatric: Negative for depression or insomnia. PHYSICAL EXAMINATION:     VITALS:   Visit Vitals    /77    Pulse (!) 102    Temp 97.8 °F (36.6 °C) (Oral)    Resp 16    Ht 5' 2\" (1.575 m)    Wt 187 lb (84.8 kg)    SpO2 97%    BMI 34.2 kg/m2       GEN:  Well developed, well nourished 50 y.o. female in no acute distress. PSYCH: Alert an oriented to person, place and time. Mood, memory, affect, behavior and judgment normal  HEENT: Normocephalic and atraumatic. Eyes: Conjunctivae and EOM are normal.Pupils are equal, round, and reactive to light. External ear normal appearance, external nose normal appearing. Mouth/Throat: Oropharynx is clear and moist, able to handle oral secretions w/out difficulty, airway patent  NECK: Supple. Normal ROM, No lymphadenopathy. Trachea is midline. No bruising, swelling or deformity  RESP: Clear to auscultation bilaterally. No wheezes, rales, rhonchi. Normal effort and breath sounds. No respiratory distress  CARDIO:Tachycardia noted, normal heart sounds. No MGR. ABDOMEN: Soft, non-tender, non-distended, normoactive bowel sounds in all four quadrants. There is no tenderness. There is no rebound and no guarding. BACK: No CVA or spinal tenderness  BREAST:  Deferred  PELVIC:    Deferred   RECTAL:  Deferred   :           Deferred  EXTREMITIES: EXAMINATION OF:  LOCATION:  Right FOOT/ANKLE  Integumentary: No rashes, skin patches, wounds, or abrasions to the right or left legs   Warm and normal color. No regions of expressible drainage.    Callus to dorsal Hammer toe is not present, Calluses to plantar foot is not present    Fullness (fixed superficial//non mobile) along the plantar medial arch, not associated with FHL tendon. Gait:  Slight Limp with gait  Alignment: moderate Bunion Alignment is present with pronation, adduction and #1 and #2 toe abutment. severe Hammertoe(s) present  (Rigid) 2   Tenderness: mild Medial Eminence of Bunion    None to Hammer Toes   NONE to Medial Malleolar, 4/5 Met base midfoot, achilles, tib post, or   NONE to syndesmosis. Motor/Strength/Tone Exam: Normal Dorsiflexion/Plantar Flexion of a great toe MTP    Sensory Exam:   Intact Normal Sensation to ankle/foot, but slight decreased             sensation to medial cutaneous nerve along great toe MTP medial             eminence region. Stability Testing:  YES SPRING back mobility test for 1st metatarsal               Yes spring back at: RIGHT 1st TMT instability                No anterolateral or varus instability of the Ankle or Subtalar Joints               No peroneal tendon instability noted  ROM: Normal ROM noted to ankle  Contractures: No Achilles or Gastrocnemius Contractures  Calf tenderness: Absent for calf or gastrocnemius muscle regions       Soft, supple, non tender, non taut lower extremity compartments  Alignment: Neutral Hindfoot  Wounds/Abrasions:   None present  Extremities:   No embolic phenomena to the toes or hands         No significant edema to the foot and or toes. Lower extremities are warm and appear well perfused    DVT: No evidence of DVT seen on examination at this time     No calf swelling, no tenderness to calf muscles  Lymphatic:  No Evidence of Lymphedema  Vascular: Medial Border of Tibia Region: Edema is not present        Pulses: Dorsalis Pedis &  Posterior Tibial Pulses : Palpable yes        Varicosities Lower Limbs :  None    Neuro: Negative bilateral Straight leg raise (seated position)    See Musculoskeletal section for pertinent individual extremity examination    No abnormal hand/wrist, foot/ankle, or facial/neck tremors.     RADIOGRAPHS & DIAGNOSTIC STUDIES: FOOT X RAYS 3 VIEWS Right   1/31/2018    WEIGHT BEARING    X RAYS AT [facility] [address] [place] [address]  1/31/2018      Bones: No fractures or dislocations. No focal osteolytic or osteoblastic process    Bone Spurs: Inferior Calcaneal Bone Spur   Alignment: Bunion Alignment: increased IM and HVA    moderate Hallux Valgus Alignment, moderate increased IM ANGLE (1st/2nd)   Metatarsus Adductus is absent   Midfoot Alignment is WNL. Joint: Slightly Incongruent  Soft Tissues: Soft Tissues Normal     No ankle joint effusion in lateral projection. Mineralization: Suggests Normal Bone        LABS:     @  CBC:   Lab Results   Component Value Date/Time    WBC 10.6 05/25/2018 04:17 PM    RBC 4.62 05/25/2018 04:17 PM    HGB 13.8 05/25/2018 04:17 PM    HCT 40.8 05/25/2018 04:17 PM    PLATELET 265 33/28/5965 04:17 PM   , CMP:   Lab Results   Component Value Date/Time    Glucose 96 05/25/2018 04:17 PM    Sodium 142 05/25/2018 04:17 PM    Potassium 3.6 05/25/2018 04:17 PM    Chloride 105 05/25/2018 04:17 PM    CO2 29 05/25/2018 04:17 PM    BUN 16 05/25/2018 04:17 PM    Creatinine 0.97 05/25/2018 04:17 PM    Calcium 9.2 05/25/2018 04:17 PM    Anion gap 8 05/25/2018 04:17 PM    BUN/Creatinine ratio 16 05/25/2018 04:17 PM    Alk. phosphatase 89 05/25/2018 04:17 PM    Protein, total 7.0 05/25/2018 04:17 PM    Albumin 4.0 05/25/2018 04:17 PM    Globulin 3.0 05/25/2018 04:17 PM    A-G Ratio 1.3 05/25/2018 04:17 PM    and Coagulation:   Lab Results   Component Value Date/Time    Prothrombin time 12.2 05/25/2018 04:17 PM    INR 1.0 05/25/2018 04:17 PM   @    Preoperative labs were reviewed and are substantially within normal limits  Chest X-ray revealed no acute cardiopulmonary process. Scoliosis noted.   EKG:     Normal sinus rhythm   Prolonged QT   Abnormal ECG   When compared with ECG of 17-FEB-2017 11:50,   No significant change was found   Confirmed by Liliana Mejia MD, Madelyn Gonzales ([id number]) on 5/26/2018 11:46:20 AM     ASSESSMENT:       Encounter Diagnoses Name Primary?  Hammertoe of second toe of right foot Yes    Bunion of right foot     Pre-operative examination        PLAN:     Again, the alternatives, risks, and complications, as well as expected outcome were discussed. The patient understands and agrees to proceed with the above listed surgery pending EKG review by anesthesia. Patient has been given Hibiclens wash with instructions and prescriptions and or orders listed above.     Ksenia Molina PA-C  5/30/2018  4:12 PM

## 2018-06-12 ENCOUNTER — DOCUMENTATION ONLY (OUTPATIENT)
Dept: ORTHOPEDIC SURGERY | Age: 48
End: 2018-06-12

## 2018-06-12 NOTE — PROGRESS NOTES
MARRY Astorga has asked that I send EKG to Anesthesia to make sure that Angelina Briones is ok for surgery. I called Daljit Campbell with PAT at [facility] and asked her to ask anesthesia to look at EKG. Daljit Campbell called back and said that Dr. Latasha Mabry said that the EKG was fine for surgery.

## 2018-06-19 ENCOUNTER — OFFICE VISIT (OUTPATIENT)
Dept: ORTHOPEDIC SURGERY | Age: 48
End: 2018-06-19

## 2018-06-19 VITALS
RESPIRATION RATE: 16 BRPM | SYSTOLIC BLOOD PRESSURE: 131 MMHG | DIASTOLIC BLOOD PRESSURE: 73 MMHG | HEART RATE: 100 BPM | HEIGHT: 62 IN | TEMPERATURE: 96.8 F | OXYGEN SATURATION: 94 %

## 2018-06-19 DIAGNOSIS — M20.41 HAMMERTOE OF SECOND TOE OF RIGHT FOOT: ICD-10-CM

## 2018-06-19 DIAGNOSIS — M21.611 BUNION OF RIGHT FOOT: ICD-10-CM

## 2018-06-19 DIAGNOSIS — Z98.890 POST-OPERATIVE STATE: ICD-10-CM

## 2018-06-19 DIAGNOSIS — M79.671 RIGHT FOOT PAIN: Primary | ICD-10-CM

## 2018-06-20 NOTE — PROCEDURES
X-rays, 3  views of the right foot reveals post op changes s/p lapidus, TMT fusion, Akin, and #2 toe implant procedure. Overall alignment looks good.

## 2018-06-20 NOTE — PROGRESS NOTES
Patient: Robel Dupree                MRN: [id number]       SSN: [id number]  YOB: 1970                   AGE: 50 y.o. SEX: female    Bishop Marshall MD    POST OP OFFICE NOTE  DOS: 6/14/18    Chief Complaint:   Chief Complaint   Patient presents with    Foot Pain     right foot post op bunionectomy       HPI:     The patient is a 50 y.o. female who presents today for follow up 5 days s/p Right Lapidus procedure, first tarsometatarsal joint arthrodesis, modified Ma distal soft tissue procedure, Mazin procedure number two proximal interphalangeal joint resection arthroplasty and implant to the right number two toe, extensor digitorum longus lengthening of the number two toe, extensor digitorum brevis tenotomy     Patient has been NWB to the right lower extremity. Patient reports doing well other than post op pain. Patient denies any fever, chills, chest pain, shortness of breath or calf pain. There are no new illness or injuries to report since last seen in the office. PHYSICAL EXAM:     Visit Vitals    /73    Pulse 100    Temp 96.8 °F (36 °C) (Oral)    Resp 16    Ht 5' 2\" (1.575 m)    SpO2 94%         Pain Scale: /10      GEN:  Alert, well developed, well nourished, well appearing 50 y.o. female in no acute distress. PSYCH:  Normal affect, mood, and conduct. alert, oriented x 3 alert, oriented x 3, no dementia  M/S EXAMINATION OF: right lower extremity  DRESSINGS: CDI   DRAINAGE: none  INCISION: Incision looks good, skin well approximated, no dehiscence, nylon sutures in place without:5700} disruption. SKIN: moderate edema , no erythema, mild ecchymosis, no warmth    TENDERNESS:  Mild tenderness to palpation (as expected after surgery)  NEUROVASCULAR:  grossly intact. Positive distal pulses and capillary refill. DVT ASSESSMENT:  The calf non tender to palpation.  No evidence of DVT seen on physical exam.  ROM: not tested       RADIOGRAPHS & DIAGNOSTIC STUDIES     Results for orders placed or performed in visit on 06/19/18   AMB POC XRAY, FOOT; COMPLETE, 3+ VIEW    Narrative    Nadia Hein PA-C     6/19/2018  8:58 PM  X-rays, 3  views of the right foot reveals post op changes s/p   lapidus, TMT fusion, Akin, and #2 toe implant procedure. Overall   alignment looks good. IMPRESSION:     Encounter Diagnoses     ICD-10-CM ICD-9-CM   1. Right foot pain M79.671 729.5   2. Hammertoe of second toe of right foot M20.41 735.4   3. Bunion of right foot M21.611 727.1   4. Post-operative state Z98.890 V45.89       PLAN:         Orders Placed This Encounter    [70049] Foot Min 3V                  There are no Patient Instructions on file for this visit.            · Continue Activity modification    · Weight bearing status:  non weight bearing to RLE lower extremity    · No lifting, twisting, squatting, deep bending, driving or strenuous activity.     PLEASE SEEK IMMEDIATE ASSESSMENT BY ER PHYSICIAN IF ANY OF THE FOLLOWING EXIST:     Excessive pain, swelling, redness or odor of or around the surgical area   Temperature over 100.5   Nausea and vomiting lasting longer than 4 hours or if unable to take medications   Any signs of decreased circulation or nerve impairment to extremity: change in color, persistent numbness, tingling, coldness or increase pain   If any calf pain, calf tightness, shortness of breath, chest pain   Any difficulty breathing at rest or with ambulation, any chest tightness/soreness  Severe intractable pain, persistent swelling or drainage, development of a wound, incisional redness, finger/toe swelling or color changes, or CALF PAIN    · Perform ankle pumps with non-surgical/non-injured extremity to help reduce the risk of blood clots    · Please follow up in 1 week    **COMPUTER SYSTEM DOWN MAJORITY OF THE DAY**      REVIEW OF SYSTEMS:     Otherwise as noted in HPI      PAST MEDICAL HISTORY:     Past Medical History:   Diagnosis Date  Cough     GERD (gastroesophageal reflux disease)     Nausea after anesthesia     Psychiatric disorder     depression    Sinus drainage        MEDICATIONS:     Current Outpatient Prescriptions   Medication Sig    HYDROcodone-acetaminophen (NORCO) 7.5-325 mg per tablet TAKE ONE TO TWO TABLETS BY MOUTH Q 4 - 6 HRS PRN PAIN **AFTER SURGERY**DO NOT TAKE BEFORE SURGERY  Indications: Pain    promethazine (PHENERGAN) 25 mg tablet Take 1 Tab by mouth every six (6) hours as needed for Nausea.  polyethylene glycol (MIRALAX) 17 gram packet Take 1 Packet by mouth daily.  cyclobenzaprine (FLEXERIL) 10 mg tablet TAKE 1 TAB BY MOUTH NIGHTLY AS NEEDED FOR MUSCLE SPASM(S).  fexofenadine-pseudoephedrine (ALLEGRA-D 12 HOUR)  mg Tb12 Take 1 Tab by mouth every twelve (12) hours.  melatonin tab tablet Take 10 mg by mouth nightly.  traMADol (ULTRAM) 50 mg tablet Take 1 Tab by mouth every six (6) hours as needed for Pain. Max Daily Amount: 200 mg. Do not take until after surgery     No current facility-administered medications for this visit. ALLERGIES:     No Known Allergies      PAST SURGICAL HISTORY:     Past Surgical History:   Procedure Laterality Date    HX CARPAL TUNNEL RELEASE Right     HX  SECTION      HX GYN      hysterectomy    HX GYN      c/s    SPINAL FUSION,ANT,EA ADNL LEVEL      c-spine       SOCIAL HISTORY:     Social History     Social History    Marital status:      Spouse name: N/A    Number of children: N/A    Years of education: N/A     Occupational History    Not on file. Social History Main Topics    Smoking status: Current Every Day Smoker     Packs/day: 0.50    Smokeless tobacco: Never Used    Alcohol use Yes      Comment: socially    Drug use: Not on file    Sexual activity: Not on file     Other Topics Concern    Not on file     Social History Narrative       FAMILY HISTORY:     Family History   Problem Relation Age of Onset    Adopted:  Yes  No Known Problems Mother     No Known Problems Father     No Known Problems Sister     No Known Problems Brother            Dea Bolaños PA-C  6/19/2018

## 2018-06-26 ENCOUNTER — OFFICE VISIT (OUTPATIENT)
Dept: ORTHOPEDIC SURGERY | Age: 48
End: 2018-06-26

## 2018-06-26 VITALS
OXYGEN SATURATION: 96 % | HEIGHT: 62 IN | RESPIRATION RATE: 16 BRPM | SYSTOLIC BLOOD PRESSURE: 124 MMHG | TEMPERATURE: 96.2 F | DIASTOLIC BLOOD PRESSURE: 70 MMHG | HEART RATE: 100 BPM

## 2018-06-26 DIAGNOSIS — Z98.890 POST-OPERATIVE STATE: ICD-10-CM

## 2018-06-26 DIAGNOSIS — M79.671 RIGHT FOOT PAIN: Primary | ICD-10-CM

## 2018-06-26 DIAGNOSIS — M21.611 BUNION OF RIGHT FOOT: ICD-10-CM

## 2018-06-26 DIAGNOSIS — M20.41 HAMMERTOE OF SECOND TOE OF RIGHT FOOT: ICD-10-CM

## 2018-06-26 RX ORDER — HYDROCODONE BITARTRATE AND ACETAMINOPHEN 5; 325 MG/1; MG/1
TABLET ORAL
Qty: 42 TAB | Refills: 0 | Status: SHIPPED | OUTPATIENT
Start: 2018-06-26 | End: 2018-07-10 | Stop reason: SDUPTHER

## 2018-06-26 NOTE — MR AVS SNAPSHOT
[address] [place] [address], [address] [facility] 
[phone] Patient: Kelly England MRN: [id number] :1970 Visit Information Date & Time Provider Department Dept. Phone Encounter #  
 2018  9:30 AM Jorge Juárez [address] [facility] [phone] Follow-up Instructions Return in about 1 week (around 7/3/2018) for follow up evaluation. Upcoming Health Maintenance Date Due Pneumococcal 19-64 Medium Risk (1 of 1 - PPSV23) 1987 DTaP/Tdap/Td series (1 - Tdap) 1989 PAP AKA CERVICAL CYTOLOGY 1989 Influenza Age 5 to Adult 2018 Allergies as of 2018  Review Complete On: 2018 By: Jorge Juárez PA-C No Known Allergies Current Immunizations  Never Reviewed No immunizations on file. Not reviewed this visit You Were Diagnosed With   
  
 Codes Comments Right foot pain    -  Primary ICD-10-CM: O44.920 ICD-9-CM: 729.5 Hammertoe of second toe of right foot     ICD-10-CM: M20.41 ICD-9-CM: 735.4 Bunion of right foot     ICD-10-CM: M21.611 ICD-9-CM: 727.1 Post-operative state     ICD-10-CM: R10.592 ICD-9-CM: V45.89 Vitals BP Pulse Temp Resp Height(growth percentile) SpO2  
 124/70 100 96.2 °F (35.7 °C) (Oral) 16 5' 2\" (1.575 m) 96% OB Status Smoking Status Hysterectomy Current Every Day Smoker Preferred Pharmacy Pharmacy Name Phone [id number] [place], [address] [facility] [facility] [phone] Your Updated Medication List  
  
   
This list is accurate as of 18 10:43 AM.  Always use your most recent med list. ALLEGRA-D 12 HOUR  mg Tb12 Generic drug:  fexofenadine-pseudoephedrine Take 1 Tab by mouth every twelve (12) hours. cyclobenzaprine 10 mg tablet Commonly known as:  FLEXERIL  
 TAKE 1 TAB BY MOUTH NIGHTLY AS NEEDED FOR MUSCLE SPASM(S). HYDROcodone-acetaminophen 7.5-325 mg per tablet Commonly known as:  NORCO  
TAKE ONE TO TWO TABLETS BY MOUTH Q 4 - 6 HRS PRN PAIN **AFTER SURGERY**DO NOT TAKE BEFORE SURGERY  Indications: Pain  
  
 melatonin Tab tablet Take 10 mg by mouth nightly. polyethylene glycol 17 gram packet Commonly known as:  Yifan Bimler Take 1 Packet by mouth daily. promethazine 25 mg tablet Commonly known as:  PHENERGAN Take 1 Tab by mouth every six (6) hours as needed for Nausea. traMADol 50 mg tablet Commonly known as:  ULTRAM  
Take 1 Tab by mouth every six (6) hours as needed for Pain. Max Daily Amount: 200 mg. Do not take until after surgery Follow-up Instructions Return in about 1 week (around 7/3/2018) for follow up evaluation. Patient Instructions · Continue Activity modification · Weight bearing status:  non weight bearing to RLE lower extremity · No lifting, twisting, squatting, deep bending, driving or strenuous activity. PLEASE SEEK IMMEDIATE ASSESSMENT BY ER PHYSICIAN IF ANY OF THE FOLLOWING EXIST:  
 
Excessive pain, swelling, redness or odor of or around the surgical area Temperature over 100.5 Nausea and vomiting lasting longer than 4 hours or if unable to take medications Any signs of decreased circulation or nerve impairment to extremity: change in color, persistent numbness, tingling, coldness or increase pain If any calf pain, calf tightness, shortness of breath, chest pain Any difficulty breathing at rest or with ambulation, any chest tightness/soreness Severe intractable pain, persistent swelling or drainage, development of a wound, incisional redness, finger/toe swelling or color changes, or CALF PAIN 
 
· Perform ankle pumps with non-surgical/non-injured extremity to help reduce the risk of blood clots · Please follow up in 1 week Introducing [facility]! Dear Lary Felipe: Thank you for requesting a [email or web address] account. Our records indicate that you already have an active [email or web address] account. You can access your account anytime at [email or web address] [email or web address] Did you know that you can access your hospital and ER discharge instructions at any time in [email or web address]? You can also review all of your test results from your hospital stay or ER visit. Additional Information If you have questions, please visit the Frequently Asked Questions section of the [email or web address] website at [email or web address] Remember, [email or web address] is NOT to be used for urgent needs. For medical emergencies, dial 911. Now available from your iPhone and Android! Please provide this summary of care documentation to your next provider. Your primary care clinician is listed as Ernestine Montero. If you have any questions after today's visit, please call [phone].

## 2018-06-26 NOTE — PROGRESS NOTES
Patient: Evert Man                MRN: [id number]       SSN: [id number]  YOB: 1970                   AGE: 50 y.o. SEX: female    Arielle Johnson MD    POST OP OFFICE NOTE  DOS: 6/14/18    Chief Complaint:   Chief Complaint   Patient presents with    Foot Pain     right foot pain       HPI:     The patient is a 50 y.o. female who presents today for follow up 12 days s/p Right Lapidus procedure, first tarsometatarsal joint arthrodesis, modified Ma distal soft tissue procedure, Mazin procedure number two proximal interphalangeal joint resection arthroplasty and implant to the right number two toe, extensor digitorum longus lengthening of the number two toe, extensor digitorum brevis tenotomy     Patient has been NWB to the right lower extremity. Patient reports doing well other than post op pain. Patient denies any fever, chills, chest pain, shortness of breath or calf pain. There are no new illness or injuries to report since last seen in the office. Patients friend reports that patient cut grass last week. Patient states that she wrapped the RLE well and held her foot up the entire time. She states he had some increased swelling as she has not been elevating her foot very much. PHYSICAL EXAM:     Visit Vitals    /70    Pulse 100    Temp 96.2 °F (35.7 °C) (Oral)    Resp 16    Ht 5' 2\" (1.575 m)    SpO2 96%         Pain Scale: /10      GEN:  Alert, well developed, well nourished, well appearing 50 y.o. female in no acute distress. PSYCH:  Normal affect, mood, and conduct. alert, oriented x 3 alert, oriented x 3, no dementia  M/S EXAMINATION OF: right lower extremity  DRESSINGS: CDI   DRAINAGE: none  INCISION: Incision looks good, skin well approximated, no dehiscence, nylon sutures in place without disruption.   SKIN: moderate edema , no erythema, mild ecchymosis, no warmth    TENDERNESS:  Mild tenderness to palpation   NEUROVASCULAR:  grossly intact. Positive distal pulses and capillary refill. Patient reports some tingling sensation  DVT ASSESSMENT:  The calf non tender to palpation. No evidence of DVT seen on physical exam.  ROM: not tested       RADIOGRAPHS & DIAGNOSTIC STUDIES     No results found for any visits on 06/26/18. IMPRESSION:     Encounter Diagnoses     ICD-10-CM ICD-9-CM   1. Right foot pain M79.671 729.5   2. Hammertoe of second toe of right foot M20.41 735.4   3. Bunion of right foot M21.611 727.1   4. Post-operative state Z98.890 V45.89       PLAN:         Orders Placed This Encounter    HYDROcodone-acetaminophen (NORCO) 5-325 mg per tablet              · Continue Activity modification    · Weight bearing status:  non weight bearing to RLE lower extremity    · No lifting, twisting, squatting, deep bending, driving or strenuous activity.     PLEASE SEEK IMMEDIATE ASSESSMENT BY ER PHYSICIAN IF ANY OF THE FOLLOWING EXIST:     Excessive pain, swelling, redness or odor of or around the surgical area   Temperature over 100.5   Nausea and vomiting lasting longer than 4 hours or if unable to take medications   Any signs of decreased circulation or nerve impairment to extremity: change in color, persistent numbness, tingling, coldness or increase pain   If any calf pain, calf tightness, shortness of breath, chest pain   Any difficulty breathing at rest or with ambulation, any chest tightness/soreness  Severe intractable pain, persistent swelling or drainage, development of a wound, incisional redness, finger/toe swelling or color changes, or CALF PAIN    · Perform ankle pumps with non-surgical/non-injured extremity to help reduce the risk of blood clots    · Please follow up in 1 week       REVIEW OF SYSTEMS:     Otherwise as noted in HPI      PAST MEDICAL HISTORY:     Past Medical History:   Diagnosis Date    Cough     GERD (gastroesophageal reflux disease)     Nausea after anesthesia     Psychiatric disorder     depression    Sinus drainage        MEDICATIONS:     Current Outpatient Prescriptions   Medication Sig    HYDROcodone-acetaminophen (NORCO) 5-325 mg per tablet 1-2 tablets every 4-6 hours prn pain    promethazine (PHENERGAN) 25 mg tablet Take 1 Tab by mouth every six (6) hours as needed for Nausea.  polyethylene glycol (MIRALAX) 17 gram packet Take 1 Packet by mouth daily.  cyclobenzaprine (FLEXERIL) 10 mg tablet TAKE 1 TAB BY MOUTH NIGHTLY AS NEEDED FOR MUSCLE SPASM(S).  fexofenadine-pseudoephedrine (ALLEGRA-D 12 HOUR)  mg Tb12 Take 1 Tab by mouth every twelve (12) hours.  melatonin tab tablet Take 10 mg by mouth nightly. No current facility-administered medications for this visit. ALLERGIES:     No Known Allergies      PAST SURGICAL HISTORY:     Past Surgical History:   Procedure Laterality Date    HX CARPAL TUNNEL RELEASE Right     HX  SECTION      HX GYN      hysterectomy    HX GYN      c/s    SPINAL FUSION,ANT,EA ADNL LEVEL      c-spine       SOCIAL HISTORY:     Social History     Social History    Marital status:      Spouse name: N/A    Number of children: N/A    Years of education: N/A     Occupational History    Not on file. Social History Main Topics    Smoking status: Current Every Day Smoker     Packs/day: 0.50    Smokeless tobacco: Never Used    Alcohol use Yes      Comment: socially    Drug use: Not on file    Sexual activity: Not on file     Other Topics Concern    Not on file     Social History Narrative       FAMILY HISTORY:     Family History   Problem Relation Age of Onset    Adopted:  Yes    No Known Problems Mother     No Known Problems Father     No Known Problems Sister     No Known Problems Brother            [address] Jay Johnson PA-C  2018

## 2018-06-26 NOTE — PATIENT INSTRUCTIONS
· Continue Activity modification    · Weight bearing status:  non weight bearing to RLE lower extremity    · No lifting, twisting, squatting, deep bending, driving or strenuous activity.     PLEASE SEEK IMMEDIATE ASSESSMENT BY ER PHYSICIAN IF ANY OF THE FOLLOWING EXIST:     Excessive pain, swelling, redness or odor of or around the surgical area   Temperature over 100.5   Nausea and vomiting lasting longer than 4 hours or if unable to take medications   Any signs of decreased circulation or nerve impairment to extremity: change in color, persistent numbness, tingling, coldness or increase pain   If any calf pain, calf tightness, shortness of breath, chest pain   Any difficulty breathing at rest or with ambulation, any chest tightness/soreness  Severe intractable pain, persistent swelling or drainage, development of a wound, incisional redness, finger/toe swelling or color changes, or CALF PAIN    · Perform ankle pumps with non-surgical/non-injured extremity to help reduce the risk of blood clots    · Please follow up in 1 week

## 2018-07-05 ENCOUNTER — OFFICE VISIT (OUTPATIENT)
Dept: ORTHOPEDIC SURGERY | Age: 48
End: 2018-07-05

## 2018-07-05 VITALS
TEMPERATURE: 96.8 F | HEIGHT: 62 IN | RESPIRATION RATE: 16 BRPM | OXYGEN SATURATION: 94 % | HEART RATE: 91 BPM | SYSTOLIC BLOOD PRESSURE: 117 MMHG | DIASTOLIC BLOOD PRESSURE: 80 MMHG

## 2018-07-05 DIAGNOSIS — M21.611 BUNION, RIGHT FOOT: Primary | ICD-10-CM

## 2018-07-05 NOTE — Clinical Note
DIAGNOSTIC STUDIES: X-rays of the right foot, three views, show postop changes from a right Lapidus first TMT joint arthrodesis. She has the Lapiplasty tube placed that are in good position. She also has a compression screw, as well as an Akin closing wedge osteotomy staple, as well as a hammerlock implant in the number two toe for her hammertoe. The overall alignment on these nonweightbearing x-rays look good. There are no fracture, subluxation, or dislocation. The proposed arthrodesis site at the first TMT has not fully united. (She is only 19 days out from her surgery.)  The overall alignment looks very good relative to her preop.

## 2018-07-05 NOTE — PROGRESS NOTES
AMBULATORY PROGRESS NOTE      Patient: Ernesto Street             MRN: [id number]     SSN: [id number] There is no height or weight on file to calculate BMI. YOB: 1970     AGE: 50 y.o. EX: female    PCP: Christopher Pineda MD    IMPRESSION/DIAGNOSIS AND TREATMENT PLAN     DIAGNOSES  1. Bunion, right foot        Orders Placed This Encounter    AMB POC XRAY, FOOT; COMPLETE, 3+ VIEW      Ernesto Street understands her diagnoses and the proposed plan. Plan:    1) New dressings provided. 2) Stitches removed at the office. RTO - 1 week    HPI AND EXAMINATION     Ernesto Street IS A 50 y.o. female who presents to my outpatient office for follow up 21 days s/p Right Lapidus procedure, first tarsometatarsal joint arthrodesis, modified Ma distal soft tissue procedure, Mazin procedure number two proximal interphalangeal joint resection arthroplasty and implant to the right number two toe, extensor digitorum longus lengthening of the number two toe, extensor digitorum brevis tenotomy. At last visit, Rolando Woodard PA-C, prescribed Norco 5 mg, continue activity modification as directed, and instructed to perform ankle pumps. Since we saw her last, Ms. Sagastume reports she can experience some itchiness to the dorsal incisions along the right foot. Patient denies any major pain or discomfort. She has been using a knee walker to get around. She reports there is tingling along the plantar aspect of her great toe, but she has intact sensation along the rest of her forefoot. Patient reports she smokes about 1 pack per day. I very strongly urged Ernesto Street to quit smoking to reduce cardiovascular risk and to promote bone healing. Ernesto Street understands the cardiovascular and orthopaedic risks of continued cigarette smoking. GEN:  Alert, well developed, well nourished, well appearing 50 y.o. female in no acute distress. PSYCH:  Normal affect, mood, and conduct.  alert, oriented x 3 alert, oriented x 3, no dementia  M/S EXAMINATION OF: right lower extremity  DRESSINGS: CDI   DRAINAGE: none  INCISION: Incision looks good, skin well approximated, no dehiscence, nylon sutures in place without disruption. SELECTIVE SUTURES REMOVED  SKIN: mild edema , no erythema, mild ecchymosis, no warmth    TENDERNESS:  Mild tenderness to palpation   NEUROVASCULAR:  grossly intact. Positive distal pulses and capillary refill. Patient reports some tingling sensation  DVT ASSESSMENT:  The calf non tender to palpation. No evidence of DVT seen on physical exam.  ROM: not tested    CHART REVIEW     Past Medical History:   Diagnosis Date    Cough     GERD (gastroesophageal reflux disease)     Nausea after anesthesia     Psychiatric disorder     depression    Sinus drainage      Current Outpatient Prescriptions   Medication Sig    HYDROcodone-acetaminophen (NORCO) 5-325 mg per tablet 1-2 tablets every 4-6 hours prn pain    fexofenadine-pseudoephedrine (ALLEGRA-D 12 HOUR)  mg Tb12 Take 1 Tab by mouth every twelve (12) hours.  promethazine (PHENERGAN) 25 mg tablet Take 1 Tab by mouth every six (6) hours as needed for Nausea.  polyethylene glycol (MIRALAX) 17 gram packet Take 1 Packet by mouth daily.  cyclobenzaprine (FLEXERIL) 10 mg tablet TAKE 1 TAB BY MOUTH NIGHTLY AS NEEDED FOR MUSCLE SPASM(S).  melatonin tab tablet Take 10 mg by mouth nightly. No current facility-administered medications for this visit. No Known Allergies  Past Surgical History:   Procedure Laterality Date    HX CARPAL TUNNEL RELEASE Right     HX  SECTION      HX GYN      hysterectomy    HX GYN      c/s    SPINAL FUSION,ANT,EA ADNL LEVEL      c-spine     Social History     Occupational History    Not on file.      Social History Main Topics    Smoking status: Current Every Day Smoker     Packs/day: 0.50    Smokeless tobacco: Never Used    Alcohol use Yes      Comment: socially    Drug use: Not on file    Sexual activity: Not on file     Family History   Problem Relation Age of Onset    Adopted: Yes    No Known Problems Mother     No Known Problems Father     No Known Problems Sister     No Known Problems Brother        REVIEW OF SYSTEMS : 7/5/2018  ALL BELOW ARE Negative except : SEE HPI       Constitutional: Negative for fever, chills and weight loss. Neg Weigh Loss  Cardiovascular: Negative for chest pain, claudication and leg swelling. SOB, LEWIS   Gastrointestinal: Negative for  pain, N/V/D/C, Blood in stool or urine,dysuria, hematuria,        Incontinence, pelvic pain  Musculoskeletal: see HPI. Neurological: Negative for dizziness and weakness. Negative for headaches,Visual Changes, Confusion, Seizures,   Psychiatric/Behavioral: Negative for depression, memory loss and substance abuse. Extremities:  Negative for  hair changes, rash or skin lesion changes. Hematologic: Negative for Bleeding problems, bruising, pallor or swollen lymph nodes. Peripheral Vascular: No calf pain, vascular vein tenderness to calf pain              No calf throbbing, posterior knee throbbing pain    DIAGNOSTIC IMAGING      Dictation on: 07/05/2018  5:35 PM by: Marv Mata [[id number]]         Written by Betsey Thompson, as dictated by Lois Ozuna MD. IDr., Lois Ozuna MD, confirm that all documentation is accurate.

## 2018-07-05 NOTE — MR AVS SNAPSHOT
[place], [address] [facility] 
[phone] Patient: [facility] MRN: [id number] :1970 Visit Information Date & Time Provider Department Dept. Phone Encounter #  
 2018  3:15 PM Goyo Holcomb MD [facility] [phone] Follow-up Instructions Return in about 2 weeks (around 2018). Upcoming Health Maintenance Date Due Pneumococcal 19-64 Medium Risk (1 of 1 - PPSV23) 1987 DTaP/Tdap/Td series (1 - Tdap) 1989 PAP AKA CERVICAL CYTOLOGY 1989 Influenza Age 5 to Adult 2018 Allergies as of 2018  Review Complete On: 2018 By: Candelaria Allison No Known Allergies Current Immunizations  Never Reviewed No immunizations on file. Not reviewed this visit You Were Diagnosed With   
  
 Codes Comments Bunion, right foot    -  Primary ICD-10-CM: M21.611 ICD-9-CM: 727.1 Vitals BP Pulse Temp Resp Height(growth percentile) SpO2  
 117/80 91 96.8 °F (36 °C) (Oral) 16 5' 2\" (1.575 m) 94% OB Status Smoking Status Hysterectomy Current Every Day Smoker Preferred Pharmacy Pharmacy Name Phone [id number] [address] [facility] [phone] Your Updated Medication List  
  
   
This list is accurate as of 18  5:00 PM.  Always use your most recent med list. ALLEGRA-D 12 HOUR  mg Tb12 Generic drug:  fexofenadine-pseudoephedrine Take 1 Tab by mouth every twelve (12) hours. cyclobenzaprine 10 mg tablet Commonly known as:  FLEXERIL  
TAKE 1 TAB BY MOUTH NIGHTLY AS NEEDED FOR MUSCLE SPASM(S). HYDROcodone-acetaminophen 5-325 mg per tablet Commonly known as:  NORCO  
1-2 tablets every 4-6 hours prn pain  
  
 melatonin Tab tablet Take 10 mg by mouth nightly. polyethylene glycol 17 gram packet Commonly known as:  Isaiah Chance Take 1 Packet by mouth daily. promethazine 25 mg tablet Commonly known as:  PHENERGAN Take 1 Tab by mouth every six (6) hours as needed for Nausea. We Performed the Following AMB POC XRAY, FOOT; COMPLETE, 3+ VIEW [85430 CPT(R)] Comments: ASK ALL FEMALE PATIENTS IF PREGNANT Follow-up Instructions Return in about 2 weeks (around 7/19/2018). Patient Instructions Please follow up in 1 week. You are advised to contact us if your condition worsens. Foot Pain: Care Instructions Your Care Instructions Foot injuries that cause pain and swelling are fairly common. Almost all sports or home repair projects can cause a misstep that ends up as foot pain. Normal wear and tear, especially as you get older, also can cause foot pain. Most minor foot injuries will heal on their own, and home treatment is usually all you need to do. If you have a severe injury, you may need tests and treatment. Follow-up care is a key part of your treatment and safety. Be sure to make and go to all appointments, and call your doctor if you are having problems. It's also a good idea to know your test results and keep a list of the medicines you take. How can you care for yourself at home? · Take pain medicines exactly as directed. ¨ If the doctor gave you a prescription medicine for pain, take it as prescribed. ¨ If you are not taking a prescription pain medicine, ask your doctor if you can take an over-the-counter medicine. · Rest and protect your foot. Take a break from any activity that may cause pain. · Put ice or a cold pack on your foot for 10 to 20 minutes at a time. Put a thin cloth between the ice and your skin. · Prop up the sore foot on a pillow when you ice it or anytime you sit or lie down during the next 3 days. Try to keep it above the level of your heart. This will help reduce swelling. · Your doctor may recommend that you wrap your foot with an elastic bandage. Keep your foot wrapped for as long as your doctor advises. · If your doctor recommends crutches, use them as directed. · Wear roomy footwear. · As soon as pain and swelling end, begin gentle exercises of your foot. Your doctor can tell you which exercises will help. When should you call for help? Call 911 anytime you think you may need emergency care. For example, call if: 
? · Your foot turns pale, white, blue, or cold. ?Call your doctor now or seek immediate medical care if: 
? · You cannot move or stand on your foot. ? · Your foot looks twisted or out of its normal position. ? · Your foot is not stable when you step down. ? · You have signs of infection, such as: 
¨ Increased pain, swelling, warmth, or redness. ¨ Red streaks leading from the sore area. ¨ Pus draining from a place on your foot. ¨ A fever. ? · Your foot is numb or tingly. ? Watch closely for changes in your health, and be sure to contact your doctor if: 
? · You do not get better as expected. ? · You have bruises from an injury that last longer than 2 weeks. Where can you learn more? Go to http://jose martin-karrie.info/. Enter K087 in the search box to learn more about \"Foot Pain: Care Instructions. \" Current as of: March 21, 2017 Content Version: 11.4 © 4987-0238 [employer]. Care instructions adapted under license by [employer] (which disclaims liability or warranty for this information). If you have questions about a medical condition or this instruction, always ask your healthcare professional. [place] [address] any warranty or liability for your use of this information. Introducing [facility]! Dear Elise Page: Thank you for requesting a [employer] account. Our records indicate that you already have an active [employer] account.   You can access your account anytime at [email or web address]. [email or web address]/[email or web address] Did you know that you can access your hospital and ER discharge instructions at any time in [employer]? You can also review all of your test results from your hospital stay or ER visit. Additional Information If you have questions, please visit the Frequently Asked Questions section of the [employer] website at [email or web address] Remember, [employer] is NOT to be used for urgent needs. For medical emergencies, dial 911. Now available from your iPhone and Android! Please provide this summary of care documentation to your next provider. Your primary care clinician is listed as Pee Mccarthy. If you have any questions after today's visit, please call [phone].

## 2018-07-10 ENCOUNTER — OFFICE VISIT (OUTPATIENT)
Dept: ORTHOPEDIC SURGERY | Age: 48
End: 2018-07-10

## 2018-07-10 VITALS
DIASTOLIC BLOOD PRESSURE: 82 MMHG | HEART RATE: 74 BPM | TEMPERATURE: 97.5 F | RESPIRATION RATE: 16 BRPM | HEIGHT: 62 IN | OXYGEN SATURATION: 96 % | SYSTOLIC BLOOD PRESSURE: 139 MMHG

## 2018-07-10 DIAGNOSIS — Z98.890 POST-OPERATIVE STATE: ICD-10-CM

## 2018-07-10 DIAGNOSIS — Z98.890 S/P BUNIONECTOMY: Primary | ICD-10-CM

## 2018-07-10 DIAGNOSIS — M21.611 BUNION OF RIGHT FOOT: ICD-10-CM

## 2018-07-10 DIAGNOSIS — M79.671 RIGHT FOOT PAIN: ICD-10-CM

## 2018-07-10 DIAGNOSIS — Z98.890 S/P FOOT SURGERY: ICD-10-CM

## 2018-07-10 DIAGNOSIS — M20.41 HAMMERTOE OF SECOND TOE OF RIGHT FOOT: ICD-10-CM

## 2018-07-10 RX ORDER — HYDROCODONE BITARTRATE AND ACETAMINOPHEN 5; 325 MG/1; MG/1
TABLET ORAL
Qty: 42 TAB | Refills: 0 | Status: SHIPPED | OUTPATIENT
Start: 2018-07-10

## 2018-07-10 NOTE — PROGRESS NOTES
AMBULATORY PROGRESS NOTE      Patient: Rosa Rebolledo             MRN: [id number]     SSN: [id number] There is no height or weight on file to calculate BMI. YOB: 1970               AGE: 50 y.o. SEX: female    PCP: Bravo Giraldo MD    IMPRESSION/DIAGNOSIS AND TREATMENT PLAN     DIAGNOSES  1. S/P bunionectomy    2. S/P foot surgery    3. Right foot pain    4. Hammertoe of second toe of right foot    5. Bunion of right foot    6. Post-operative state        Orders Placed This Encounter    AMB SUPPLY ORDER    AMB SUPPLY ORDER    HYDROcodone-acetaminophen (NORCO) 5-325 mg per tablet      Rosa Rebolledo understands her diagnoses and the proposed plan. Plan:    1) Remain PWB in the CAM walker boot. MINIMAL PWB ON HEEL ONLY   2) DME Order: Right Short CAM Walker Boot. 3) RTO - 2 weeks    HPI AND EXAMINATION     Rosa Rebolledo IS A 50 y.o. female who presents to my outpatient office for follow up 26 days s/p Right Lapidus procedure, first tarsometatarsal joint arthrodesis, modified Ma distal soft tissue procedure, Mazin procedure number two proximal interphalangeal joint resection arthroplasty and implant to the right number two toe, extensor digitorum longus lengthening of the number two toe, extensor digitorum brevis tenotomy. At last visit, Dr. Chrissy Jiang provided new dressings and removed a few select surgical stitches. Patient presents today for removal of remaining sutures. Since we saw her last, Ms. Dian Olszewski has experienced aching pain since she had some of her stitches removed at the last visit. Patient notes she went fishing yesterday but she had her foot elevated and had the right foot covered. Currently she denies fever or chills. No calf pain. No CP or SOB. At this time, she has been instructed to place weight on her right heel for balance. Otherwise, patient reports she is doing well.      GEN:  Alert, well developed, well nourished, well appearing 50 y.o. female in no acute distress. PSYCH:  Normal affect, mood, and conduct. alert, oriented x 3 alert, oriented x 3, no dementia  M/S EXAMINATION OF: right lower extremity  DRESSINGS: CDI   DRAINAGE: none  INCISION: Incision looks good, skin well approximated, no dehiscence, nylon sutures in place without disruption. SUTURES REMOVED in the office today  SKIN: mild edema , no erythema, mild ecchymosis, no warmth    TENDERNESS:  Mild tenderness to palpation   NEUROVASCULAR:  grossly intact. Positive distal pulses and capillary refill. Patient reports some tingling sensation  DVT ASSESSMENT:  The calf non tender to palpation. No evidence of DVT seen on physical exam.  ROM: not tested    CHART REVIEW     Past Medical History:   Diagnosis Date    Cough     GERD (gastroesophageal reflux disease)     Nausea after anesthesia     Psychiatric disorder     depression    Sinus drainage      Current Outpatient Prescriptions   Medication Sig    HYDROcodone-acetaminophen (NORCO) 5-325 mg per tablet 1-2 tablets every 4-6 hours prn pain    promethazine (PHENERGAN) 25 mg tablet Take 1 Tab by mouth every six (6) hours as needed for Nausea.  polyethylene glycol (MIRALAX) 17 gram packet Take 1 Packet by mouth daily.  cyclobenzaprine (FLEXERIL) 10 mg tablet TAKE 1 TAB BY MOUTH NIGHTLY AS NEEDED FOR MUSCLE SPASM(S).  fexofenadine-pseudoephedrine (ALLEGRA-D 12 HOUR)  mg Tb12 Take 1 Tab by mouth every twelve (12) hours.  melatonin tab tablet Take 10 mg by mouth nightly. No current facility-administered medications for this visit. No Known Allergies  Past Surgical History:   Procedure Laterality Date    HX CARPAL TUNNEL RELEASE Right     HX  SECTION      HX GYN      hysterectomy    HX GYN      c/s    SPINAL FUSION,ANT,EA ADNL LEVEL      c-spine     Social History     Occupational History    Not on file.      Social History Main Topics    Smoking status: Current Every Day Smoker     Packs/day: 0.50    Smokeless tobacco: Never Used    Alcohol use Yes      Comment: socially    Drug use: Not on file    Sexual activity: Not on file     Family History   Problem Relation Age of Onset    Adopted: Yes    No Known Problems Mother     No Known Problems Father     No Known Problems Sister     No Known Problems Brother        REVIEW OF SYSTEMS : 7/10/2018  ALL BELOW ARE Negative except : SEE HPI       Constitutional: Negative for fever, chills and weight loss. Neg Weigh Loss  Cardiovascular: Negative for chest pain, claudication and leg swelling. SOB, LEWIS   Gastrointestinal: Negative for  pain, N/V/D/C, Blood in stool or urine,dysuria, hematuria,        Incontinence, pelvic pain  Musculoskeletal: see HPI. Neurological: Negative for dizziness and weakness. Negative for headaches,Visual Changes, Confusion, Seizures,   Psychiatric/Behavioral: Negative for depression, memory loss and substance abuse. Extremities:  Negative for  hair changes, rash or skin lesion changes. Hematologic: Negative for Bleeding problems, bruising, pallor or swollen lymph nodes.   Peripheral Vascular: No calf pain, vascular vein tenderness to calf pain              No calf throbbing, posterior knee throbbing pain    DIAGNOSTIC IMAGING     No notes on file    Nadeem Matute PA-C

## 2018-07-10 NOTE — PATIENT INSTRUCTIONS
· Continue Activity modification    · Weight bearing status:  Partial Weight bearing to RLE lower extremity    · No lifting, twisting, squatting, deep bending, driving or strenuous activity.     PLEASE SEEK IMMEDIATE ASSESSMENT BY ER PHYSICIAN IF ANY OF THE FOLLOWING EXIST:     Excessive pain, swelling, redness or odor of or around the surgical area   Temperature over 100.5   Nausea and vomiting lasting longer than 4 hours or if unable to take medications   Any signs of decreased circulation or nerve impairment to extremity: change in color, persistent numbness, tingling, coldness or increase pain   If any calf pain, calf tightness, shortness of breath, chest pain   Any difficulty breathing at rest or with ambulation, any chest tightness/soreness  Severe intractable pain, persistent swelling or drainage, development of a wound, incisional redness, finger/toe swelling or color changes, or CALF PAIN    · Perform ankle pumps with non-surgical/non-injured extremity to help reduce the risk of blood clots    · Please follow up in 2 week

## 2018-07-10 NOTE — MR AVS SNAPSHOT
[address] [place] [address], [address] 200 [facility] 
[phone] Patient: Madison Gillespie MRN: [id number] :1970 Visit Information Date & Time Provider Department Dept. Phone Encounter #  
 7/10/2018 11:15 AM Larissa Weaver [facility] 21  Upcoming Health Maintenance Date Due Pneumococcal 19-64 Medium Risk (1 of 1 - PPSV23) 1987 DTaP/Tdap/Td series (1 - Tdap) 1989 PAP AKA CERVICAL CYTOLOGY 1989 Influenza Age 5 to Adult 2018 Allergies as of 7/10/2018  Review Complete On: 7/10/2018 By: Luis Galdamez PA-C No Known Allergies Current Immunizations  Never Reviewed No immunizations on file. Not reviewed this visit You Were Diagnosed With   
  
 Codes Comments S/P bunionectomy    -  Primary ICD-10-CM: A24.256 ICD-9-CM: V45.89 S/P foot surgery     ICD-10-CM: D42.821 ICD-9-CM: V45.89 Right foot pain     ICD-10-CM: M79.671 ICD-9-CM: 729.5 Hammertoe of second toe of right foot     ICD-10-CM: M20.41 ICD-9-CM: 735.4 Bunion of right foot     ICD-10-CM: M21.611 ICD-9-CM: 727.1 Post-operative state     ICD-10-CM: I88.615 ICD-9-CM: V45.89 Vitals BP Pulse Temp Resp Height(growth percentile) SpO2  
 139/82 74 97.5 °F (36.4 °C) (Oral) 16 5' 2\" (1.575 m) 96% OB Status Smoking Status Hysterectomy Current Every Day Smoker Preferred Pharmacy Pharmacy Name Phone [id number] [address] [facility] [phone] Your Updated Medication List  
  
   
This list is accurate as of 7/10/18 11:39 AM.  Always use your most recent med list. ALLEGRA-D 12 HOUR  mg Tb12 Generic drug:  fexofenadine-pseudoephedrine Take 1 Tab by mouth every twelve (12) hours. cyclobenzaprine 10 mg tablet Commonly known as:  FLEXERIL  
 TAKE 1 TAB BY MOUTH NIGHTLY AS NEEDED FOR MUSCLE SPASM(S). HYDROcodone-acetaminophen 5-325 mg per tablet Commonly known as:  NORCO  
1-2 tablets every 4-6 hours prn pain  
  
 melatonin Tab tablet Take 10 mg by mouth nightly. polyethylene glycol 17 gram packet Commonly known as:  [place] [employer] Take 1 Packet by mouth daily. promethazine 25 mg tablet Commonly known as:  PHENERGAN Take 1 Tab by mouth every six (6) hours as needed for Nausea. Prescriptions Printed Refills HYDROcodone-acetaminophen (NORCO) 5-325 mg per tablet 0 Si-2 tablets every 4-6 hours prn pain  
 Class: Print We Performed the Following AMB SUPPLY ORDER [[id number] Custom] Comments:  
 Right Short CAM walker boot Size 6-7 Patient Instructions · Continue Activity modification · Weight bearing status:  Partial Weight bearing to RLE lower extremity · No lifting, twisting, squatting, deep bending, driving or strenuous activity. PLEASE SEEK IMMEDIATE ASSESSMENT BY ER PHYSICIAN IF ANY OF THE FOLLOWING EXIST:  
 
Excessive pain, swelling, redness or odor of or around the surgical area Temperature over 100.5 Nausea and vomiting lasting longer than 4 hours or if unable to take medications Any signs of decreased circulation or nerve impairment to extremity: change in color, persistent numbness, tingling, coldness or increase pain If any calf pain, calf tightness, shortness of breath, chest pain Any difficulty breathing at rest or with ambulation, any chest tightness/soreness Severe intractable pain, persistent swelling or drainage, development of a wound, incisional redness, finger/toe swelling or color changes, or CALF PAIN 
 
· Perform ankle pumps with non-surgical/non-injured extremity to help reduce the risk of blood clots · Please follow up in 2 week Introducing [facility]! Dear Keith Sine: Thank you for requesting a ADAPTIX account. Our records indicate that you already have an active ADAPTIX account. You can access your account anytime at [email or web address] Did you know that you can access your hospital and ER discharge instructions at any time in ADAPTIX? You can also review all of your test results from your hospital stay or ER visit. Additional Information If you have questions, please visit the Frequently Asked Questions section of the ADAPTIX website at [email or web address] Remember, ADAPTIX is NOT to be used for urgent needs. For medical emergencies, dial 911. Now available from your iPhone and Android! Please provide this summary of care documentation to your next provider. Your primary care clinician is listed as Lisa Cobian. If you have any questions after today's visit, please call [phone].

## 2018-07-13 DIAGNOSIS — M21.611 BUNION, RIGHT: Primary | ICD-10-CM

## 2018-07-24 ENCOUNTER — OFFICE VISIT (OUTPATIENT)
Dept: ORTHOPEDIC SURGERY | Age: 48
End: 2018-07-24

## 2018-07-24 VITALS
OXYGEN SATURATION: 91 % | SYSTOLIC BLOOD PRESSURE: 123 MMHG | RESPIRATION RATE: 16 BRPM | BODY MASS INDEX: 33.93 KG/M2 | HEART RATE: 96 BPM | HEIGHT: 62 IN | TEMPERATURE: 96.3 F | WEIGHT: 184.4 LBS | DIASTOLIC BLOOD PRESSURE: 77 MMHG

## 2018-07-24 DIAGNOSIS — Z98.890 S/P BUNIONECTOMY: ICD-10-CM

## 2018-07-24 DIAGNOSIS — Z98.890 POST-OPERATIVE STATE: ICD-10-CM

## 2018-07-24 DIAGNOSIS — M20.41 HAMMERTOE OF SECOND TOE OF RIGHT FOOT: ICD-10-CM

## 2018-07-24 DIAGNOSIS — M79.671 RIGHT FOOT PAIN: Primary | ICD-10-CM

## 2018-07-24 DIAGNOSIS — M21.611 BUNION, RIGHT FOOT: ICD-10-CM

## 2018-07-24 NOTE — MR AVS SNAPSHOT
[address] [place] [address], [address] 200 [facility] 
[phone] Patient: Princess Baez MRN: [id number] :1970 Visit Information Date & Time Provider Department Dept. Phone Encounter #  
 2018  8:30 AM Davis Pickens [address] [facility] [phone] [id number] Follow-up Instructions Return in about 4 weeks (around 2018) for post surgical evaluation, x-rays 3 views right foot. Upcoming Health Maintenance Date Due Pneumococcal 19-64 Medium Risk (1 of 1 - PPSV23) 1987 DTaP/Tdap/Td series (1 - Tdap) 1989 PAP AKA CERVICAL CYTOLOGY 1989 Influenza Age 5 to Adult 2018 Allergies as of 2018  Review Complete On: 2018 By: Davis Pickens PA-C No Known Allergies Current Immunizations  Never Reviewed No immunizations on file. Not reviewed this visit You Were Diagnosed With   
  
 Codes Comments Right foot pain    -  Primary ICD-10-CM: D87.219 ICD-9-CM: 729.5 Hammertoe of second toe of right foot     ICD-10-CM: M20.41 ICD-9-CM: 735.4 S/P bunionectomy     ICD-10-CM: H23.062 ICD-9-CM: V45.89 Post-operative state     ICD-10-CM: T08.255 ICD-9-CM: V45.89 Bunion, right foot     ICD-10-CM: M21.611 ICD-9-CM: 727.1 Vitals BP Pulse Temp Resp Height(growth percentile) Weight(growth percentile) 123/77 96 96.3 °F (35.7 °C) (Oral) 16 5' 2\" (1.575 m) 184 lb 6.4 oz (83.6 kg) SpO2 BMI OB Status Smoking Status 91% 33.73 kg/m2 Hysterectomy Current Every Day Smoker BMI and BSA Data Body Mass Index Body Surface Area  
 33.73 kg/m 2 1.91 m 2 Preferred Pharmacy Pharmacy Name Phone [id number] [address] [place] [phone] Your Updated Medication List  
  
   
This list is accurate as of 18  9:09 AM.  Always use your most recent med list.  
 ALLEGRA-D 12 HOUR  mg Tb12 Generic drug:  fexofenadine-pseudoephedrine Take 1 Tab by mouth every twelve (12) hours. cyclobenzaprine 10 mg tablet Commonly known as:  FLEXERIL  
TAKE 1 TAB BY MOUTH NIGHTLY AS NEEDED FOR MUSCLE SPASM(S). HYDROcodone-acetaminophen 5-325 mg per tablet Commonly known as:  NORCO  
1-2 tablets every 4-6 hours prn pain  
  
 melatonin Tab tablet Take 10 mg by mouth nightly. polyethylene glycol 17 gram packet Commonly known as:  Alonza Schimke Take 1 Packet by mouth daily. promethazine 25 mg tablet Commonly known as:  PHENERGAN Take 1 Tab by mouth every six (6) hours as needed for Nausea. Follow-up Instructions Return in about 4 weeks (around 8/21/2018) for post surgical evaluation, x-rays 3 views right foot. Patient Instructions · Continue Activity modification · Weight bearing status:  Partial Weight bearing to RLE lower extremity on heel in boot or post op shoe · Wear Bunion splint provided · No lifting, twisting, squatting, deep bending, driving or strenuous activity. PLEASE SEEK IMMEDIATE ASSESSMENT BY ER PHYSICIAN IF ANY OF THE FOLLOWING EXIST:  
 
Excessive pain, swelling, redness or odor of or around the surgical area Temperature over 100.5 Nausea and vomiting lasting longer than 4 hours or if unable to take medications Any signs of decreased circulation or nerve impairment to extremity: change in color, persistent numbness, tingling, coldness or increase pain If any calf pain, calf tightness, shortness of breath, chest pain Any difficulty breathing at rest or with ambulation, any chest tightness/soreness Severe intractable pain, persistent swelling or drainage, development of a wound, incisional redness, finger/toe swelling or color changes, or CALF PAIN 
 
· Perform ankle pumps with non-surgical/non-injured extremity to help reduce the risk of blood clots · Please follow up in 4 week Bunions: Care Instructions Your Care Instructions A bunion is a bump on the outside of the joint at the bottom of your big toe. It can cause pain and swelling in the toe. A bunion forms when bone or tissue around the joint becomes swollen from too much pressure. You also can have a bunionette, or tailor's bunion, which forms on the joint of the little toe. Sometimes, a bunion on the big toe turns the toe in toward the second toe. This is called displacement. It can lead to problems with the other toes. You can get a bunion from having an unusual walking style, having flatfeet, or wearing tight-fitting shoes. You can treat most bunions at home with a few simple steps. If you have a lot of pain, your doctor may inject medicine into the bunion to reduce swelling for a while. If you still have pain, you may need to have surgery. Follow-up care is a key part of your treatment and safety. Be sure to make and go to all appointments, and call your doctor if you are having problems. It's also a good idea to know your test results and keep a list of the medicines you take. How can you care for yourself at home? · Ask your doctor if you can take an over-the-counter pain medicine, such as acetaminophen (Tylenol), ibuprofen (Advil, Motrin), or naproxen (Aleve). Be safe with medicines. Read and follow all instructions on the label. · Wear shoes that have a wide and deep space for the toes. Also, wear shoes that have low or flat heels and good arch supports. Do not wear tight, narrow, or high-heeled shoes. · Try bunion pads, arch supports, toe spacers, or shoe inserts. They can help shift your weight when you walk to take pressure off your big toe. · Put moleskin or another type of cushion on or around the bunion to keep it from rubbing against your shoe. · Put ice or a cold pack on the area for 10 to 20 minutes at a time as needed. Put a thin cloth between the ice and your skin. · Prop up your foot on a pillow when you ice your toe or anytime you sit or lie down. Try to keep it above the level of your heart. This will help reduce swelling. When should you call for help? Call your doctor now or seek immediate medical care if: 
  · You have severe pain.  
  · Your toe is cool or pale or changes color.  
  · You have tingling, weakness, or numbness in the toe.  
 Watch closely for changes in your health, and be sure to contact your doctor if: 
  · Pain and swelling get worse.  
  · You do not get better as expected. Where can you learn more? Go to http://jose martin-karrie.info/. Enter H210 in the search box to learn more about \"Bunions: Care Instructions. \" Current as of: November 29, 2017 Content Version: 11.7 © 3773-6526 Ifinity. Care instructions adapted under license by [employer] (which disclaims liability or warranty for this information). If you have questions about a medical condition or this instruction, always ask your healthcare professional. [place] [address] any warranty or liability for your use of this information. Introducing [facility]! Dear Bradley Hines: Thank you for requesting a Appsfire account. Our records indicate that you have previously registered for a Appsfire account but its currently inactive. Please call our Appsfire support line at [phone]. Additional Information If you have questions, please visit the Frequently Asked Questions section of the Appsfire website at [email or web address] Remember, Appsfire is NOT to be used for urgent needs. For medical emergencies, dial 911. Now available from your iPhone and Android! Please provide this summary of care documentation to your next provider. Your primary care clinician is listed as Chidi Dixon. If you have any questions after today's visit, please call [phone].

## 2018-07-24 NOTE — PATIENT INSTRUCTIONS
· Continue Activity modification    · Weight bearing status:  Partial Weight bearing to RLE lower extremity on heel in boot or post op shoe    · Wear Bunion splint provided    · No lifting, twisting, squatting, deep bending, driving or strenuous activity. PLEASE SEEK IMMEDIATE ASSESSMENT BY ER PHYSICIAN IF ANY OF THE FOLLOWING EXIST:     Excessive pain, swelling, redness or odor of or around the surgical area   Temperature over 100.5   Nausea and vomiting lasting longer than 4 hours or if unable to take medications   Any signs of decreased circulation or nerve impairment to extremity: change in color, persistent numbness, tingling, coldness or increase pain   If any calf pain, calf tightness, shortness of breath, chest pain   Any difficulty breathing at rest or with ambulation, any chest tightness/soreness  Severe intractable pain, persistent swelling or drainage, development of a wound, incisional redness, finger/toe swelling or color changes, or CALF PAIN    · Perform ankle pumps with non-surgical/non-injured extremity to help reduce the risk of blood clots    · Please follow up in 4 week          Bunions: Care Instructions  Your Care Instructions    A bunion is a bump on the outside of the joint at the bottom of your big toe. It can cause pain and swelling in the toe. A bunion forms when bone or tissue around the joint becomes swollen from too much pressure. You also can have a bunionette, or tailor's bunion, which forms on the joint of the little toe. Sometimes, a bunion on the big toe turns the toe in toward the second toe. This is called displacement. It can lead to problems with the other toes. You can get a bunion from having an unusual walking style, having flatfeet, or wearing tight-fitting shoes. You can treat most bunions at home with a few simple steps. If you have a lot of pain, your doctor may inject medicine into the bunion to reduce swelling for a while.  If you still have pain, you may need to have surgery. Follow-up care is a key part of your treatment and safety. Be sure to make and go to all appointments, and call your doctor if you are having problems. It's also a good idea to know your test results and keep a list of the medicines you take. How can you care for yourself at home? · Ask your doctor if you can take an over-the-counter pain medicine, such as acetaminophen (Tylenol), ibuprofen (Advil, Motrin), or naproxen (Aleve). Be safe with medicines. Read and follow all instructions on the label. · Wear shoes that have a wide and deep space for the toes. Also, wear shoes that have low or flat heels and good arch supports. Do not wear tight, narrow, or high-heeled shoes. · Try bunion pads, arch supports, toe spacers, or shoe inserts. They can help shift your weight when you walk to take pressure off your big toe. · Put moleskin or another type of cushion on or around the bunion to keep it from rubbing against your shoe. · Put ice or a cold pack on the area for 10 to 20 minutes at a time as needed. Put a thin cloth between the ice and your skin. · Prop up your foot on a pillow when you ice your toe or anytime you sit or lie down. Try to keep it above the level of your heart. This will help reduce swelling. When should you call for help? Call your doctor now or seek immediate medical care if:    · You have severe pain.     · Your toe is cool or pale or changes color.     · You have tingling, weakness, or numbness in the toe.    Watch closely for changes in your health, and be sure to contact your doctor if:    · Pain and swelling get worse.     · You do not get better as expected. Where can you learn more? Go to http://jose martin-karrie.info/. Enter H210 in the search box to learn more about \"Bunions: Care Instructions. \"  Current as of: November 29, 2017  Content Version: 11.7  © 1454-0885 [employer]Dox, [employer].  Care instructions adapted under license by [employer]ctions (which disclaims liability or warranty for this information). If you have questions about a medical condition or this instruction, always ask your healthcare professional. [address] any warranty or liability for your use of this information.

## 2018-07-24 NOTE — PROGRESS NOTES
AMBULATORY PROGRESS NOTE      Patient: Jerry Castellanos             MRN: [id number]     SSN: [id number] Body mass index is 33.73 kg/(m^2). YOB: 1970               AGE: 50 y.o. SEX: female    PCP: Wilber Frankel, MD    DOS: 6/14/18    IMPRESSION/DIAGNOSIS AND TREATMENT PLAN     DIAGNOSES  1. Right foot pain    2. Hammertoe of second toe of right foot    3. S/P bunionectomy    4. Post-operative state    5. Bunion, right foot        No orders of the defined types were placed in this encounter. Jerry Castellanos understands her diagnoses and the proposed plan. Plan:      · Continue Activity modification    · Weight bearing status:  Partial Weight bearing to RLE lower extremity on heel in boot or post op shoe    · Wear Bunion splint provided    · No lifting, twisting, squatting, deep bending, driving or strenuous activity.     PLEASE SEEK IMMEDIATE ASSESSMENT BY ER PHYSICIAN IF ANY OF THE FOLLOWING EXIST:     Excessive pain, swelling, redness or odor of or around the surgical area   Temperature over 100.5   Nausea and vomiting lasting longer than 4 hours or if unable to take medications   Any signs of decreased circulation or nerve impairment to extremity: change in color, persistent numbness, tingling, coldness or increase pain   If any calf pain, calf tightness, shortness of breath, chest pain   Any difficulty breathing at rest or with ambulation, any chest tightness/soreness  Severe intractable pain, persistent swelling or drainage, development of a wound, incisional redness, finger/toe swelling or color changes, or CALF PAIN    · Perform ankle pumps with non-surgical/non-injured extremity to help reduce the risk of blood clots    · Please follow up in 4 weeks - X-rays of the right foot at [address] 50 y.o. female who presents to my outpatient office for follow up 40 days s/p Right Lapidus procedure, first tarsometatarsal joint arthrodesis, modified Ma distal soft tissue procedure, Mazin procedure number two proximal interphalangeal joint resection arthroplasty and implant to the right number two toe, extensor digitorum longus lengthening of the number two toe, extensor digitorum brevis tenotomy. Since we saw her last, Ms. Carmen Ochoa has experienced less pain and is no longer taking narcotic pain medicine as she states it was disrupting her sleep. She has some decreased sensation on her great toe with normal sensation to the rest of her foot. She plans to go on vacation to the beach and has been advised to wear the CAM boot and to not submerge her foot in any body of water - showering is fine. Currently she denies fever or chills. No calf pain. No CP or SOB. She reports what sounds like dependent rubor when her foot is down to gravity. None noted during her office visit today. GEN:  Alert, well developed, well nourished, well appearing 50 y.o. female in no acute distress. PSYCH:  Normal affect, mood, and conduct. alert, oriented x 3 alert, oriented x 3, no dementia  M/S EXAMINATION OF: right lower extremity  INCISION: Incision looks good, skin healing well, scabs present, no dehiscence  SKIN: mild edema , no erythema, mild ecchymosis, no warmth    TENDERNESS:  Mild tenderness to palpation   NEUROVASCULAR:  grossly intact. Positive distal pulses and capillary refill. Patient reports some numbness to the great toe  DVT ASSESSMENT:  The calf non tender to palpation.  No evidence of DVT seen on physical exam.  ROM: not tested    CHART REVIEW     Past Medical History:   Diagnosis Date    Cough     GERD (gastroesophageal reflux disease)     Nausea after anesthesia     Psychiatric disorder     depression    Sinus drainage      Current Outpatient Prescriptions   Medication Sig    HYDROcodone-acetaminophen (NORCO) 5-325 mg per tablet 1-2 tablets every 4-6 hours prn pain    promethazine (PHENERGAN) 25 mg tablet Take 1 Tab by mouth every six (6) hours as needed for Nausea.  cyclobenzaprine (FLEXERIL) 10 mg tablet TAKE 1 TAB BY MOUTH NIGHTLY AS NEEDED FOR MUSCLE SPASM(S).  fexofenadine-pseudoephedrine (ALLEGRA-D 12 HOUR)  mg Tb12 Take 1 Tab by mouth every twelve (12) hours.  melatonin tab tablet Take 10 mg by mouth nightly.  polyethylene glycol (MIRALAX) 17 gram packet Take 1 Packet by mouth daily. No current facility-administered medications for this visit. No Known Allergies  Past Surgical History:   Procedure Laterality Date    HX CARPAL TUNNEL RELEASE Right     HX  SECTION      HX GYN      hysterectomy    HX GYN      c/s    SPINAL FUSION,ANT,EA ADNL LEVEL      c-spine     Social History     Occupational History    Not on file. Social History Main Topics    Smoking status: Current Every Day Smoker     Packs/day: 0.50    Smokeless tobacco: Never Used    Alcohol use Yes      Comment: socially    Drug use: Not on file    Sexual activity: Not on file     Family History   Problem Relation Age of Onset    Adopted: Yes    No Known Problems Mother     No Known Problems Father     No Known Problems Sister     No Known Problems Brother        REVIEW OF SYSTEMS : 2018  ALL BELOW ARE Negative except : SEE HPI       Constitutional: Negative for fever, chills and weight loss. Neg Weigh Loss  Cardiovascular: Negative for chest pain, claudication and leg swelling. SOB, LEWIS   Gastrointestinal: Negative for  pain, N/V/D/C, Blood in stool or urine,dysuria, hematuria,        Incontinence, pelvic pain  Musculoskeletal: see HPI. Neurological: Negative for dizziness and weakness. Negative for headaches,Visual Changes, Confusion, Seizures,   Psychiatric/Behavioral: Negative for depression, memory loss and substance abuse. Extremities:  Negative for  hair changes, rash or skin lesion changes.   Hematologic: Negative for Bleeding problems, bruising, pallor or swollen lymph nodes.   Peripheral Vascular: No calf pain, vascular vein tenderness to calf pain              No calf throbbing, posterior knee throbbing pain    DIAGNOSTIC IMAGING     No notes on file    Ramiro Grove PA-C

## 2018-08-21 ENCOUNTER — OFFICE VISIT (OUTPATIENT)
Dept: ORTHOPEDIC SURGERY | Age: 48
End: 2018-08-21

## 2018-08-21 VITALS
HEIGHT: 62 IN | OXYGEN SATURATION: 97 % | WEIGHT: 180 LBS | BODY MASS INDEX: 33.13 KG/M2 | HEART RATE: 80 BPM | SYSTOLIC BLOOD PRESSURE: 126 MMHG | DIASTOLIC BLOOD PRESSURE: 80 MMHG | TEMPERATURE: 97.3 F

## 2018-08-21 DIAGNOSIS — M79.671 RIGHT FOOT PAIN: Primary | ICD-10-CM

## 2018-08-21 NOTE — MR AVS SNAPSHOT
[address] [place] [address], [address] [place] [address] 
[phone] Patient: Beverly Santana MRN: [id number] :1970 Visit Information Date & Time Provider Department Dept. Phone Encounter #  
 2018  8:45 AM Ed Tam, [address] [facility] [phone] Follow-up Instructions Return in about 4 weeks (around 2018) for follow up evaluation. Upcoming Health Maintenance Date Due Pneumococcal 19-64 Medium Risk (1 of 1 - PPSV23) 1987 DTaP/Tdap/Td series (1 - Tdap) 1989 PAP AKA CERVICAL CYTOLOGY 1989 Influenza Age 5 to Adult 2018 Allergies as of 2018  Review Complete On: 2018 By: Ed Tam PA-C No Known Allergies Current Immunizations  Never Reviewed No immunizations on file. Not reviewed this visit You Were Diagnosed With   
  
 Codes Comments Right foot pain    -  Primary ICD-10-CM: E11.525 ICD-9-CM: 729.5 Vitals BP Pulse Temp Height(growth percentile) Weight(growth percentile) SpO2  
 126/80 80 97.3 °F (36.3 °C) 5' 2\" (1.575 m) 180 lb (81.6 kg) 97% BMI OB Status Smoking Status 32.92 kg/m2 Hysterectomy Current Every Day Smoker BMI and BSA Data Body Mass Index Body Surface Area  
 32.92 kg/m 2 1.89 m 2 Preferred Pharmacy Pharmacy Name Phone [id number] Cassandraraffi GlaserShiraz, [facility],[address] Floor [facility] [phone] Your Updated Medication List  
  
   
This list is accurate as of 18 10:00 AM.  Always use your most recent med list. ALLEGRA-D 12 HOUR  mg Tb12 Generic drug:  fexofenadine-pseudoephedrine Take 1 Tab by mouth every twelve (12) hours. cyclobenzaprine 10 mg tablet Commonly known as:  FLEXERIL  
TAKE 1 TAB BY MOUTH NIGHTLY AS NEEDED FOR MUSCLE SPASM(S). HYDROcodone-acetaminophen 5-325 mg per tablet Commonly known as:  NORCO  
1-2 tablets every 4-6 hours prn pain  
  
 melatonin Tab tablet Take 10 mg by mouth nightly. polyethylene glycol 17 gram packet Commonly known as:  Sarah Kehr Take 1 Packet by mouth daily. promethazine 25 mg tablet Commonly known as:  PHENERGAN Take 1 Tab by mouth every six (6) hours as needed for Nausea. We Performed the Following AMB POC XRAY, FOOT; COMPLETE, 3+ VIEW [36535 CPT(R)] Follow-up Instructions Return in about 4 weeks (around 9/18/2018) for follow up evaluation. Patient Instructions · Continue Activity modification · Weight bearing status:  As tolerated in supportive shoe/sneaker (New Balance) · Wear Tubigrip provided · No lifting, twisting, squatting, deep bending, driving or strenuous activity. 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 She should be allowed to wear her boot as needed · Please follow up in 4 weeks Rehab Exercises Your Care Instructions Here are some examples of typical rehabilitation exercises for your condition. Start each exercise slowly. Ease off the exercise if you start to have pain. Your doctor or physical therapist will tell you when you can start these exercises and which ones will work best for you. How to do the exercises Passive toe exercise 1. Sit on the floor, with the heel of your affected foot on the floor. Use one hand to hold your foot steady. 2. Using the thumb and index (pointing) finger of your other hand, slowly bend your toe forward and then backward. Hold each position for about 15 seconds. 3. Repeat 2 to 4 times. Toe curl 1. Sit on the floor, with the heel of your affected foot on the floor. 2. Gently curl your toes forward and then backward. Hold each position for about 6 seconds. 3. Repeat 8 to 12 times. Towel scrunches 1. Sit in a chair, and place your affected foot on a towel on the floor. 2. Scrunch the towel toward you with your toes. Then use your toes to push the towel back into place. 3. Repeat 8 to 12 times. Introducing [facility] & HEALTH SERVICES! Dear Sharri Hogan: Thank you for requesting a [employer] account. Our records indicate that you have previously registered for a [employer] account but its currently inactive. Please call our [employer] support line at [phone]. Additional Information If you have questions, please visit the Frequently Asked Questions section of the [employer] website at [email or web address] Remember, [employer] is NOT to be used for urgent needs. For medical emergencies, dial 911. Now available from your iPhone and Android! Please provide this summary of care documentation to your next provider. Your primary care clinician is listed as Kelley Wiseman. If you have any questions after today's visit, please call [phone].

## 2018-08-21 NOTE — LETTER
NOTIFICATION RETURN TO WORK / SCHOOL 
 
8/21/2018 9:56 AM 
 
Ms. Sheila Ramirez [address] [id number] To Whom It May Concern: Sheila Ramirez is currently under the care of [address] [place] [address] Miquel Franks. Please excuse patient from lunch line, bus duty, and please allow her to wear her boot. If there are questions or concerns please have the patient contact our office. Sincerely, [address] Jay Johnson PA-C

## 2018-08-21 NOTE — PATIENT INSTRUCTIONS
· Continue Activity modification    · Weight bearing status:  As tolerated in supportive shoe/sneaker (New Balance)    · Wear Tubigrip provided    · No lifting, twisting, squatting, deep bending, driving or strenuous activity. 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 She should be allowed to wear her boot as needed    · Please follow up in 4 weeks          Rehab Exercises  Your Care Instructions  Here are some examples of typical rehabilitation exercises for your condition. Start each exercise slowly. Ease off the exercise if you start to have pain. Your doctor or physical therapist will tell you when you can start these exercises and which ones will work best for you. How to do the exercises  Passive toe exercise    1. Sit on the floor, with the heel of your affected foot on the floor. Use one hand to hold your foot steady. 2. Using the thumb and index (pointing) finger of your other hand, slowly bend your toe forward and then backward. Hold each position for about 15 seconds. 3. Repeat 2 to 4 times. Toe curl    1. Sit on the floor, with the heel of your affected foot on the floor. 2. Gently curl your toes forward and then backward. Hold each position for about 6 seconds. 3. Repeat 8 to 12 times. Towel scrunches    1. Sit in a chair, and place your affected foot on a towel on the floor. 2. Scrunch the towel toward you with your toes. Then use your toes to push the towel back into place. 3. Repeat 8 to 12 times.

## 2018-08-21 NOTE — PROGRESS NOTES
AMBULATORY PROGRESS NOTE      Patient: Hyacinth Oshea             MRN: [id number]     SSN: [id number] Body mass index is 32.92 kg/(m^2). YOB: 1970               AGE: 50 y.o. SEX: female    PCP: Domenica Irvin MD    DOS: 6/14/18    IMPRESSION/DIAGNOSIS AND TREATMENT PLAN     DIAGNOSES  1. Right foot pain        Orders Placed This Encounter    [75312] Foot Min 3V      Hyacinth Oshea understands her diagnoses and the proposed plan. Plan:      · Continue Activity modification    · Weight bearing status:  As tolerated in supportive shoe/sneaker (New Balance)    · Wear Tubigrip provided    · No lifting, twisting, squatting, deep bending, driving or strenuous activity. 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 She should be allowed to wear her boot as needed    · Please follow up in 4 weeks        Rehab Exercises  Your Care Instructions  Here are some examples of typical rehabilitation exercises for your condition. Start each exercise slowly. Ease off the exercise if you start to have pain. Your doctor or physical therapist will tell you when you can start these exercises and which ones will work best for you. How to do the exercises  Passive toe exercise    1. Sit on the floor, with the heel of your affected foot on the floor. Use one hand to hold your foot steady. 2. Using the thumb and index (pointing) finger of your other hand, slowly bend your toe forward and then backward. Hold each position for about 15 seconds. 3. Repeat 2 to 4 times. Toe curl    1. Sit on the floor, with the heel of your affected foot on the floor. 2. Gently curl your toes forward and then backward. Hold each position for about 6 seconds. 3. Repeat 8 to 12 times. Towel scrunches    1.  Sit in a chair, and place your affected foot on a towel on the floor.  2. Scrunch the towel toward you with your toes. Then use your toes to push the towel back into place. 3. Repeat 8 to 12 times. HPI AND EXAMINATION     Laylaurie Simpson IS A 50 y.o. female who presents to my outpatient office for follow up 68 days s/p Right Lapidus procedure, first tarsometatarsal joint arthrodesis, modified Ma distal soft tissue procedure, Mazin procedure number two proximal interphalangeal joint resection arthroplasty and implant to the right number two toe, extensor digitorum longus lengthening of the number two toe, extensor digitorum brevis tenotomy. Since we saw her last, Ms. Graciela Schulte states that she is feeling better with less pain. She has some swelling and stiffness to the great toe and #2 toe and decreased sensation on her great toe with normal sensation to the rest of her foot. Currently she denies fever or chills. No calf pain. No CP or SOB. GEN:  Alert, well developed, well nourished, well appearing 50 y.o. female in no acute distress. PSYCH:  Normal affect, mood, and conduct. alert, oriented x 3 alert, oriented x 3, no dementia  M/S EXAMINATION OF: right lower extremity  INCISION: Incision looks good, skin healing well, scabs present, no dehiscence  SKIN: mild edema (great toe and #2 toe causing crowding), no erythema, mild ecchymosis, no warmth    TENDERNESS:  Mild tenderness to palpation   NEUROVASCULAR:  grossly intact. Positive distal pulses and capillary refill. Patient reports some numbness to the great toe  DVT ASSESSMENT:  The calf non tender to palpation. No evidence of DVT seen on physical exam.  ROM: limited.  Stiffness noted to great toe and second toe (primarly at PIP)    CHART REVIEW     Past Medical History:   Diagnosis Date    Cough     GERD (gastroesophageal reflux disease)     Nausea after anesthesia     Psychiatric disorder     depression    Sinus drainage      Current Outpatient Prescriptions   Medication Sig    fexofenadine-pseudoephedrine (ALLEGRA-D 12 HOUR)  mg Tb12 Take 1 Tab by mouth every twelve (12) hours.  HYDROcodone-acetaminophen (NORCO) 5-325 mg per tablet 1-2 tablets every 4-6 hours prn pain    promethazine (PHENERGAN) 25 mg tablet Take 1 Tab by mouth every six (6) hours as needed for Nausea.  polyethylene glycol (MIRALAX) 17 gram packet Take 1 Packet by mouth daily.  cyclobenzaprine (FLEXERIL) 10 mg tablet TAKE 1 TAB BY MOUTH NIGHTLY AS NEEDED FOR MUSCLE SPASM(S).  melatonin tab tablet Take 10 mg by mouth nightly. No current facility-administered medications for this visit. No Known Allergies  Past Surgical History:   Procedure Laterality Date    FOOT/TOES SURGERY PROC UNLISTED      HX CARPAL TUNNEL RELEASE Right     HX  SECTION      HX GYN      hysterectomy    HX GYN      c/s    SPINAL FUSION,ANT,EA ADNL LEVEL      c-spine     Social History     Occupational History    Not on file. Social History Main Topics    Smoking status: Current Every Day Smoker     Packs/day: 0.50    Smokeless tobacco: Never Used    Alcohol use Yes      Comment: socially    Drug use: Not on file    Sexual activity: Not on file     Family History   Problem Relation Age of Onset    Adopted: Yes    No Known Problems Mother     No Known Problems Father     No Known Problems Sister     No Known Problems Brother        REVIEW OF SYSTEMS : 2018  ALL BELOW ARE Negative except : SEE HPI       Constitutional: Negative for fever, chills and weight loss. Neg Weigh Loss  Cardiovascular: Negative for chest pain, claudication and leg swelling. SOB, LEWIS   Gastrointestinal: Negative for  pain, N/V/D/C, Blood in stool or urine,dysuria, hematuria,        Incontinence, pelvic pain  Musculoskeletal: see HPI. Neurological: Negative for dizziness and weakness.                  Negative for headaches,Visual Changes, Confusion, Seizures,   Psychiatric/Behavioral: Negative for depression, memory loss and substance abuse. Extremities:  Negative for  hair changes, rash or skin lesion changes. Hematologic: Negative for Bleeding problems, bruising, pallor or swollen lymph nodes.   Peripheral Vascular: No calf pain, vascular vein tenderness to calf pain              No calf throbbing, posterior knee throbbing pain    DIAGNOSTIC IMAGING     No notes on file    Gurinder Astudillo PA-C

## 2018-09-18 ENCOUNTER — OFFICE VISIT (OUTPATIENT)
Dept: ORTHOPEDIC SURGERY | Age: 48
End: 2018-09-18

## 2018-09-18 VITALS
WEIGHT: 179.4 LBS | HEART RATE: 99 BPM | SYSTOLIC BLOOD PRESSURE: 124 MMHG | DIASTOLIC BLOOD PRESSURE: 78 MMHG | BODY MASS INDEX: 33.01 KG/M2 | HEIGHT: 62 IN | OXYGEN SATURATION: 95 %

## 2018-09-18 DIAGNOSIS — M20.41 HAMMERTOE OF SECOND TOE OF RIGHT FOOT: ICD-10-CM

## 2018-09-18 DIAGNOSIS — Z98.890 POST-OPERATIVE STATE: ICD-10-CM

## 2018-09-18 DIAGNOSIS — Z98.890 S/P BUNIONECTOMY: ICD-10-CM

## 2018-09-18 DIAGNOSIS — M79.671 RIGHT FOOT PAIN: Primary | ICD-10-CM

## 2018-09-18 NOTE — MR AVS SNAPSHOT
[address] [place] [address], [address] [facility] 
[phone] Patient: Kasia Baumann MRN: [id number] :1970 Visit Information Date & Time Provider Department Dept. Phone Encounter #  
 2018  3:15  Jay Johnson, [address] [facility] [phone] [id number] Follow-up Instructions Return in about 6 weeks (around 10/30/2018) for follow up evaluation. Upcoming Health Maintenance Date Due Pneumococcal 19-64 Medium Risk (1 of 1 - PPSV23) 1987 DTaP/Tdap/Td series (1 - Tdap) 1989 PAP AKA CERVICAL CYTOLOGY 1989 Influenza Age 5 to Adult 2018 Allergies as of 2018  Review Complete On: 2018 By: Libby Grimaldo LPN No Known Allergies Current Immunizations  Never Reviewed No immunizations on file. Not reviewed this visit You Were Diagnosed With   
  
 Codes Comments Right foot pain    -  Primary ICD-10-CM: J41.324 ICD-9-CM: 729.5 Hammertoe of second toe of right foot     ICD-10-CM: M20.41 ICD-9-CM: 735.4 S/P bunionectomy     ICD-10-CM: U59.046 ICD-9-CM: V45.89 Post-operative state     ICD-10-CM: M55.774 ICD-9-CM: V45.89 Vitals BP Pulse Height(growth percentile) Weight(growth percentile) SpO2 BMI  
 124/78 99 5' 2\" (1.575 m) 179 lb 6.4 oz (81.4 kg) 95% 32.81 kg/m2 OB Status Smoking Status Hysterectomy Current Every Day Smoker BMI and BSA Data Body Mass Index Body Surface Area  
 32.81 kg/m 2 1.89 m 2 Preferred Pharmacy Pharmacy Name Phone [id number] Shila Milvia, [address] [facility] [phone] Your Updated Medication List  
  
   
This list is accurate as of 18  3:26 PM.  Always use your most recent med list. ALLEGRA-D 12 HOUR  mg Tb12 Generic drug:  fexofenadine-pseudoephedrine Take 1 Tab by mouth every twelve (12) hours. cyclobenzaprine 10 mg tablet Commonly known as:  FLEXERIL  
TAKE 1 TAB BY MOUTH NIGHTLY AS NEEDED FOR MUSCLE SPASM(S). HYDROcodone-acetaminophen 5-325 mg per tablet Commonly known as:  NORCO  
1-2 tablets every 4-6 hours prn pain  
  
 melatonin Tab tablet Take 10 mg by mouth nightly. polyethylene glycol 17 gram packet Commonly known as:  Duane [place] Take 1 Packet by mouth daily. promethazine 25 mg tablet Commonly known as:  PHENERGAN Take 1 Tab by mouth every six (6) hours as needed for Nausea. Follow-up Instructions Return in about 6 weeks (around 10/30/2018) for follow up evaluation. Patient Instructions · Progress activity as tolerated · Wear supportive shoe · Weight bearing status:  As tolerated in supportive shoe/sneaker (New Balance) · Continue toe stretching exercises as instructed · Continue current work restrictions · Please follow up in 6 weeks Rehab Exercises Your Care Instructions Here are some examples of typical rehabilitation exercises for your condition. Start each exercise slowly. Ease off the exercise if you start to have pain. Your doctor or physical therapist will tell you when you can start these exercises and which ones will work best for you. How to do the exercises Passive toe exercise 1. Sit on the floor, with the heel of your affected foot on the floor. Use one hand to hold your foot steady. 2. Using the thumb and index (pointing) finger of your other hand, slowly bend your toe forward and then backward. Hold each position for about 15 seconds. 3. Repeat 2 to 4 times. Toe curl 1. Sit on the floor, with the heel of your affected foot on the floor. 2. Gently curl your toes forward and then backward. Hold each position for about 6 seconds. 3. Repeat 8 to 12 times. Towel scrunches 1. Sit in a chair, and place your affected foot on a towel on the floor. 2. Scrunch the towel toward you with your toes. Then use your toes to push the towel back into place. 3. Repeat 8 to 12 times. Introducing [facility] & HE[facility] SERVICES! Dear Anne Marie Hong: Thank you for requesting a [employer] account. Our records indicate that you already have an active [employer] account. You can access your account anytime at [email or web address] Did you know that you can access your hospital and ER discharge instructions at any time in [employer]? You can also review all of your test results from your hospital stay or ER visit. Additional Information If you have questions, please visit the Frequently Asked Questions section of the [employer] website at [email or web address] Remember, [employer] is NOT to be used for urgent needs. For medical emergencies, dial 911. Now available from your iPhone and Android! Please provide this summary of care documentation to your next provider. Your primary care clinician is listed as Rashmi Franco. If you have any questions after today's visit, please call [phone].

## 2018-09-18 NOTE — PROGRESS NOTES
AMBULATORY PROGRESS NOTE      Patient: Maida Sawyer             MRN: [id number]     SSN: [id number] Body mass index is 32.81 kg/(m^2). YOB: 1970               AGE: 50 y.o. SEX: female    PCP: Jamil Martinez MD    DOS: 6/14/18    IMPRESSION/DIAGNOSIS AND TREATMENT PLAN     DIAGNOSES  1. Right foot pain    2. Hammertoe of second toe of right foot    3. S/P bunionectomy    4. Post-operative state        No orders of the defined types were placed in this encounter. Maida Sawyer understands her diagnoses and the proposed plan. 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 She should be allowed to wear her boot as needed    · Please follow up in 6 weeks - X-rays of the right foot at [address] Instructions  Here are some examples of typical rehabilitation exercises for your condition. Start each exercise slowly. Ease off the exercise if you start to have pain. Your doctor or physical therapist will tell you when you can start these exercises and which ones will work best for you. How to do the exercises  Passive toe exercise    1. Sit on the floor, with the heel of your affected foot on the floor. Use one hand to hold your foot steady. 2. Using the thumb and index (pointing) finger of your other hand, slowly bend your toe forward and then backward. Hold each position for about 15 seconds. 3. Repeat 2 to 4 times. Toe curl    1. Sit on the floor, with the heel of your affected foot on the floor. 2. Gently curl your toes forward and then backward. Hold each position for about 6 seconds. 3. Repeat 8 to 12 times. Towel scrunches    1. Sit in a chair, and place your affected foot on a towel on the floor.   2. Scrunch the towel toward you with your toes. Then use your toes to push the towel back into place. 3. Repeat 8 to 12 times. HPI AND EXAMINATION     Yani Kwok is a 50 y.o. female who presents to my outpatient office for follow up 68 days s/p Right Lapidus procedure, first tarsometatarsal joint arthrodesis, modified Ma distal soft tissue procedure, Mazin procedure number two proximal interphalangeal joint resection arthroplasty and implant to the right number two toe, extensor digitorum longus lengthening of the number two toe, extensor digitorum brevis tenotomy. Since we saw her last, Ms. Fernie Romero states that she is feeling better with less pain. She has been performing toe exercises as instructed and reports less swelling and stiffness to the great toe and #2 toe. She states that overall she is much better, but she does have days when she has increased pain. She states that the silicone toe spacer has helped and she has less crowding. Currently she denies fever or chills. No calf pain. No CP or SOB. GEN:  Alert, well developed, well nourished, well appearing 50 y.o. female in no acute distress. PSYCH:  Normal affect, mood, and conduct. alert, oriented x 3 alert, oriented x 3, no dementia  M/S EXAMINATION OF: right lower extremity  INCISION: Incision looks good, skin healing well, scabs present, no dehiscence  SKIN: less edema and crowding of great toe and #2 toe, no erythema, mild ecchymosis, no warmth    TENDERNESS:  Mild tenderness to palpation   NEUROVASCULAR:  grossly intact. Positive distal pulses and capillary refill. Patient reports some numbness to the great toe  DVT ASSESSMENT:  The calf non tender to palpation.  No evidence of DVT seen on physical exam.  ROM: Improved    CHART REVIEW     Past Medical History:   Diagnosis Date    Cough     GERD (gastroesophageal reflux disease)     Nausea after anesthesia     Psychiatric disorder     depression    Sinus drainage      Current Outpatient Prescriptions Medication Sig    cyclobenzaprine (FLEXERIL) 10 mg tablet TAKE 1 TAB BY MOUTH NIGHTLY AS NEEDED FOR MUSCLE SPASM(S).  fexofenadine-pseudoephedrine (ALLEGRA-D 12 HOUR)  mg Tb12 Take 1 Tab by mouth every twelve (12) hours.  melatonin tab tablet Take 10 mg by mouth nightly.  HYDROcodone-acetaminophen (NORCO) 5-325 mg per tablet 1-2 tablets every 4-6 hours prn pain    promethazine (PHENERGAN) 25 mg tablet Take 1 Tab by mouth every six (6) hours as needed for Nausea.  polyethylene glycol (MIRALAX) 17 gram packet Take 1 Packet by mouth daily. No current facility-administered medications for this visit. No Known Allergies  Past Surgical History:   Procedure Laterality Date    FOOT/TOES SURGERY PROC UNLISTED      HX CARPAL TUNNEL RELEASE Right     HX  SECTION      HX GYN      hysterectomy    HX GYN      c/s    SPINAL FUSION,ANT,EA ADNL LEVEL      c-spine     Social History     Occupational History    Not on file. Social History Main Topics    Smoking status: Current Every Day Smoker     Packs/day: 0.50    Smokeless tobacco: Never Used    Alcohol use Yes      Comment: socially    Drug use: Not on file    Sexual activity: Not on file     Family History   Problem Relation Age of Onset    Adopted: Yes    No Known Problems Mother     No Known Problems Father     No Known Problems Sister     No Known Problems Brother        REVIEW OF SYSTEMS : 2018  ALL BELOW ARE Negative except : SEE HPI       Constitutional: Negative for fever, chills and weight loss. Neg Weigh Loss  Cardiovascular: Negative for chest pain, claudication and leg swelling. SOB, LEWIS   Gastrointestinal: Negative for  pain, N/V/D/C, Blood in stool or urine,dysuria, hematuria,        Incontinence, pelvic pain  Musculoskeletal: see HPI. Neurological: Negative for dizziness and weakness.                  Negative for headaches,Visual Changes, Confusion, Seizures,   Psychiatric/Behavioral: Negative for depression, memory loss and substance abuse. Extremities:  Negative for  hair changes, rash or skin lesion changes. Hematologic: Negative for Bleeding problems, bruising, pallor or swollen lymph nodes.   Peripheral Vascular: No calf pain, vascular vein tenderness to calf pain              No calf throbbing, posterior knee throbbing pain    DIAGNOSTIC IMAGING     No notes on file    Huong Morales PA-C

## 2018-09-18 NOTE — PATIENT INSTRUCTIONS
· Progress activity as tolerated    · Wear supportive shoe     · Weight bearing status:  As tolerated in supportive shoe/sneaker ([employer])    · Continue toe stretching exercises as instructed    · Continue current work restrictions    · Please follow up in 6 weeks          Rehab Exercises  Your Care Instructions  Here are some examples of typical rehabilitation exercises for your condition. Start each exercise slowly. Ease off the exercise if you start to have pain. Your doctor or physical therapist will tell you when you can start these exercises and which ones will work best for you. How to do the exercises  Passive toe exercise    1. Sit on the floor, with the heel of your affected foot on the floor. Use one hand to hold your foot steady. 2. Using the thumb and index (pointing) finger of your other hand, slowly bend your toe forward and then backward. Hold each position for about 15 seconds. 3. Repeat 2 to 4 times. Toe curl    1. Sit on the floor, with the heel of your affected foot on the floor. 2. Gently curl your toes forward and then backward. Hold each position for about 6 seconds. 3. Repeat 8 to 12 times. Towel scrunches    1. Sit in a chair, and place your affected foot on a towel on the floor. 2. Scrunch the towel toward you with your toes. Then use your toes to push the towel back into place. 3. Repeat 8 to 12 times.

## 2020-02-03 ENCOUNTER — HOSPITAL ENCOUNTER (OUTPATIENT)
Dept: MAMMOGRAPHY | Age: 50
Discharge: HOME | End: 2020-02-03
Attending: INTERNAL MEDICINE
Payer: COMMERCIAL

## 2020-02-03 DIAGNOSIS — Z12.31 ENCOUNTER FOR SCREENING MAMMOGRAM FOR BREAST CANCER: ICD-10-CM

## 2020-02-03 PROCEDURE — 77063 BREAST TOMOSYNTHESIS BI: CPT

## 2021-01-19 ENCOUNTER — TRANSCRIBE ORDER (OUTPATIENT)
Dept: SCHEDULING | Age: 51
End: 2021-01-19

## 2021-01-19 DIAGNOSIS — Z12.31 SCREENING MAMMOGRAM FOR HIGH-RISK PATIENT: Primary | ICD-10-CM

## 2021-01-21 ENCOUNTER — TRANSCRIBE ORDER (OUTPATIENT)
Dept: SCHEDULING | Age: 51
End: 2021-01-21

## 2021-01-21 DIAGNOSIS — N63.20 MASS OF LEFT BREAST: Primary | ICD-10-CM

## 2021-02-09 ENCOUNTER — HOSPITAL ENCOUNTER (OUTPATIENT)
Dept: ULTRASOUND IMAGING | Age: 51
Discharge: HOME | End: 2021-02-09
Attending: INTERNAL MEDICINE
Payer: COMMERCIAL

## 2021-02-09 ENCOUNTER — HOSPITAL ENCOUNTER (OUTPATIENT)
Dept: MAMMOGRAPHY | Age: 51
Discharge: HOME | End: 2021-02-09
Attending: INTERNAL MEDICINE
Payer: COMMERCIAL

## 2021-02-09 DIAGNOSIS — N63.20 MASS OF LEFT BREAST: ICD-10-CM

## 2021-02-09 DIAGNOSIS — N63.0 BREAST MASS: ICD-10-CM

## 2021-02-09 PROCEDURE — 77062 BREAST TOMOSYNTHESIS BI: CPT

## 2021-02-09 PROCEDURE — 76642 ULTRASOUND BREAST LIMITED: CPT

## 2021-06-28 ENCOUNTER — TRANSCRIBE ORDER (OUTPATIENT)
Dept: SCHEDULING | Age: 51
End: 2021-06-28

## 2021-06-28 DIAGNOSIS — R06.00 DYSPNEA: Primary | ICD-10-CM

## 2021-07-09 ENCOUNTER — TRANSCRIBE ORDER (OUTPATIENT)
Dept: REGISTRATION | Age: 51
End: 2021-07-09

## 2021-07-09 ENCOUNTER — HOSPITAL ENCOUNTER (OUTPATIENT)
Dept: NON INVASIVE DIAGNOSTICS | Age: 51
Discharge: HOME | End: 2021-07-09
Attending: NURSE PRACTITIONER
Payer: COMMERCIAL

## 2021-07-09 ENCOUNTER — HOSPITAL ENCOUNTER (OUTPATIENT)
Dept: GENERAL RADIOLOGY | Age: 51
Discharge: HOME | End: 2021-07-09
Attending: NURSE PRACTITIONER
Payer: COMMERCIAL

## 2021-07-09 VITALS
WEIGHT: 179 LBS | DIASTOLIC BLOOD PRESSURE: 78 MMHG | HEIGHT: 65 IN | BODY MASS INDEX: 29.82 KG/M2 | SYSTOLIC BLOOD PRESSURE: 124 MMHG

## 2021-07-09 DIAGNOSIS — F17.210 CIGARETTE SMOKER: ICD-10-CM

## 2021-07-09 DIAGNOSIS — R06.09 DYSPNEA ON EXERTION: ICD-10-CM

## 2021-07-09 DIAGNOSIS — R06.00 DYSPNEA: ICD-10-CM

## 2021-07-09 DIAGNOSIS — F17.210 CIGARETTE SMOKER: Primary | ICD-10-CM

## 2021-07-09 LAB
ECHO AO ROOT DIAM: 2.61 CM
ECHO LA AREA 4C: 12.09 CM2
ECHO LA VOL 2C: 25.39 ML (ref 22–52)
ECHO LA VOL 4C: 21.6 ML (ref 22–52)
ECHO LA VOL BP: 28.74 ML (ref 22–52)
ECHO LA VOL/BSA BIPLANE: 15.21 ML/M2 (ref 16–28)
ECHO LA VOLUME INDEX A2C: 13.43 ML/M2 (ref 16–28)
ECHO LA VOLUME INDEX A4C: 11.43 ML/M2 (ref 16–28)
ECHO LV INTERNAL DIMENSION DIASTOLIC: 4.37 CM (ref 3.9–5.3)
ECHO LV INTERNAL DIMENSION SYSTOLIC: 2.86 CM
ECHO LV IVSD: 1.08 CM (ref 0.6–0.9)
ECHO LV MASS 2D: 165 G (ref 67–162)
ECHO LV MASS INDEX 2D: 87.3 G/M2 (ref 43–95)
ECHO LV POSTERIOR WALL DIASTOLIC: 1.1 CM (ref 0.6–0.9)
ECHO LVOT DIAM: 1.97 CM
ECHO LVOT PEAK GRADIENT: 4 MMHG
ECHO LVOT PEAK VELOCITY: 99.96 CM/S
ECHO LVOT SV: 58.6 ML
ECHO LVOT VTI: 19.21 CM
ECHO MV A VELOCITY: 64.62 CM/S
ECHO MV E DECELERATION TIME (DT): 281.62 MS
ECHO MV E VELOCITY: 50.65 CM/S
ECHO MV E/A RATIO: 0.78
LVOT MG: 1.82 MMHG

## 2021-07-09 PROCEDURE — 93306 TTE W/DOPPLER COMPLETE: CPT | Performed by: INTERNAL MEDICINE

## 2021-07-09 PROCEDURE — 71046 X-RAY EXAM CHEST 2 VIEWS: CPT

## 2021-07-09 PROCEDURE — 93306 TTE W/DOPPLER COMPLETE: CPT

## 2021-12-03 ENCOUNTER — TRANSCRIBE ORDER (OUTPATIENT)
Dept: SCHEDULING | Age: 51
End: 2021-12-03

## 2021-12-03 DIAGNOSIS — R07.89 OTHER CHEST PAIN: Primary | ICD-10-CM

## 2022-03-18 PROBLEM — M79.18 MYOFASCIAL PAIN: Status: ACTIVE | Noted: 2017-04-11

## 2022-03-18 PROBLEM — M77.12 LATERAL EPICONDYLITIS OF LEFT ELBOW: Status: ACTIVE | Noted: 2017-11-07

## 2022-03-19 PROBLEM — Z98.1 S/P CERVICAL SPINAL FUSION: Status: ACTIVE | Noted: 2017-03-07

## 2022-03-19 PROBLEM — M25.511 SHOULDER PAIN, RIGHT: Status: ACTIVE | Noted: 2017-08-31

## 2022-03-19 PROBLEM — G56.03 BILATERAL CARPAL TUNNEL SYNDROME: Status: ACTIVE | Noted: 2017-11-07

## 2022-03-20 PROBLEM — R20.0 NUMBNESS OF RIGHT HAND: Status: ACTIVE | Noted: 2017-08-31

## 2022-12-29 ENCOUNTER — TRANSCRIBE ORDER (OUTPATIENT)
Dept: SCHEDULING | Age: 52
End: 2022-12-29

## 2022-12-29 DIAGNOSIS — F17.210 NICOTINE DEPENDENCE, CIGARETTES, UNCOMPLICATED: Primary | ICD-10-CM

## 2023-01-09 ENCOUNTER — TRANSCRIBE ORDER (OUTPATIENT)
Dept: SCHEDULING | Age: 53
End: 2023-01-09

## 2023-01-09 DIAGNOSIS — F17.210 NICOTINE DEPENDENCE, CIGARETTES, UNCOMPLICATED: Primary | ICD-10-CM

## 2023-01-14 ENCOUNTER — HOSPITAL ENCOUNTER (OUTPATIENT)
Dept: CT IMAGING | Age: 53
End: 2023-01-14
Payer: COMMERCIAL

## 2023-01-14 VITALS — HEIGHT: 62 IN | WEIGHT: 191 LBS | BODY MASS INDEX: 35.15 KG/M2

## 2023-01-14 DIAGNOSIS — F17.210 NICOTINE DEPENDENCE, CIGARETTES, UNCOMPLICATED: ICD-10-CM

## 2023-01-14 PROCEDURE — 71271 CT THORAX LUNG CANCER SCR C-: CPT

## 2023-01-27 ENCOUNTER — TELEPHONE (OUTPATIENT)
Dept: SURGERY | Age: 53
End: 2023-01-27

## 2024-10-30 ENCOUNTER — OFFICE VISIT (OUTPATIENT)
Age: 54
End: 2024-10-30
Payer: COMMERCIAL

## 2024-10-30 VITALS
RESPIRATION RATE: 18 BRPM | OXYGEN SATURATION: 98 % | HEART RATE: 87 BPM | BODY MASS INDEX: 37.36 KG/M2 | WEIGHT: 203 LBS | HEIGHT: 62 IN

## 2024-10-30 DIAGNOSIS — M43.16 SPONDYLOLISTHESIS OF LUMBAR REGION: ICD-10-CM

## 2024-10-30 DIAGNOSIS — G89.29 CHRONIC BILATERAL LOW BACK PAIN WITH RIGHT-SIDED SCIATICA: Primary | ICD-10-CM

## 2024-10-30 DIAGNOSIS — M54.41 CHRONIC BILATERAL LOW BACK PAIN WITH RIGHT-SIDED SCIATICA: Primary | ICD-10-CM

## 2024-10-30 DIAGNOSIS — M51.362 DEGENERATION OF INTERVERTEBRAL DISC OF LUMBAR REGION WITH DISCOGENIC BACK PAIN AND LOWER EXTREMITY PAIN: ICD-10-CM

## 2024-10-30 DIAGNOSIS — M47.816 LUMBAR SPONDYLOSIS: ICD-10-CM

## 2024-10-30 PROCEDURE — 72100 X-RAY EXAM L-S SPINE 2/3 VWS: CPT | Performed by: NURSE PRACTITIONER

## 2024-10-30 PROCEDURE — 99204 OFFICE O/P NEW MOD 45 MIN: CPT | Performed by: NURSE PRACTITIONER

## 2024-10-30 RX ORDER — ARIPIPRAZOLE 10 MG/1
1 TABLET ORAL
COMMUNITY

## 2024-10-30 RX ORDER — ATORVASTATIN CALCIUM 10 MG/1
TABLET, FILM COATED ORAL
COMMUNITY

## 2024-10-30 RX ORDER — MELOXICAM 15 MG/1
15 TABLET ORAL DAILY
Qty: 30 TABLET | Refills: 1 | Status: SHIPPED | OUTPATIENT
Start: 2024-10-30

## 2024-10-30 RX ORDER — ARIPIPRAZOLE 2 MG/1
TABLET ORAL
COMMUNITY
Start: 2024-09-12

## 2024-10-30 RX ORDER — HYDROXYZINE HYDROCHLORIDE 25 MG/1
25 TABLET, FILM COATED ORAL 2 TIMES DAILY
COMMUNITY

## 2024-10-30 RX ORDER — METHYLPREDNISOLONE 4 MG/1
TABLET ORAL
Qty: 1 KIT | Refills: 0 | Status: SHIPPED | OUTPATIENT
Start: 2024-10-30 | End: 2024-11-05

## 2024-10-30 RX ORDER — TIOTROPIUM BROMIDE INHALATION SPRAY 3.12 UG/1
SPRAY, METERED RESPIRATORY (INHALATION)
COMMUNITY

## 2024-10-30 RX ORDER — ESCITALOPRAM OXALATE 20 MG/1
20 TABLET ORAL DAILY
COMMUNITY
Start: 2024-10-14

## 2024-10-30 RX ORDER — CARIPRAZINE 1.5 MG/1
1.5 CAPSULE, GELATIN COATED ORAL DAILY
COMMUNITY
Start: 2024-10-07

## 2024-10-30 NOTE — PROGRESS NOTES
Atul WALTER GlezRob presents today for   Chief Complaint   Patient presents with    Back Problem    Pain    Back Pain       Is someone accompanying this pt? no    Is the patient using any DME equipment during OV? no    Depression Screening:       No data to display                Learning Assessment:  Failed to redirect to the Timeline version of the [employer] SmartLink.    Abuse Screening:       No data to display                Fall Risk  Failed to redirect to the Timeline version of the [employer] SmartLink.    OPIOID RISK TOOL  Failed to redirect to the Timeline version of the [employer] SmartLink.    Coordination of Care:  1. Have you been to the ER, urgent care clinic since your last visit? no  Hospitalized since your last visit? no    2. Have you seen or consulted any other health care providers outside of the [facility] since your last visit? no Include any pap smears or colon screening. no

## 2024-10-30 NOTE — PROGRESS NOTES
Chief complaint   Chief Complaint   Patient presents with    Back Problem    Pain    Back Pain       History of Present Illness:  Atul Rob is a  53 y.o.  female who comes in today as a new patient.  She was previously seen her years ago for neck pain and ultimately underwent a ACDF C4-5 C5-6 C6-7 on February 27, 2017 by Dr. Dubose from which she did well.  She states today she is here for low back pain with right buttock pain that radiates mostly to the knee but can go down to the foot.  She gets tingling, burning stabbing and aching pain.  She states has been going on for 3 years and has become progressively worse and more constant.  She has tried ibuprofen and ice.  She denies fever bowel bladder dysfunction.  She works as a  and finds her back is really inhibiting her in her job.  She smokes three fourths a pack of cigarettes per day but is trying to quit and has gotten the patches and plans on using them soon.      Past Medical History:    Hyperlipidemia, depression and anxiety, COPD, B12 and vitamin D deficiencies    Past Surgical History:    ACDF C4-C7, right carpal tunnel release, right bunionectomy      Physical Exam: Patient is a 53-year-old female well-developed well-nourished who is alert and oriented with a normal mood and affect.  She has a full weightbearing antalgic gait.  She denies any assist device.  She has 4 out of 5 strength bilateral lower extremities.  Negative straight leg raise.  She has some pain with hyperextension lumbar spine      Assessment and Plan:.  This is a patient who has low back pain with right radicular pain that is progressively got worse over the last 3 years.  We did a lumbar AP and lateral x-ray.  This demonstrates lumbar spondylosis, degenerative disc disease and L5-S1 spondylolisthesis.  I will give her a home exercise program to do for her back and leg pain.  I have printed this out for her.  I will start her with a Medrol Dosepak

## 2024-12-13 ENCOUNTER — HOSPITAL ENCOUNTER (OUTPATIENT)
Facility: HOSPITAL | Age: 54
Discharge: HOME | End: 2024-12-16
Attending: INTERNAL MEDICINE
Payer: COMMERCIAL

## 2024-12-13 DIAGNOSIS — R19.00 INTRA-ABDOMINAL AND PELVIC SWELLING, MASS AND LUMP, UNSPECIFIED SITE: ICD-10-CM

## 2024-12-13 DIAGNOSIS — K46.9 ABDOMINAL HERNIA WITHOUT OBSTRUCTION AND WITHOUT GANGRENE, RECURRENCE NOT SPECIFIED, UNSPECIFIED HERNIA TYPE: ICD-10-CM

## 2024-12-13 PROCEDURE — 74177 CT ABD & PELVIS W/CONTRAST: CPT

## 2024-12-13 PROCEDURE — 2500000003 HC RX 250 WO HCPCS: Performed by: INTERNAL MEDICINE

## 2024-12-13 PROCEDURE — 6360000004 HC RX CONTRAST MEDICATION: Performed by: INTERNAL MEDICINE

## 2024-12-13 RX ORDER — IOPAMIDOL 612 MG/ML
100 INJECTION, SOLUTION INTRAVASCULAR
Status: COMPLETED | OUTPATIENT
Start: 2024-12-13 | End: 2024-12-13

## 2024-12-13 RX ADMIN — BARIUM SULFATE 900 ML: 20 SUSPENSION ORAL at 08:37

## 2024-12-13 RX ADMIN — IOPAMIDOL 100 ML: 612 INJECTION, SOLUTION INTRAVENOUS at 09:45

## 2024-12-24 ENCOUNTER — TRANSCRIBE ORDERS (OUTPATIENT)
Facility: HOSPITAL | Age: 54
End: 2024-12-24

## 2024-12-24 DIAGNOSIS — R10.12 LEFT UPPER QUADRANT PAIN: Primary | ICD-10-CM

## 2024-12-24 DIAGNOSIS — K40.90 UNILATERAL INGUINAL HERNIA WITHOUT OBSTRUCTION OR GANGRENE, RECURRENCE NOT SPECIFIED: ICD-10-CM

## 2025-01-02 ENCOUNTER — HOSPITAL ENCOUNTER (OUTPATIENT)
Facility: HOSPITAL | Age: 55
Discharge: HOME | End: 2025-01-02
Attending: INTERNAL MEDICINE
Payer: COMMERCIAL

## 2025-01-02 DIAGNOSIS — K40.90 UNILATERAL INGUINAL HERNIA WITHOUT OBSTRUCTION OR GANGRENE, RECURRENCE NOT SPECIFIED: ICD-10-CM

## 2025-01-02 DIAGNOSIS — R10.12 LEFT UPPER QUADRANT PAIN: ICD-10-CM

## 2025-01-02 PROCEDURE — 76882 US LMTD JT/FCL EVL NVASC XTR: CPT

## 2025-01-02 PROCEDURE — 76705 ECHO EXAM OF ABDOMEN: CPT

## 2025-01-09 DIAGNOSIS — M47.816 LUMBAR SPONDYLOSIS: ICD-10-CM

## 2025-01-09 DIAGNOSIS — M51.362 DEGENERATION OF INTERVERTEBRAL DISC OF LUMBAR REGION WITH DISCOGENIC BACK PAIN AND LOWER EXTREMITY PAIN: ICD-10-CM

## 2025-01-09 DIAGNOSIS — G89.29 CHRONIC BILATERAL LOW BACK PAIN WITH RIGHT-SIDED SCIATICA: ICD-10-CM

## 2025-01-09 DIAGNOSIS — M54.41 CHRONIC BILATERAL LOW BACK PAIN WITH RIGHT-SIDED SCIATICA: ICD-10-CM

## 2025-01-09 DIAGNOSIS — M43.16 SPONDYLOLISTHESIS OF LUMBAR REGION: ICD-10-CM

## 2025-01-10 RX ORDER — MELOXICAM 15 MG/1
15 TABLET ORAL DAILY
Qty: 30 TABLET | Refills: 1 | OUTPATIENT
Start: 2025-01-10

## 2025-01-14 ENCOUNTER — OFFICE VISIT (OUTPATIENT)
Age: 55
End: 2025-01-14
Payer: COMMERCIAL

## 2025-01-14 VITALS
SYSTOLIC BLOOD PRESSURE: 111 MMHG | DIASTOLIC BLOOD PRESSURE: 67 MMHG | TEMPERATURE: 98.2 F | HEIGHT: 62 IN | BODY MASS INDEX: 37.87 KG/M2 | WEIGHT: 205.8 LBS | OXYGEN SATURATION: 95 % | RESPIRATION RATE: 18 BRPM | HEART RATE: 84 BPM

## 2025-01-14 DIAGNOSIS — M51.362 DEGENERATION OF INTERVERTEBRAL DISC OF LUMBAR REGION WITH DISCOGENIC BACK PAIN AND LOWER EXTREMITY PAIN: ICD-10-CM

## 2025-01-14 DIAGNOSIS — G89.29 CHRONIC BILATERAL LOW BACK PAIN WITH RIGHT-SIDED SCIATICA: Primary | ICD-10-CM

## 2025-01-14 DIAGNOSIS — M54.41 CHRONIC BILATERAL LOW BACK PAIN WITH RIGHT-SIDED SCIATICA: Primary | ICD-10-CM

## 2025-01-14 DIAGNOSIS — M43.16 SPONDYLOLISTHESIS OF LUMBAR REGION: ICD-10-CM

## 2025-01-14 DIAGNOSIS — M47.816 LUMBAR SPONDYLOSIS: ICD-10-CM

## 2025-01-14 DIAGNOSIS — F41.9 ANXIETY: ICD-10-CM

## 2025-01-14 PROCEDURE — 99214 OFFICE O/P EST MOD 30 MIN: CPT | Performed by: NURSE PRACTITIONER

## 2025-01-14 RX ORDER — DIAZEPAM 10 MG/1
TABLET ORAL
Qty: 1 TABLET | Refills: 0 | Status: SHIPPED | OUTPATIENT
Start: 2025-01-14 | End: 2025-02-13

## 2025-01-14 RX ORDER — ALBUTEROL SULFATE 90 UG/1
INHALANT RESPIRATORY (INHALATION)
COMMUNITY
Start: 2025-01-04

## 2025-01-14 RX ORDER — ZOLPIDEM TARTRATE 5 MG/1
TABLET ORAL
COMMUNITY

## 2025-01-14 RX ORDER — PHENTERMINE HYDROCHLORIDE 37.5 MG/1
37.5 TABLET ORAL
COMMUNITY
Start: 2025-01-04

## 2025-01-14 NOTE — PROGRESS NOTES
Atul WALTER GlezRob presents today for   Chief Complaint   Patient presents with    Back Problem    Pain    Back Pain       Is someone accompanying this pt? NO    Is the patient using any DME equipment during OV? NO    Depression Screening:       No data to display                Learning Assessment:  Failed to redirect to the Timeline version of the [employer] SmartLink.    Abuse Screening:       No data to display                Fall Risk  Failed to redirect to the Timeline version of the [employer] SmartLink.    OPIOID RISK TOOL  Failed to redirect to the Timeline version of the [employer] SmartLink.    Coordination of Care:  1. Have you been to the ER, urgent care clinic since your last visit? NO  Hospitalized since your last visit? NO    2. Have you seen or consulted any other health care providers outside of the [facility] since your last visit? NO Include any pap smears or colon screening. NO

## 2025-01-14 NOTE — PROGRESS NOTES
Chief complaint   Chief Complaint   Patient presents with    Back Problem    Pain    Back Pain       History of Present Illness:  Atul Rob is a  54 y.o.  female  who comes in today for follow-up of her low back pain that radiates to her right buttock down to her knee and occasionally down to the foot.  She gets tingling, burning stabbing and aching pain.  It has become progressively worse over about 3 years.  It last visit I gave her a home exercise program to do for her back and leg pain.  She states she did do them and that it helped a little bit.  We also gave her Medrol Dosepak which helped while she was on it and then followed that up with Mobic 15 mg daily which she said states takes the edge off the pain but she still has significant pain.   We did a lumbar AP and lateral x-ray.  This demonstrates lumbar spondylosis, degenerative disc disease and L5-S1 spondylolisthesis. She works as a  and finds her back is really inhibiting her in her job.  She smokes three fourths a pack of cigarettes per day.  She was previously seen years ago for neck pain and ultimately underwent a ACDF C4-5 C5-6 C6-7 on February 27, 2017 by Dr. Duboes from which she did well.  She denies fever bowel bladder dysfunction.         Physical Exam: Patient is a 54-year-old female well-developed well-nourished who is alert and oriented with a normal mood and affect.  She has a full weightbearing antalgic gait.  She denies any assist device.  She has 4 out of 5 strength bilateral lower extremities.  Negative straight leg raise.  She has some pain with hyperextension lumbar spine        Assessment and Plan:.  This is a patient who has low back pain with right radicular pain that is progressively got worse over the last 3 years.  She has done the home exercise program I gave her which only helped a little bit.  Her meloxicam takes the edge off but she still has significant pain.  I will get a MRI of her

## 2025-01-22 ENCOUNTER — HOSPITAL ENCOUNTER (OUTPATIENT)
Facility: HOSPITAL | Age: 55
Discharge: HOME | End: 2025-01-25
Payer: COMMERCIAL

## 2025-01-22 DIAGNOSIS — G89.29 CHRONIC BILATERAL LOW BACK PAIN WITH RIGHT-SIDED SCIATICA: ICD-10-CM

## 2025-01-22 DIAGNOSIS — M54.41 CHRONIC BILATERAL LOW BACK PAIN WITH RIGHT-SIDED SCIATICA: ICD-10-CM

## 2025-01-22 DIAGNOSIS — M47.816 LUMBAR SPONDYLOSIS: ICD-10-CM

## 2025-01-22 DIAGNOSIS — M43.16 SPONDYLOLISTHESIS OF LUMBAR REGION: ICD-10-CM

## 2025-01-22 DIAGNOSIS — M51.362 DEGENERATION OF INTERVERTEBRAL DISC OF LUMBAR REGION WITH DISCOGENIC BACK PAIN AND LOWER EXTREMITY PAIN: ICD-10-CM

## 2025-01-22 PROCEDURE — 72148 MRI LUMBAR SPINE W/O DYE: CPT

## 2025-03-04 ENCOUNTER — OFFICE VISIT (OUTPATIENT)
Age: 55
End: 2025-03-04
Payer: COMMERCIAL

## 2025-03-04 VITALS
HEIGHT: 62 IN | OXYGEN SATURATION: 93 % | DIASTOLIC BLOOD PRESSURE: 70 MMHG | WEIGHT: 206 LBS | BODY MASS INDEX: 37.91 KG/M2 | TEMPERATURE: 97.4 F | HEART RATE: 93 BPM | SYSTOLIC BLOOD PRESSURE: 117 MMHG

## 2025-03-04 DIAGNOSIS — M43.10 PARS DEFECT WITH SPONDYLOLISTHESIS: Primary | ICD-10-CM

## 2025-03-04 DIAGNOSIS — M54.31 RIGHT SIDED SCIATICA: ICD-10-CM

## 2025-03-04 PROBLEM — M43.16 SPONDYLOLISTHESIS OF LUMBAR REGION: Status: ACTIVE | Noted: 2025-03-04

## 2025-03-04 PROCEDURE — 99214 OFFICE O/P EST MOD 30 MIN: CPT | Performed by: PHYSICAL MEDICINE & REHABILITATION

## 2025-03-04 RX ORDER — PREGABALIN 75 MG/1
CAPSULE ORAL
Qty: 60 CAPSULE | Refills: 2 | Status: SHIPPED | OUTPATIENT
Start: 2025-03-04 | End: 2025-06-02

## 2025-03-04 RX ORDER — HYDROXYZINE HYDROCHLORIDE 50 MG/1
50 TABLET, FILM COATED ORAL NIGHTLY PRN
COMMUNITY
Start: 2025-02-15

## 2025-03-04 NOTE — PROGRESS NOTES
Atul Rob presents today for   Chief Complaint   Patient presents with    Lower Back Pain       Is someone accompanying this pt? no    Is the patient using any DME equipment during OV? no    Coordination of Care:  1. Have you been to the ER, urgent care clinic since your last visit? no  Hospitalized since your last visit? no    2. Have you seen or consulted any other health care providers outside of the [facility] since your last visit? no Include any pap smears or colon screening. no        
(seasonal allergies)       No current facility-administered medications for this visit.         By signing my name below, I, JOSEPH Faith, attest that this documentation has been prepared under the direction and in the presence of ASHLEY ARMSTRONG MD  Electronically signed: JOSEPH Faith. 3/4/25 3:40 PM EST     I, Ashley Armstrong MD, personally performed the services described in this documentation. I have authorized the scribe to complete the medical record entries input within this chart. I have reviewed the chart and agree that the record reflects my personal performance and is accurate and complete. [Electronically Signed: Ashley Armstrong MD. 3/4/2025 5:19 PM ]    Portions of this note was created using Dragon transcription software and may contain unintended errors.

## 2025-03-04 NOTE — PATIENT INSTRUCTIONS
Back Care and Preventing Injuries: Care Instructions  Overview     You can hurt your back doing many everyday activities: lifting a heavy box, bending down to garden, exercising at the gym, and even getting out of bed. But you can keep your back strong and healthy by doing some exercises. You also can follow a few tips for sitting, sleeping, and lifting to avoid hurting your back again.  Talk to your doctor before you start an exercise program. Ask for help if you want to learn more about keeping your back healthy.  Follow-up care is a key part of your treatment and safety. Be sure to make and go to all appointments, and call your doctor if you are having problems. It's also a good idea to know your test results and keep a list of the medicines you take.  How can you care for yourself at home?  Stay at a healthy weight to avoid strain on your lower back.  Do not smoke. Smoking increases the risk of osteoporosis, which weakens the spine. If you need help quitting, talk to your doctor about stop-smoking programs and medicines. These can increase your chances of quitting for good.  Make sure you sleep in a position that maintains your back's normal curves and on a mattress that feels comfortable. Sleep on your side with a pillow between your knees, or sleep on your back with a pillow under your knees. These positions can reduce strain on your back.  When you get out of bed, lie on your side and bend both knees. Drop your feet over the edge of the bed as you push up with both arms. Scoot to the edge of the bed. Make sure your feet are in line with your rear end (buttocks), and then stand up.  If you must stand for a long time, put one foot on a stool, ledge, or box.  Exercise to strengthen your back and other muscles  Get at least 30 minutes of exercise on most days of the week. Walking is a good choice. You also may want to do other activities, such as running, swimming, cycling, or playing tennis or team

## 2025-07-16 ENCOUNTER — OFFICE VISIT (OUTPATIENT)
Age: 55
End: 2025-07-16
Payer: COMMERCIAL

## 2025-07-16 VITALS
TEMPERATURE: 98 F | BODY MASS INDEX: 37.91 KG/M2 | HEIGHT: 62 IN | WEIGHT: 206 LBS | OXYGEN SATURATION: 93 % | HEART RATE: 87 BPM

## 2025-07-16 DIAGNOSIS — M51.362 DEGENERATION OF INTERVERTEBRAL DISC OF LUMBAR REGION WITH DISCOGENIC BACK PAIN AND LOWER EXTREMITY PAIN: ICD-10-CM

## 2025-07-16 DIAGNOSIS — M47.816 LUMBAR SPONDYLOSIS: ICD-10-CM

## 2025-07-16 DIAGNOSIS — M43.16 SPONDYLOLISTHESIS OF LUMBAR REGION: ICD-10-CM

## 2025-07-16 DIAGNOSIS — G89.29 CHRONIC BILATERAL LOW BACK PAIN WITH RIGHT-SIDED SCIATICA: Primary | ICD-10-CM

## 2025-07-16 DIAGNOSIS — M54.41 CHRONIC BILATERAL LOW BACK PAIN WITH RIGHT-SIDED SCIATICA: Primary | ICD-10-CM

## 2025-07-16 PROCEDURE — 99213 OFFICE O/P EST LOW 20 MIN: CPT | Performed by: NURSE PRACTITIONER

## 2025-07-16 ASSESSMENT — PATIENT HEALTH QUESTIONNAIRE - PHQ9
2. FEELING DOWN, DEPRESSED OR HOPELESS: NOT AT ALL
SUM OF ALL RESPONSES TO PHQ QUESTIONS 1-9: 0
1. LITTLE INTEREST OR PLEASURE IN DOING THINGS: NOT AT ALL
SUM OF ALL RESPONSES TO PHQ QUESTIONS 1-9: 0

## 2025-07-16 NOTE — PROGRESS NOTES
Chief complaint   Chief Complaint   Patient presents with    Back Pain       History of Present Illness:  Atul Rob is a  54 y.o.  female who comes in today for follow-up of her low back pain that radiates to her right buttock and down to her knee.  She does have known degenerative disc disease and facet arthropathy along with severe right foraminal stenosis that compresses the right L5 nerve root.  Last visit Dr. Armstrong started her on Lyrica 75 mg twice a day.  She states she took it for 8 days and it really did not help and it made her feel out of it so she stopped it.  She does not really feel like she needs anything.  She only takes meloxicam 15 mg as needed now.  She states she does get flares of pain from time to time and she had one last week when she was helping her daughter move but she took some ibuprofen and that calmed down on its own.  She works as a  at [employer] and can get flares of pain when working.  She has work restrictions of avoid lifting greater than 20 pounds, avoid repetitive bending lifting and twisting.  She hopes to retire in 1 to 2 years.  She smokes 1 pack of cigarettes per day.  She denies fever bowel bladder dysfunction.      Physical Exam: The patient is a 54-year-old female well-developed well-nourished who is alert and oriented with a normal mood and affect.  She has a full weightbearing on touch gait.  She has 4 out of 5 strength the lower extremities.  Negative straight leg raise.  She does have some pain with hyperextension of her spine.      Assessment and Plan: This is a patient who has some back pain with right lower extremity pain down to the knee.  She does have degenerative disc disease, facet arthropathy and foraminal stenosis and a spondylolisthesis.  She does not feel like she needs any medications for her radicular pain at this time.  She does try to do a home exercise program and will continue that.  I did discuss with her that

## (undated) DEVICE — INSULATED BLADE ELECTRODE: Brand: EDGE

## (undated) DEVICE — Device

## (undated) DEVICE — DERMABOND SKIN ADH 0.7ML -- DERMABOND ADVANCED 12/BX

## (undated) DEVICE — KIT CLN UP BON SECOURS MARYV

## (undated) DEVICE — (D)DRSG BORD MPLX SCRM 18X18CM -- DISC BY MFR USE ITEM 346511

## (undated) DEVICE — APPLIER CLP AUTO MED 9.75 IN TI SURGCLP SUPER INTLOK 20 DISP

## (undated) DEVICE — SPONGE DISSECT PNUT SM 3/8IN -- 5/PK

## (undated) DEVICE — REM POLYHESIVE ADULT PATIENT RETURN ELECTRODE: Brand: VALLEYLAB

## (undated) DEVICE — KENDALL SCD EXPRESS SLEEVES, KNEE LENGTH, MEDIUM: Brand: KENDALL SCD

## (undated) DEVICE — STOCKING COMPR M REG 22MMHG WHT 86% NYL 13%SPANDEX E TOP

## (undated) DEVICE — SOLUTION IV 1000ML 0.9% SOD CHL

## (undated) DEVICE — BIT DRL DIA2MM STP L14MM QUIK CPL REUSE

## (undated) DEVICE — TELFA NON-ADHERENT PADS PREPAK: Brand: TELFA

## (undated) DEVICE — SYRINGE MED 3ML NDL 22GA L1 1/2IN REG BVL SFGLDE

## (undated) DEVICE — GOWN,REINFORCED,POLY,AURORA,XLARGE,STRL: Brand: MEDLINE

## (undated) DEVICE — STERILE LATEX POWDER-FREE SURGICAL GLOVESWITH NITRILE COATING: Brand: PROTEXIS

## (undated) DEVICE — INTENDED FOR TISSUE SEPARATION, AND OTHER PROCEDURES THAT REQUIRE A SHARP SURGICAL BLADE TO PUNCTURE OR CUT.: Brand: BARD-PARKER SAFETY BLADES SIZE 10, STERILE

## (undated) DEVICE — 3M™ TEGADERM™ TRANSPARENT FILM DRESSING FRAME STYLE, 1626W, 4 IN X 4-3/4 IN (10 CM X 12 CM), 50/CT 4CT/CASE: Brand: 3M™ TEGADERM™

## (undated) DEVICE — GAUZE SPONGES,USP TYPE VII GAUZE, 12 PLY: Brand: CURITY

## (undated) DEVICE — DRAIN SURG W7MMXL20CM SIL FULL PERF HUBLESS FLAT RADPQ STRP

## (undated) DEVICE — PACKING 8004000 NEURAY 200PK 13X13MM: Brand: NEURAY ®

## (undated) DEVICE — SUTURE VCRL SZ 3-0 L27IN ABSRB UD L26MM SH 1/2 CIR J416H

## (undated) DEVICE — 3.0MM PRECISION NEURO (MATCH HEAD)

## (undated) DEVICE — SCREW EXT FIX L14MM FOR DISTRCTN

## (undated) DEVICE — FLEX ADVANTAGE 3000CC: Brand: FLEX ADVANTAGE

## (undated) DEVICE — PREP SKN PREVAIL 40ML APPL --

## (undated) DEVICE — SUTURE MCRYL SZ 4-0 L18IN ABSRB UD L19MM PS-2 3/8 CIR PRIM Y496G

## (undated) DEVICE — 10FR FRAZIER SUCTION HANDLE: Brand: CARDINAL HEALTH

## (undated) DEVICE — 3M™ BAIR PAWS FLEX™ WARMING GOWN, STANDARD, 20 PER CASE 81003: Brand: BAIR PAWS™